# Patient Record
Sex: FEMALE | Race: WHITE
[De-identification: names, ages, dates, MRNs, and addresses within clinical notes are randomized per-mention and may not be internally consistent; named-entity substitution may affect disease eponyms.]

---

## 2017-05-10 ENCOUNTER — HOSPITAL ENCOUNTER (OUTPATIENT)
Dept: HOSPITAL 63 - US | Age: 82
Discharge: HOME | End: 2017-05-10
Attending: FAMILY MEDICINE
Payer: MEDICARE

## 2017-05-10 DIAGNOSIS — L03.129: ICD-10-CM

## 2017-05-10 DIAGNOSIS — I73.9: Primary | ICD-10-CM

## 2017-05-10 PROCEDURE — 93923 UPR/LXTR ART STDY 3+ LVLS: CPT

## 2017-05-10 NOTE — RAD
Indication: Diabetes with left leg wound.



Grayscale, color-flow and duplex Doppler evaluation of the left lower

extremity arterial system was performed.



Mild calcified atherosclerotic plaque is identified throughout the left lower

extremity arterial system. There are primarily biphasic waveforms throughout.

Velocities are unremarkable. No significant velocity elevation or stenosis is

seen. No occlusion is identified.



Impression: Atherosclerotic changes. No high-grade stenosis or occlusion is

identified.

## 2021-07-14 ENCOUNTER — HOSPITAL ENCOUNTER (EMERGENCY)
Dept: HOSPITAL 63 - ER | Age: 86
LOS: 1 days | Discharge: HOME | End: 2021-07-15
Payer: MEDICARE

## 2021-07-14 VITALS — WEIGHT: 144.4 LBS | HEIGHT: 63.5 IN | BODY MASS INDEX: 25.27 KG/M2

## 2021-07-14 DIAGNOSIS — M47.812: ICD-10-CM

## 2021-07-14 DIAGNOSIS — D72.829: ICD-10-CM

## 2021-07-14 DIAGNOSIS — E11.9: ICD-10-CM

## 2021-07-14 DIAGNOSIS — M25.511: Primary | ICD-10-CM

## 2021-07-14 DIAGNOSIS — Z88.0: ICD-10-CM

## 2021-07-14 PROCEDURE — 70450 CT HEAD/BRAIN W/O DYE: CPT

## 2021-07-14 PROCEDURE — 83735 ASSAY OF MAGNESIUM: CPT

## 2021-07-14 PROCEDURE — 73030 X-RAY EXAM OF SHOULDER: CPT

## 2021-07-14 PROCEDURE — 83880 ASSAY OF NATRIURETIC PEPTIDE: CPT

## 2021-07-14 PROCEDURE — 36415 COLL VENOUS BLD VENIPUNCTURE: CPT

## 2021-07-14 PROCEDURE — 71045 X-RAY EXAM CHEST 1 VIEW: CPT

## 2021-07-14 PROCEDURE — 93005 ELECTROCARDIOGRAM TRACING: CPT

## 2021-07-14 PROCEDURE — 82550 ASSAY OF CK (CPK): CPT

## 2021-07-14 PROCEDURE — 71250 CT THORAX DX C-: CPT

## 2021-07-14 PROCEDURE — 81001 URINALYSIS AUTO W/SCOPE: CPT

## 2021-07-14 PROCEDURE — 85025 COMPLETE CBC W/AUTO DIFF WBC: CPT

## 2021-07-14 PROCEDURE — 85007 BL SMEAR W/DIFF WBC COUNT: CPT

## 2021-07-14 PROCEDURE — 80076 HEPATIC FUNCTION PANEL: CPT

## 2021-07-14 PROCEDURE — 99285 EMERGENCY DEPT VISIT HI MDM: CPT

## 2021-07-14 PROCEDURE — 84484 ASSAY OF TROPONIN QUANT: CPT

## 2021-07-14 PROCEDURE — 72125 CT NECK SPINE W/O DYE: CPT

## 2021-07-14 PROCEDURE — 96360 HYDRATION IV INFUSION INIT: CPT

## 2021-07-14 PROCEDURE — 80048 BASIC METABOLIC PNL TOTAL CA: CPT

## 2021-07-14 NOTE — EKG
[facility] [address], [place] [address]

Test Date:    2021               Test Time:    23:03:23

Pat Name:     CRUZ VIVAS          Department:   

Patient ID:   [id number]           Room:          

Gender:       F                        Technician:   YUNI

:          1935               Requested By: MISTY ALLEN

Order Number: [id number]            Reading MD:     

                                 Measurements

Intervals                              Axis          

Rate:         97                       P:            -63

WI:           132                      QRS:          -46

QRSD:         78                       T:            13

QT:           348                                    

QTc:          446                                    

                           Interpretive Statements

SINUS RHYTHM

ATRIAL PREMATURE COMPLEX(ES)

ABNORMAL LEFT AXIS DEVIATION

QRS(T) CONTOUR ABNORMALITY

CONSISTENT WITH ANTEROSEPTAL INFARCT

PROBABLY OLD

CONSISTENT WITH INFERIOR INFARCT

PROBABLY OLD

ABNORMAL ECG

RI6.02

No previous ECG available for comparison

## 2021-07-14 NOTE — PHYS DOC
Past History


Past Medical History:  Arthritis





General Adult


HPI:


HPI:


".. This shoulder.. has just started hurting... ..it really been hurting 

tonight...  it hurts to move it.. and can't find a comfortable position... "





Patient is a 85 year old female who presents with right shoulder pain.  Patient 

is having pain with range of motion.  There is some trapezius spasms associated 

with pain.  Patient rates pain as 8 out of 10 and constant.  Patient denies any 

trauma.  No recent travel.  No specific ill contacts.  No history 

immunosuppression.   Patient is right hand dominant.  Pulses in right wrist are 

equal to left wrist.  Any movement of the shoulder joint causes increased pain. 

Patient denies any fever or chills.  No history of immune  suppression.does have

a history of asthma and bronchitis.  Patient never smoked.  Patient has 

completed Covid Moderna Vaccination x 2 times in March.  Patient normally 

follows with Dr. Nath.





Review of Systems:


Review of Systems:


Constitutional:  Denies fever or chills 


Eyes:  Denies change in visual acuity 


HENT:  Denies nasal congestion or sore throat 


Respiratory: History of wheezing


Cardiovascular:  Denies chest pain or edema 


GI:  Denies abdominal pain, nausea, vomiting, bloody stools or diarrhea 


: Denies dysuria 


Musculoskeletal: Complains of right shoulder pain and neck pain


Integument:  Denies rash 


Neurologic:  Denies headache, focal weakness or sensory changes 


Endocrine:  Denies polyuria or polydipsia 


Lymphatic:  Denies swollen glands 


Psychiatric:  Denies depression or anxiety





Family History:


Family History:


Noncontributory to presentation





Current Medications:


Current Meds:


See nursing for home meds





Allergies:


Allergies:


Allergic to penicillin ibuprofen





Physical Exam:


PE:





Constitutional: Moderate acute distress, non-toxic appearance. []


HENT: Normocephalic, atraumatic, bilateral external ears normal, oropharynx 

moist, no oral exudates, nose normal. []


Eyes: PERRLA, EOMI, conjunctiva normal, no discharge. [] 


Neck: Normal range of motion, right trapezius tenderness, supple, no stridor. []

 Kyphosis and scoliosis


Cardiovascular: Tachycardia heart rate regular rhythm, no murmur [] PMI to the l

eft


Lungs & Thorax:  Bilateral breath sounds equal apex with basilar crackles on 

auscultation []


Abdomen: Bowel sounds normal, soft, no tenderness, no masses, no pulsatile 

masses.  Old scar


Skin: Warm, dry, no erythema, no rash.  Poor turgor


Back: No tenderness, no CVA tenderness. [] 


Extremities: No tenderness, no cyanosis, no clubbing, ROM intact, no edema.  

Except findings in right shoulder as per HPI


Neurologic: Alert and oriented X 3, moves extremities on request, does have 

distal sensory,, no focal deficits noted. []


Psychologic: Affect anxious, judgement normal, mood normal. []





EKG:


EKG:


My interpretation of EKG shows a sinus rhythm at 97 bpm.  Does have occasional 

PACs and PVCs.  Left axis deviation and contour abnormality in anterior septal 

region.  No findings of acute STEMI with contralateral changes.  But this is a 

abnormal EKG.  []





Radiology/Procedures:


Radiology/Procedures:


[][facility] [address], [place] [address]


                                 [phone]


                                        


                                 IMAGING REPORT





                                     Signed





PATIENT: CRUZ VIVAS AACCOUNT: [id number]     MRN#: [id number]


: 1935           LOCATION: ER              AGE: 85


SEX: F                    EXAM DT: 21         ACCESSION#: [id number]


STATUS: PRE ER            ORD. PHYSICIAN: MISTY ALLEN MD


REASON: pain


PROCEDURE: CT HEAD AND CERVICAL SPINE WO





INDICATION: Head and neck pain





COMPARISON: None.





TECHNIQUE:





Axial CT images obtained through the head and cervical spine





.





One or more of the following individualized dose reduction techniques were 

utilized for this examination:  1. Automated exposure control;  2. Adjustment of

 the mA and/or kV according to patient size;  3. Use of iterative reconstruction

 technique.





FINDINGS:





Head:





Calcific atherosclerosis. Distal left internal carotid artery is mildly 

prominent in size measuring up to about 6-7 mm.


No midline shift.


Multifocal regions of low density throughout the white matter.


Prominence of ventricles and sulci which can be seen with age-related volume 

loss.


No acute intracranial hemorrhage.


There is some fluid in the maxillary sinuses.








Cervical spine:





Degenerative changes throughout the cervical spine with disc protrusions and 

osteophyte formation as well as uncovertebral and facet hypertrophy with 

multilevel central canal and neural foraminal stenosis.


Osseous demineralization.


Pannus formation at the dens.


Calcific atherosclerosis.


Thyroid nodules.





IMPRESSION:





*  No acute intracranial hemorrhage.





*  Low-density throughout the white matter. Nonspecific but can be from small 

vessel ischemic disease.





*  Degenerative changes of the cervical spine with multilevel central canal and 

neural foraminal stenosis. No definite fracture seen.





Electronically signed by: Brandon Garcia MD (7/15/2021 12:42 AM) DESKTOP-

[id number]














DICTATED AND SIGNED BY:     BRANDON GARCIA MD


DATE:     07/15/21 0034





CC: MIGUEL NATH MD; MISTY ALLEN MD ~MTH0 0





Heart Score:


C/O Chest Pain:  N/A


Risk Factors:


Risk Factors:  DM, Current or recent (<one month) smoker, HTN, HLP, family 

history of CAD, obesity.


Risk Scores:


Score 0 - 3:  2.5% MACE over next 6 weeks - Discharge Home


Score 4 - 6:  20.3% MACE over next 6 weeks - Admit for Clinical Observation


Score 7 - 10:  72.7% MACE over next 6 weeks - Early Invasive Strategies





Course & Med Decision Making:


Course & Med Decision Making


Pertinent Labs and Imaging studies reviewed. (See chart for details)





Patient follow-up with Dr. Nath.  Patient to take Zithromax 250 a day.  

Trial course of lidocaine patches over right shoulder.  Take Tylenol for pain.  

For marked pain may take Percocet up to 4 times a day.  Return if any concerns.








Impression:





1. Rt Shoulder Pain


2. DJD and Cervical Neuropathy


3. Leukocytosis  15.9


4. Thrombcytopenia 123


5.  Elevated Bun/Creat   33/1.3


6.  DM - Glucosed 347


7.  Rt. Basilar Opacity- and Linear opacities- Pneumonia?











[]





Dragon Disclaimer:


Dragon Disclaimer:


This electronic medical record was generated, in whole or in part, using a voice

 recognition dictation system.





Departure


Departure:


Referrals:  


MIGUEL NATH MD (PCP)


Scripts


Azithromycin (ZITHROMAX) 250 Mg Tablet


250 MG PO DAILY for ANTI-BIOTIC for 5 Days, #5 TAB 0 Refills


   Prov: MISTY ALLEN MD         7/15/21 


Oxycodone Hcl/Acetaminophen (PERCOCET 5-325 MG TABLET  **) 1 Each Tablet


1 TAB PO PRN QID PRN for PAIN MDD 4 Tablet(s) for 30 Days, #30 TAB 0 Refills


   Prov: MISTY ALLEN MD         7/15/21 


Lidocaine (Lidocaine PATCH **) 1 Each Adh..patch


1 EACH TP DAILY for FOR LOCAL PAIN, #10 PATCH


   REMOVE AFTER 12 HOURS


   Prov: MISTY ALLEN MD         7/15/21











MISTY ALLEN MD           2021 23:02

## 2021-07-15 VITALS — SYSTOLIC BLOOD PRESSURE: 138 MMHG | DIASTOLIC BLOOD PRESSURE: 62 MMHG

## 2021-07-15 LAB
% LYMPHS: 14 % (ref 24–48)
% MONOS: 8 % (ref 0–10)
% SEGS: 78 % (ref 35–66)
ALBUMIN SERPL-MCNC: 3 G/DL (ref 3.4–5)
ALP SERPL-CCNC: 82 U/L (ref 46–116)
ALT SERPL-CCNC: 16 U/L (ref 14–59)
ANION GAP SERPL CALC-SCNC: 9 MMOL/L (ref 6–14)
APTT PPP: YELLOW S
AST SERPL-CCNC: 10 U/L (ref 15–37)
BACTERIA #/AREA URNS HPF: 0 /HPF
BASOPHILS # BLD AUTO: 0.1 X10^3/UL (ref 0–0.2)
BASOPHILS NFR BLD: 1 % (ref 0–3)
BILIRUB DIRECT SERPL-MCNC: 0.2 MG/DL (ref 0–0.2)
BILIRUB SERPL-MCNC: 0.7 MG/DL (ref 0.2–1)
BILIRUB UR QL STRIP: (no result)
CA-I SERPL ISE-MCNC: 33 MG/DL (ref 7–20)
CALCIUM SERPL-MCNC: 8.5 MG/DL (ref 8.5–10.1)
CHLORIDE SERPL-SCNC: 97 MMOL/L (ref 98–107)
CO2 SERPL-SCNC: 26 MMOL/L (ref 21–32)
CREAT SERPL-MCNC: 1.7 MG/DL (ref 0.6–1)
EOSINOPHIL NFR BLD: 0 % (ref 0–3)
EOSINOPHIL NFR BLD: 0 X10^3/UL (ref 0–0.7)
ERYTHROCYTE [DISTWIDTH] IN BLOOD BY AUTOMATED COUNT: 14.1 % (ref 11.5–14.5)
FIBRINOGEN PPP-MCNC: CLEAR MG/DL
GFR SERPLBLD BASED ON 1.73 SQ M-ARVRAT: 28.6 ML/MIN
GLUCOSE SERPL-MCNC: 347 MG/DL (ref 70–99)
GLUCOSE UR STRIP-MCNC: 500 MG/DL
HCT VFR BLD CALC: 39.6 % (ref 36–47)
HGB BLD-MCNC: 13.1 G/DL (ref 12–15.5)
LYMPHOCYTES # BLD: 1.3 X10^3/UL (ref 1–4.8)
LYMPHOCYTES NFR BLD AUTO: 8 % (ref 24–48)
MAGNESIUM SERPL-MCNC: 2.2 MG/DL (ref 1.8–2.4)
MCH RBC QN AUTO: 29 PG (ref 25–35)
MCHC RBC AUTO-ENTMCNC: 33 G/DL (ref 31–37)
MCV RBC AUTO: 87 FL (ref 79–100)
MONO #: 1.9 X10^3/UL (ref 0–1.1)
MONOCYTES NFR BLD: 12 % (ref 0–9)
NEUT #: 12.7 X10^3UL (ref 1.8–7.7)
NEUTROPHILS NFR BLD AUTO: 79 % (ref 31–73)
NITRITE UR QL STRIP: (no result)
PLATELET # BLD AUTO: 123 X10^3/UL (ref 140–400)
PLATELET # BLD EST: (no result) 10*3/UL
POTASSIUM SERPL-SCNC: 3.9 MMOL/L (ref 3.5–5.1)
PROT SERPL-MCNC: 6.4 G/DL (ref 6.4–8.2)
RBC # BLD AUTO: 4.54 X10^6/UL (ref 3.5–5.4)
RBC #/AREA URNS HPF: 0 /HPF (ref 0–2)
SODIUM SERPL-SCNC: 132 MMOL/L (ref 136–145)
SP GR UR STRIP: 1.01
SQUAMOUS #/AREA URNS LPF: (no result) /LPF
UROBILINOGEN UR-MCNC: 0.2 MG/DL
WBC # BLD AUTO: 15.9 X10^3/UL (ref 4–11)
WBC #/AREA URNS HPF: 0 /HPF (ref 0–4)

## 2021-07-15 NOTE — RAD
INDICATION: Head and neck pain



COMPARISON: None.



TECHNIQUE:



Axial CT images obtained through the head and cervical spine



.



One or more of the following individualized dose reduction techniques were utilized for this examinat
ion:  1. Automated exposure control;  2. Adjustment of the mA and/or kV according to patient size;  3
. Use of iterative reconstruction technique.



FINDINGS:



Head:



Calcific atherosclerosis. Distal left internal carotid artery is mildly prominent in size measuring u
p to about 6-7 mm.

No midline shift.

Multifocal regions of low density throughout the white matter.

Prominence of ventricles and sulci which can be seen with age-related volume loss.

No acute intracranial hemorrhage.

There is some fluid in the maxillary sinuses.





Cervical spine:



Degenerative changes throughout the cervical spine with disc protrusions and osteophyte formation as 
well as uncovertebral and facet hypertrophy with multilevel central canal and neural foraminal stenos
is.

Osseous demineralization.

Pannus formation at the dens.

Calcific atherosclerosis.

Thyroid nodules.



IMPRESSION:



*  No acute intracranial hemorrhage.



*  Low-density throughout the white matter. Nonspecific but can be from small vessel ischemic disease
.



*  Degenerative changes of the cervical spine with multilevel central canal and neural foraminal sten
osis. No definite fracture seen.



Electronically signed by: Dm Garcia MD (7/15/2021 12:42 AM) [id number]

## 2021-07-15 NOTE — RAD
INDICATION: Reason: pain of shoulder/ Spl. Instructions:  / History: 



COMPARISON: None.



IMPRESSION: 



Right shoulder: 3 views obtained. Severe degenerative changes of the right shoulder. No definite acut
e fracture or dislocation



Electronically signed by: Dm Garcia MD (7/15/2021 4:50 AM) [id number]

## 2021-07-15 NOTE — RAD
INDICATION: Reason: Rt.chest wall pain / Spl. Instructions:  / History: .  



COMPARISON: None.



TECHNIQUE:



Axial CT images obtained through the chest without contrast.



One or more of the following individualized dose reduction techniques were utilized for this examinat
ion:  1. Automated exposure control;  2. Adjustment of the mA and/or kV according to patient size;  3
. Use of iterative reconstruction technique.



FINDINGS:



Linear opacities at the lower lungs which could be from scarring or atelectasis.

No evidence of pneumothorax.

Small focal opacity at the right chest base.

Severe calcific atherosclerosis.

Coronary artery calcific atherosclerosis.

Thyroid nodules.

Severe degenerative changes of the right shoulder.

Degenerative changes the spine with scoliotic curvature and multilevel central canal and neural michael
inal stenosis.

Osseous demineralization.

No displaced rib fracture is seen.





IMPRESSION:



*  Mild dependent atelectasis.



*  Severe calcific atherosclerosis.



Electronically signed by: Dm Garcia MD (7/15/2021 3:18 AM) [id number]

## 2021-07-15 NOTE — RAD
INDICATION: Reason: pain of chest/ Spl. Instructions:  / History: 



COMPARISON: None.



FINDINGS:



Single view of chest obtained.

Cardiac silhouette is prominent in size with calcific atherosclerosis.

Mild linear opacities lung bases

Degenerative changes right greater than left shoulder.





IMPRESSION:



*  Linear opacities at the lung bases can be seen with atelectasis.



Electronically signed by: Dm Garcia MD (7/15/2021 4:35 AM) DESKTOP-[id number]

## 2021-07-20 ENCOUNTER — HOSPITAL ENCOUNTER (INPATIENT)
Dept: HOSPITAL 63 - 1 SOUTH | Age: 86
LOS: 2 days | Discharge: HOME | DRG: 602 | End: 2021-07-22
Attending: INTERNAL MEDICINE | Admitting: INTERNAL MEDICINE
Payer: MEDICARE

## 2021-07-20 VITALS — DIASTOLIC BLOOD PRESSURE: 69 MMHG | SYSTOLIC BLOOD PRESSURE: 126 MMHG

## 2021-07-20 VITALS — DIASTOLIC BLOOD PRESSURE: 66 MMHG | SYSTOLIC BLOOD PRESSURE: 117 MMHG

## 2021-07-20 VITALS — DIASTOLIC BLOOD PRESSURE: 68 MMHG | SYSTOLIC BLOOD PRESSURE: 134 MMHG

## 2021-07-20 VITALS — HEIGHT: 62 IN | BODY MASS INDEX: 26.65 KG/M2 | WEIGHT: 144.84 LBS

## 2021-07-20 DIAGNOSIS — Z88.8: ICD-10-CM

## 2021-07-20 DIAGNOSIS — Z79.01: ICD-10-CM

## 2021-07-20 DIAGNOSIS — M10.9: ICD-10-CM

## 2021-07-20 DIAGNOSIS — I12.9: ICD-10-CM

## 2021-07-20 DIAGNOSIS — E43: ICD-10-CM

## 2021-07-20 DIAGNOSIS — L03.115: Primary | ICD-10-CM

## 2021-07-20 DIAGNOSIS — Z88.0: ICD-10-CM

## 2021-07-20 DIAGNOSIS — I82.459: ICD-10-CM

## 2021-07-20 DIAGNOSIS — Z98.42: ICD-10-CM

## 2021-07-20 DIAGNOSIS — Z90.710: ICD-10-CM

## 2021-07-20 DIAGNOSIS — E11.51: ICD-10-CM

## 2021-07-20 DIAGNOSIS — I82.431: ICD-10-CM

## 2021-07-20 DIAGNOSIS — N18.4: ICD-10-CM

## 2021-07-20 DIAGNOSIS — E11.22: ICD-10-CM

## 2021-07-20 DIAGNOSIS — Z86.718: ICD-10-CM

## 2021-07-20 DIAGNOSIS — B95.62: ICD-10-CM

## 2021-07-20 DIAGNOSIS — J44.9: ICD-10-CM

## 2021-07-20 DIAGNOSIS — E78.5: ICD-10-CM

## 2021-07-20 DIAGNOSIS — Z98.41: ICD-10-CM

## 2021-07-20 DIAGNOSIS — H90.5: ICD-10-CM

## 2021-07-20 DIAGNOSIS — F03.90: ICD-10-CM

## 2021-07-20 LAB
ALBUMIN SERPL-MCNC: 2.4 G/DL (ref 3.4–5)
ALBUMIN/GLOB SERPL: 0.6 {RATIO} (ref 1–1.7)
ALP SERPL-CCNC: 78 U/L (ref 46–116)
ALT SERPL-CCNC: 14 U/L (ref 14–59)
ANION GAP SERPL CALC-SCNC: 7 MMOL/L (ref 6–14)
AST SERPL-CCNC: 14 U/L (ref 15–37)
BILIRUB SERPL-MCNC: 0.4 MG/DL (ref 0.2–1)
BUN/CREAT SERPL: 13 (ref 6–20)
CA-I SERPL ISE-MCNC: 22 MG/DL (ref 7–20)
CALCIUM SERPL-MCNC: 8.4 MG/DL (ref 8.5–10.1)
CHLORIDE SERPL-SCNC: 98 MMOL/L (ref 98–107)
CO2 SERPL-SCNC: 29 MMOL/L (ref 21–32)
CREAT SERPL-MCNC: 1.7 MG/DL (ref 0.6–1)
CRP SERPL-MCNC: 109.3 MG/L (ref 0–3.3)
ERYTHROCYTE [DISTWIDTH] IN BLOOD BY AUTOMATED COUNT: 14.2 % (ref 11.5–14.5)
ERYTHROCYTE [SEDIMENTATION RATE] IN BLOOD: 72 MM/H (ref 0–25)
GFR SERPLBLD BASED ON 1.73 SQ M-ARVRAT: 28.6 ML/MIN
GLOBULIN SER-MCNC: 4.1 G/DL (ref 2.2–3.8)
GLUCOSE SERPL-MCNC: 289 MG/DL (ref 70–99)
HCT VFR BLD CALC: 35.3 % (ref 36–47)
HGB BLD-MCNC: 11.9 G/DL (ref 12–15.5)
MCH RBC QN AUTO: 29 PG (ref 25–35)
MCHC RBC AUTO-ENTMCNC: 34 G/DL (ref 31–37)
MCV RBC AUTO: 87 FL (ref 79–100)
PLATELET # BLD AUTO: 179 X10^3/UL (ref 140–400)
POTASSIUM SERPL-SCNC: 3.5 MMOL/L (ref 3.5–5.1)
PROT SERPL-MCNC: 6.5 G/DL (ref 6.4–8.2)
RBC # BLD AUTO: 4.05 X10^6/UL (ref 3.5–5.4)
SODIUM SERPL-SCNC: 134 MMOL/L (ref 136–145)
URATE SERPL-MCNC: 5.5 MG/DL (ref 2.6–6)
WBC # BLD AUTO: 8 X10^3/UL (ref 4–11)

## 2021-07-20 PROCEDURE — 86140 C-REACTIVE PROTEIN: CPT

## 2021-07-20 PROCEDURE — 82947 ASSAY GLUCOSE BLOOD QUANT: CPT

## 2021-07-20 PROCEDURE — 73630 X-RAY EXAM OF FOOT: CPT

## 2021-07-20 PROCEDURE — 85027 COMPLETE CBC AUTOMATED: CPT

## 2021-07-20 PROCEDURE — 80053 COMPREHEN METABOLIC PANEL: CPT

## 2021-07-20 PROCEDURE — 85651 RBC SED RATE NONAUTOMATED: CPT

## 2021-07-20 PROCEDURE — 36415 COLL VENOUS BLD VENIPUNCTURE: CPT

## 2021-07-20 PROCEDURE — 84550 ASSAY OF BLOOD/URIC ACID: CPT

## 2021-07-20 PROCEDURE — 93971 EXTREMITY STUDY: CPT

## 2021-07-20 RX ADMIN — ATORVASTATIN CALCIUM SCH MG: 10 TABLET, FILM COATED ORAL at 21:06

## 2021-07-20 RX ADMIN — DORZOLAMIDE HYDROCHLORIDE SCH DROP: 20 SOLUTION/ DROPS OPHTHALMIC at 21:06

## 2021-07-20 RX ADMIN — LATANOPROST SCH DROP: 50 SOLUTION OPHTHALMIC at 21:06

## 2021-07-20 RX ADMIN — LOSARTAN POTASSIUM SCH MG: 25 TABLET, FILM COATED ORAL at 19:30

## 2021-07-20 NOTE — HP
ADMIT DATE: 2021

HISTORY OF PRESENT ILLNESS:  The patient is an 85-year-old  female 

patient admitted directly from Dr. Nath's office with a complaint of 

swelling and pain of the right foot that started yesterday.  The patient stated 

she has mild pain, but denied any chills, rigors or fever.  Denied any trauma or

fall.  She was admitted.  She has also had history of DVT before and also 

history of gout, was admitted for further evaluation and treatment.



PAST MEDICAL HISTORY:  Significant for type 2 diabetes mellitus, hypertension, 

hyperlipidemia, bronchial asthma/COPD, chronic kidney disease, history of gout, 

glaucoma and sensorineural deafness.  She has also history of DVT, treated 

before with 4 years of continuous Eliquis.



PAST SURGICAL HISTORY:  Significant for total abdominal hysterectomy, bilateral 

cataract extraction and colonoscopy.



ALLERGIES:  SHE IS ALLERGIC TO PENICILLIN AND IBUPROFEN.



MEDICATIONS:  She is currently on the following medications:  Albuterol sulfate 

2 puffs every 4-6 hours, albuterol sulfate by nebulizer every 6 hours, 

pravastatin 40 mg at bedtime, diltiazem 180 mg daily, losartan potassium 25 mg 

once a day, triamterene/hydrochlorothiazide 37.5/25 one tablet once a day, 

dorzolamide/timolol one drop to the right eye once a day, latanoprost one drop 

to both eyes.  She is also on Lantus insulin 35 units at day time.



FAMILY HISTORY:  She has two brothers who are .  Both parents are 

.



SOCIAL HISTORY:  She is .  She has two sons from previous marriage and 

one daughter.  She never smoked, does not drink alcohol or do recreational 

drugs.  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



REVIEW OF SYSTEMS:  As per history of present illness.



PHYSICAL EXAMINATION:

GENERAL:  When I examined her, she looked well and was clearly in no apparent 

respiratory distress.  No pallor, jaundice, cyanosis.  No lymphadenopathy or 

thyromegaly.  No jugular venous distention.  She does have right lower extremity

more swollen all the way to the right knee.  There is marked swelling and 

redness on the dorsum of the right big toe and the dorsum of the right foot.

VITAL SIGNS:  Her heart rate was 91, blood pressure is 134/68, temperature was 

98.7, respiratory rate was 16 and oxygen saturation was 93% on room air.

HEAD, EYES, EARS, NOSE AND THROAT:  Normocephalic, atraumatic.

NECK:  Supple.

HEART:  Showed normal first and second heart sounds.  No gallop or murmur.

CHEST:  Clear to auscultation.  No crepitation or rhonchi.

ABDOMEN:  Distended, soft, nontender.

NEUROLOGIC:  She was hard of hearing, has also some cognitive impairment and 

memory impairment; however, all her cranial nerves are intact.  She moves 

extremities without difficulty.  She apparently ambulates without assistance or 

assistive devices.



ASSESSMENT AND PLAN:  I did order lab work including a CBC, a CMP, sed rate, 

CRP, and uric acid, ordered a x-ray of the right foot and venous Doppler 

ultrasound of the right lower extremity.  I did start her empirically on Lovenox

1 mg/kg once a day as her kidney has marked kidney impairment and reconciled all

her medications, started vancomycin as per pharmacy recommendation.  If her uric

acid and inflammatory markers are elevated, probably she might need to be on 

steroids as she has impaired kidney function and anti-inflammatory medication 

and colchicine are contraindicated.  Otherwise, she will be treated and 

obviously, I did order venous Doppler ultrasound tomorrow together with x-ray of

the right foot and if she is positive for deep venous thrombosis , she will 

probably be on Eliquis.







AMM/KDA

DR: AMM/nts   DD: 2021 17:49

DT: 2021 19:19   TID: [id number]

## 2021-07-20 NOTE — RAD
Right lower extremity venous ultrasound, :



History: Right leg tightness, redness, swelling



Duplex evaluation including grayscale, color flow and spectral Doppler analysis was performed.  The f
emoral veins show no filling defects to suggest DVT.



There is incomplete compression of the right popliteal vein. There are intraluminal echoes in the pop
liteal vein consistent with venous thrombosis. There is decreased flow in the popliteal vein with col
or imaging. Clot propagates into a peroneal vein. There is edema in the superficial soft tissues of t
he right calf.





IMPRESSION:

1. Deep venous thrombosis involving the popliteal vein and a peroneal vein in the calf.

2. Lower leg edema in the superficial soft tissues.



Electronically signed by: Taj Garcia MD (7/20/2021 10:39 PM) [facility]

## 2021-07-21 VITALS — DIASTOLIC BLOOD PRESSURE: 65 MMHG | SYSTOLIC BLOOD PRESSURE: 101 MMHG

## 2021-07-21 VITALS — SYSTOLIC BLOOD PRESSURE: 121 MMHG | DIASTOLIC BLOOD PRESSURE: 53 MMHG

## 2021-07-21 VITALS — DIASTOLIC BLOOD PRESSURE: 65 MMHG | SYSTOLIC BLOOD PRESSURE: 122 MMHG

## 2021-07-21 VITALS — SYSTOLIC BLOOD PRESSURE: 132 MMHG | DIASTOLIC BLOOD PRESSURE: 73 MMHG

## 2021-07-21 RX ADMIN — DORZOLAMIDE HYDROCHLORIDE SCH DROP: 20 SOLUTION/ DROPS OPHTHALMIC at 09:04

## 2021-07-21 RX ADMIN — INSULIN LISPRO SCH UNITS: 100 INJECTION, SOLUTION INTRAVENOUS; SUBCUTANEOUS at 08:00

## 2021-07-21 RX ADMIN — INSULIN LISPRO SCH UNITS: 100 INJECTION, SOLUTION INTRAVENOUS; SUBCUTANEOUS at 16:51

## 2021-07-21 RX ADMIN — INSULIN GLARGINE SCH UNIT: 100 INJECTION, SOLUTION SUBCUTANEOUS at 09:09

## 2021-07-21 RX ADMIN — ATORVASTATIN CALCIUM SCH MG: 10 TABLET, FILM COATED ORAL at 20:22

## 2021-07-21 RX ADMIN — INSULIN LISPRO SCH UNITS: 100 INJECTION, SOLUTION INTRAVENOUS; SUBCUTANEOUS at 11:45

## 2021-07-21 RX ADMIN — LATANOPROST SCH DROP: 50 SOLUTION OPHTHALMIC at 09:03

## 2021-07-21 RX ADMIN — APIXABAN SCH MG: 5 TABLET, FILM COATED ORAL at 20:22

## 2021-07-21 RX ADMIN — APIXABAN SCH MG: 5 TABLET, FILM COATED ORAL at 09:03

## 2021-07-21 RX ADMIN — LOSARTAN POTASSIUM SCH MG: 25 TABLET, FILM COATED ORAL at 09:03

## 2021-07-21 RX ADMIN — DORZOLAMIDE HYDROCHLORIDE SCH DROP: 20 SOLUTION/ DROPS OPHTHALMIC at 20:23

## 2021-07-21 NOTE — RAD
EXAM:  XR FOOT_RIGHT 3 VIEWS 7/21/2021 6:15 AM



CLINICAL INDICATION:  Pain and swelling of right foot



COMPARISON:  None



TECHNIQUE:  3 views of the right foot



FINDINGS:  The bones are diffusely demineralized, limiting the exam. There is no displaced fracture o
r osseous destruction. Hallux valgus is noted. Mild degenerative joint disease of the first CMC joint
, TMT joints, and naviculocuneiform joint. There is mild diffuse soft tissue swelling..



IMPRESSION:  

1. No acute osseous abnormality.

2. Osteopenia. Mild scattered degenerative joint disease.

3. Mild diffuse soft tissue swelling.



Electronically signed by: Caitlyn Vo MD (7/21/2021 1:42 PM) [id number]

## 2021-07-21 NOTE — PN
DATE: 07/21/2021

ATTENDING PHYSICIANS:  Dr. Vega and Dr. Dang.



SUBJECTIVE:  The patient is pleasant.  She wants to go home.  She remains 

pleasantly confused.  Her  and caretakers in the room asking most 

appropriate questions.



OBJECTIVE FINDINGS:

VITAL SIGNS:  She is afebrile.  Blood pressure this morning is 132/73, pulse is 

66 and regular, oxygen saturation 94% on room air.  Again, she is afebrile.

HEENT:  Head is without trauma.  Pupils are reactive.  Sclerae nonicteric.  

Oropharynx clear.

NECK:  Supple, no bruits.

LUNGS:  Clear.

CARDIOVASCULAR:  Showed regular heart tones.

ABDOMEN:  Soft.  No guarding or rebound tenderness.

EXTREMITIES:  Shows swelling and erythema of the right lower extremity that 

extends up to her thighs.  There is localized stasis dermatitis, redness and 

erythema, more so in the dorsum and the ankles of the right foot.



ASSESSMENT:

1.  An 85-year-old female with cellulitis of the right lower extremity.

2.  Deep vein thrombosis involving the popliteal and peroneal veins of the right

leg.

3.  Dementia.

4.  Chronic kidney disease, stage 4.

5.  Type 2 diabetes.

6.  Probable diabetic nephropathy.



PLAN:

1.  Continue vancomycin as ordered for most likely Staphylococcus infection.

2.  Eliquis modified for creatinine clearance.

3.  Continue home meds.

4.  Diet as tolerated.

5.  We will decide on the day to day basis when she is ready for discharge with 

oral antibiotics.  I explained to the  that the Eliquis should be taken 

for the next 6 months.







ALEKSANDAR/ALL

DR: Duane   DD: 07/21/2021 10:50

DT: 07/21/2021 22:41   TID: [id number]

CC:     MIGUEL LIPSCOMB MD

## 2021-07-22 VITALS
SYSTOLIC BLOOD PRESSURE: 135 MMHG | DIASTOLIC BLOOD PRESSURE: 79 MMHG | SYSTOLIC BLOOD PRESSURE: 135 MMHG | DIASTOLIC BLOOD PRESSURE: 79 MMHG

## 2021-07-22 VITALS — DIASTOLIC BLOOD PRESSURE: 74 MMHG | SYSTOLIC BLOOD PRESSURE: 128 MMHG

## 2021-07-22 RX ADMIN — INSULIN GLARGINE SCH UNIT: 100 INJECTION, SOLUTION SUBCUTANEOUS at 08:08

## 2021-07-22 RX ADMIN — INSULIN LISPRO SCH UNITS: 100 INJECTION, SOLUTION INTRAVENOUS; SUBCUTANEOUS at 08:34

## 2021-07-22 RX ADMIN — LOSARTAN POTASSIUM SCH MG: 25 TABLET, FILM COATED ORAL at 08:05

## 2021-07-22 RX ADMIN — APIXABAN SCH MG: 5 TABLET, FILM COATED ORAL at 08:06

## 2021-07-22 RX ADMIN — DORZOLAMIDE HYDROCHLORIDE SCH DROP: 20 SOLUTION/ DROPS OPHTHALMIC at 08:06

## 2021-07-22 NOTE — DS
DATE OF DISCHARGE: 07/22/2021

ATTENDING PHYSICIAN:  Dr. Vega.



FINAL DISCHARGE DIAGNOSES:

1.  Cellulitis of the right foot.

2.  New deep vein thrombosis involving the right popliteal and peroneal veins.

3.  Profound dementia.

4.  Chronic kidney disease, stage IV.

5.  Type 2 diabetes.

6.  Diabetic nephropathy.



HISTORY AND PHYSICAL:  This is an 85-year-old female from Dr. Lipscomb's office 

with cellulitis and swelling of the right foot.  She was also found to have a 

DVT involving the peroneal and popliteal veins.



PHYSICAL EXAMINATION:  Please see the dictated note.



PERTINENT LABORATORY AND X-RAY STUDIES:  Admission hemoglobin was 11.9 g/dL, 

white count 8000.  Blood sugars in the mid 100 range.



COURSE IN THE HOSPITAL:  She was treated with 3 days of intravenous vancomycin. 

We did start her on Eliquis 5 mg b.i.d. modified dose based on renal clearance. 

This was recommended by the pharmacist.  She did well.  Swelling came down.  On 

the third hospital day, she wanted to go home.  Most likelihood, this is an MRSA

infection.  I recommended 7 more days of Bactrim-DS 1 b.i.d.  She has no sulfa 

allergy.  In addition, doxycycline 100 mg p.o. b.i.d., scripts for Eliquis 5 mg 

b.i.d.  I recommend a minimum of 6 months at this time based on the blood clot. 

Other home meds are unchanged.  They include albuterol as needed, diltiazem, 

dorzolamide eye drops, insulin as scheduled, Xalatan eye drops, losartan, 

pravastatin prescription as prescribed.  She will follow up with Dr. Lipscomb in

2 weeks' time.  She was discharged then with explicit instructions given to her 

 in stable condition with explicit written and followup care.



TOTAL DISCHARGE TIME SPENT:  41 minutes.







BENJA

DR: Duane   DD: 07/22/2021 11:03

DT: 07/22/2021 13:09   TID: [id number]

CC:     MIGUEL LIPSCOMB MD

## 2021-07-28 ENCOUNTER — HOSPITAL ENCOUNTER (OUTPATIENT)
Dept: HOSPITAL 63 - RAD | Age: 86
End: 2021-07-28
Attending: FAMILY MEDICINE
Payer: MEDICARE

## 2021-07-28 DIAGNOSIS — M25.532: ICD-10-CM

## 2021-07-28 DIAGNOSIS — M85.88: ICD-10-CM

## 2021-07-28 DIAGNOSIS — M18.12: Primary | ICD-10-CM

## 2021-07-28 PROCEDURE — 73130 X-RAY EXAM OF HAND: CPT

## 2021-07-28 PROCEDURE — 73110 X-RAY EXAM OF WRIST: CPT

## 2021-07-28 NOTE — RAD
EXAM: 

1. LEFT HAND 3 VIEWS.

2. LEFT WRIST 3 VIEWS.



HISTORY: Left hand/wrist pain.



COMPARISON: None.



FINDINGS: Osteopenia is moderate to severe. No fractures are identified in the wrist. Alignment is ma
intained. Joint spaces are maintained. A watch projects over the forearm. Atherosclerotic calcificati
ons are noted.



No fractures are identified throughout the hand. Interphalangeal osteoarthritis is moderate to severe
 diffusely. It is severe at the second and third distal interphalangeal joints, and the fourth proxim
al interphalangeal joints, possibly with a superimposed erosive component. There is some medial devia
tion of the third distal phalanx. First carpometacarpal osteoarthritis is moderate to severe. Metacar
pophalangeal osteoarthritis is mild diffusely.



IMPRESSION: 

1. 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

2. First carpometacarpal osteoarthritis is moderate to severe.



Electronically signed by: DESMOND Pichardo MD (7/28/2021 3:07 PM) [facility]

## 2021-07-28 NOTE — RAD
EXAM: 

1. LEFT HAND 3 VIEWS.

2. LEFT WRIST 3 VIEWS.



HISTORY: Left hand/wrist pain.



COMPARISON: None.



FINDINGS: Osteopenia is moderate to severe. No fractures are identified in the wrist. Alignment is ma
intained. Joint spaces are maintained. A watch projects over the forearm. Atherosclerotic calcificati
ons are noted.



No fractures are identified throughout the hand. Interphalangeal osteoarthritis is moderate to severe
 diffusely. It is severe at the second and third distal interphalangeal joints, and the fourth proxim
al interphalangeal joints, possibly with a superimposed erosive component. There is some medial devia
tion of the third distal phalanx. First carpometacarpal osteoarthritis is moderate to severe. Metacar
pophalangeal osteoarthritis is mild diffusely.



IMPRESSION: 

1. 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

2. First carpometacarpal osteoarthritis is moderate to severe.



Electronically signed by: DESMOND Pichardo MD (7/28/2021 3:07 PM) [facility]

## 2021-08-08 ENCOUNTER — HOSPITAL ENCOUNTER (INPATIENT)
Dept: HOSPITAL 63 - ER | Age: 86
LOS: 2 days | Discharge: HOME | DRG: 392 | End: 2021-08-10
Attending: HOSPITALIST | Admitting: HOSPITALIST
Payer: MEDICARE

## 2021-08-08 VITALS — DIASTOLIC BLOOD PRESSURE: 55 MMHG | SYSTOLIC BLOOD PRESSURE: 110 MMHG

## 2021-08-08 VITALS — HEIGHT: 63 IN | WEIGHT: 139.77 LBS | BODY MASS INDEX: 24.77 KG/M2

## 2021-08-08 VITALS — DIASTOLIC BLOOD PRESSURE: 65 MMHG | SYSTOLIC BLOOD PRESSURE: 118 MMHG

## 2021-08-08 VITALS — SYSTOLIC BLOOD PRESSURE: 113 MMHG | DIASTOLIC BLOOD PRESSURE: 59 MMHG

## 2021-08-08 VITALS — SYSTOLIC BLOOD PRESSURE: 111 MMHG | DIASTOLIC BLOOD PRESSURE: 59 MMHG

## 2021-08-08 VITALS — SYSTOLIC BLOOD PRESSURE: 113 MMHG | DIASTOLIC BLOOD PRESSURE: 61 MMHG

## 2021-08-08 VITALS — DIASTOLIC BLOOD PRESSURE: 49 MMHG | SYSTOLIC BLOOD PRESSURE: 113 MMHG

## 2021-08-08 VITALS — DIASTOLIC BLOOD PRESSURE: 46 MMHG | SYSTOLIC BLOOD PRESSURE: 96 MMHG

## 2021-08-08 VITALS — DIASTOLIC BLOOD PRESSURE: 60 MMHG | SYSTOLIC BLOOD PRESSURE: 124 MMHG

## 2021-08-08 VITALS — DIASTOLIC BLOOD PRESSURE: 53 MMHG | SYSTOLIC BLOOD PRESSURE: 111 MMHG

## 2021-08-08 VITALS — DIASTOLIC BLOOD PRESSURE: 60 MMHG | SYSTOLIC BLOOD PRESSURE: 107 MMHG

## 2021-08-08 VITALS — DIASTOLIC BLOOD PRESSURE: 56 MMHG | SYSTOLIC BLOOD PRESSURE: 115 MMHG

## 2021-08-08 VITALS — DIASTOLIC BLOOD PRESSURE: 48 MMHG | SYSTOLIC BLOOD PRESSURE: 111 MMHG

## 2021-08-08 VITALS — DIASTOLIC BLOOD PRESSURE: 59 MMHG | SYSTOLIC BLOOD PRESSURE: 114 MMHG

## 2021-08-08 VITALS — SYSTOLIC BLOOD PRESSURE: 118 MMHG | DIASTOLIC BLOOD PRESSURE: 63 MMHG

## 2021-08-08 DIAGNOSIS — Z88.6: ICD-10-CM

## 2021-08-08 DIAGNOSIS — Z86.718: ICD-10-CM

## 2021-08-08 DIAGNOSIS — K21.9: Primary | ICD-10-CM

## 2021-08-08 DIAGNOSIS — E11.22: ICD-10-CM

## 2021-08-08 DIAGNOSIS — E11.40: ICD-10-CM

## 2021-08-08 DIAGNOSIS — Z90.710: ICD-10-CM

## 2021-08-08 DIAGNOSIS — Z66: ICD-10-CM

## 2021-08-08 DIAGNOSIS — I12.9: ICD-10-CM

## 2021-08-08 DIAGNOSIS — Z79.4: ICD-10-CM

## 2021-08-08 DIAGNOSIS — M19.90: ICD-10-CM

## 2021-08-08 DIAGNOSIS — J45.909: ICD-10-CM

## 2021-08-08 DIAGNOSIS — Z88.0: ICD-10-CM

## 2021-08-08 DIAGNOSIS — E87.6: ICD-10-CM

## 2021-08-08 DIAGNOSIS — F03.90: ICD-10-CM

## 2021-08-08 DIAGNOSIS — N18.4: ICD-10-CM

## 2021-08-08 DIAGNOSIS — I48.0: ICD-10-CM

## 2021-08-08 DIAGNOSIS — Z79.01: ICD-10-CM

## 2021-08-08 LAB
ALBUMIN SERPL-MCNC: 2.9 G/DL (ref 3.4–5)
ALBUMIN/GLOB SERPL: 0.7 {RATIO} (ref 1–1.7)
ALP SERPL-CCNC: 86 U/L (ref 46–116)
ALT SERPL-CCNC: 13 U/L (ref 14–59)
ANION GAP SERPL CALC-SCNC: 10 MMOL/L (ref 6–14)
AST SERPL-CCNC: 15 U/L (ref 15–37)
BASOPHILS # BLD AUTO: 0.1 X10^3/UL (ref 0–0.2)
BASOPHILS NFR BLD: 1 % (ref 0–3)
BILIRUB SERPL-MCNC: 0.3 MG/DL (ref 0.2–1)
BUN ISTAT: 18 MG/DL (ref 8–26)
BUN/CREAT SERPL: 14 (ref 6–20)
CA-I SERPL ISE-MCNC: 17 MG/DL (ref 7–20)
CALCIUM SERPL-MCNC: 9.2 MG/DL (ref 8.5–10.1)
CHLORIDE SERPL-SCNC: 100 MMOL/L (ref 98–107)
CO2 SERPL-SCNC: 26 MMOL/L (ref 21–32)
CREAT SERPL-MCNC: 1.2 MG/DL (ref 0.6–1)
EOSINOPHIL NFR BLD: 0.2 X10^3/UL (ref 0–0.7)
EOSINOPHIL NFR BLD: 3 % (ref 0–3)
ERYTHROCYTE [DISTWIDTH] IN BLOOD BY AUTOMATED COUNT: 14.7 % (ref 11.5–14.5)
GFR SERPLBLD BASED ON 1.73 SQ M-ARVRAT: 42.7 ML/MIN
GLOBULIN SER-MCNC: 4.3 G/DL (ref 2.2–3.8)
GLUCOSE BLD-MCNC: 193 MG/DL (ref 60–99)
GLUCOSE SERPL-MCNC: 193 MG/DL (ref 70–99)
HCT VFR BLD AUTO: 37 %
HCT VFR BLD CALC: 37.2 % (ref 36–47)
HEMOGLOBIN ISTAT: 12.6 GM/DL
HGB BLD-MCNC: 12.2 G/DL (ref 12–15.5)
LYMPHOCYTES # BLD: 2 X10^3/UL (ref 1–4.8)
LYMPHOCYTES NFR BLD AUTO: 26 % (ref 24–48)
MCH RBC QN AUTO: 29 PG (ref 25–35)
MCHC RBC AUTO-ENTMCNC: 33 G/DL (ref 31–37)
MCV RBC AUTO: 87 FL (ref 79–100)
MONO #: 0.9 X10^3/UL (ref 0–1.1)
MONOCYTES NFR BLD: 12 % (ref 0–9)
NEUT #: 4.4 X10^3UL (ref 1.8–7.7)
NEUTROPHILS NFR BLD AUTO: 58 % (ref 31–73)
PLATELET # BLD AUTO: 323 X10^3/UL (ref 140–400)
POTASSIUM BLD-SCNC: 3.4 MMOL/L (ref 3.5–5)
POTASSIUM SERPL-SCNC: 3.4 MMOL/L (ref 3.5–5.1)
PROT SERPL-MCNC: 7.2 G/DL (ref 6.4–8.2)
RBC # BLD AUTO: 4.26 X10^6/UL (ref 3.5–5.4)
SODIUM SERPL-SCNC: 136 MMOL/L (ref 135–145)
SODIUM SERPL-SCNC: 136 MMOL/L (ref 136–145)
WBC # BLD AUTO: 7.7 X10^3/UL (ref 4–11)

## 2021-08-08 PROCEDURE — 82947 ASSAY GLUCOSE BLOOD QUANT: CPT

## 2021-08-08 PROCEDURE — 80053 COMPREHEN METABOLIC PANEL: CPT

## 2021-08-08 PROCEDURE — 80047 BASIC METABLC PNL IONIZED CA: CPT

## 2021-08-08 PROCEDURE — 96374 THER/PROPH/DIAG INJ IV PUSH: CPT

## 2021-08-08 PROCEDURE — 36415 COLL VENOUS BLD VENIPUNCTURE: CPT

## 2021-08-08 PROCEDURE — 80061 LIPID PANEL: CPT

## 2021-08-08 PROCEDURE — 71045 X-RAY EXAM CHEST 1 VIEW: CPT

## 2021-08-08 PROCEDURE — 84484 ASSAY OF TROPONIN QUANT: CPT

## 2021-08-08 PROCEDURE — 85025 COMPLETE CBC W/AUTO DIFF WBC: CPT

## 2021-08-08 PROCEDURE — 84443 ASSAY THYROID STIM HORMONE: CPT

## 2021-08-08 PROCEDURE — 83735 ASSAY OF MAGNESIUM: CPT

## 2021-08-08 PROCEDURE — 93005 ELECTROCARDIOGRAM TRACING: CPT

## 2021-08-08 PROCEDURE — 83880 ASSAY OF NATRIURETIC PEPTIDE: CPT

## 2021-08-08 RX ADMIN — INSULIN LISPRO SCH UNITS: 100 INJECTION, SOLUTION INTRAVENOUS; SUBCUTANEOUS at 17:44

## 2021-08-08 RX ADMIN — PANTOPRAZOLE SODIUM PRN MLS/HR: 40 INJECTION, POWDER, FOR SOLUTION INTRAVENOUS at 09:13

## 2021-08-08 RX ADMIN — APIXABAN SCH MG: 5 TABLET, FILM COATED ORAL at 20:16

## 2021-08-08 RX ADMIN — PANTOPRAZOLE SODIUM PRN MLS/HR: 40 INJECTION, POWDER, FOR SOLUTION INTRAVENOUS at 18:35

## 2021-08-08 NOTE — PHYS DOC
Past History


Past Medical History:  Arthritis


Past Surgical History:  Tonsillectomy


Alcohol Use:  None





General Adult


EDM:


Chief Complaint:  CHEST PAIN





HPI:


HPI:


85 year old female with past medical history of dementia presents for evaluation

of chest pain.


Patient is not accompanied by family.


Patient does not know her past medical history.


Patient initially did not endorse chest pain.


Prompted by nursing-- patient recalled having chest pain but unsure of the 

onset.


She currently denied chest pain.


States pain was a pressure and associated with shortness of breath.


Heart rate on the cardiac monitor was 110-115 bpm.





Review of previous medical records show a past medical history of:


DVT, CKD stage IV, diabetes type 2, and diabetic neuropathy





Review of Systems:


Review of Systems:


Limited due to dementia





Allergies:


Allergies:





Allergies








Coded Allergies Type Severity Reaction Last Updated Verified


 


  ibuprofen Allergy Severe Anaphylaxis 7/14/21 Yes


 


  Penicillins Allergy Unknown  7/14/21 Yes











Physical Exam:


PE:





General: alert, no acute distress.


Skin: warm, dry and intact, no erythema, no rash. 


HENT: bilateral external ears normal, oropharynx moist, nose normal.


Head::  Normocephalic, atraumatic.


Neck:   Trachea midline.


Eyes: EOMI, Normal conjunctiva, No drainage


CARDIOVASCULAR: Irregular


RESPIRATORY:  No respiratory distress


Back:  Full range of motion.


MUSCULOSKELETAL:  Full range of motion of bilateral upper and lower extremities.


GASTROINTESTINAL: Abdomen soft without rebound or guarding.


NEUROLOGICAL:  Alert and noted to person, .  No neurological deficits observed


Psychiatric:  Cooperative.  Normal judgment





Current Patient Data:


Vital Signs:





                                   Vital Signs








  Date Time  Temp Pulse Resp B/P (MAP) Pulse Ox O2 Delivery O2 Flow Rate FiO2


 


8/8/21 08:38 97.8 118 22 143/76 96 Room Air  











EKG:


EKG:


[]





Radiology/Procedures:


Radiology/Procedures:


[]


Impressions:





Performed at 0 835


Rate 92


Atrial fibrillation


No ST elevation


No ST depression


No acute MI


[]





Heart Score:


C/O Chest Pain:  Yes


HEART Score for Chest Pain:  








HEART Score for Chest Pain Response (Comments) Value


 


History Slighlty/Non-Suspicious 0


 


ECG Nonspecific Repolarizatio 1


 


Age > 65 2


 


Risk Factors                            1 or 2 Risk Factors 1


 


Troponin >1-<3x Normal Limit 1


 


Total  5








Risk Factors:


Risk Factors:  DM, Current or recent (<one month) smoker, HTN, HLP, family 

history of CAD, obesity.


Risk Scores:


Score 0 - 3:  2.5% MACE over next 6 weeks - Discharge Home


Score 4 - 6:  20.3% MACE over next 6 weeks - Admit for Clinical Observation


Score 7 - 10:  72.7% MACE over next 6 weeks - Early Invasive Strategies





Course & Med Decision Making:


Course & Med Decision Making


Pertinent Labs and Imaging studies reviewed. (See chart for details)





[] Patient was evaluated for chief complaint of chest pain.  .  Patient's heart 

rate on monitor 110's.  Work-up included laboratory analysis radiologic imaging 

and EKG.  EKG performed shows a heart rate of 92 A. fib rate controlled, chest 

x-ray no acute abnormalities.  Patient's troponin 0 0.04, BNP pending.





Patient started on Cardizem--10 mg IV bolus followed by titratable drip.  

Patient's heart rate improved post bolus to 92 bpm.





Patient admitted to Dr. Dang.





Vinny Disclaimer:


Dragon Disclaimer:


This electronic medical record was generated, in whole or in part, using a voice

 recognition dictation system.





Departure


Departure:


Impression:  


   Primary Impression:  


   Atrial fibrillation


   Additional Impression:  


   Dementia


Disposition:  09 ADMITTED AS INPATIENT


Admitting Physician:  Mani Dang


Condition:  STABLE


Referrals:  


MIGUEL LIPSCOMB MD (PCP)











DOUGLAS VICK DO             Aug 8, 2021 08:47

## 2021-08-08 NOTE — NUR
The patient, CRUZ VIVAS, 86 y/o, F admitted by FER DANG MD, was given written 
information regarding hospital policies, unit procedures and contact persons.  



Valuables were checked and left with patient at bedside. Pt's vital signs were taken, refer 
to chart. Dr. Dang at bedside, discussing admission and pt's condition. Pt is stable at this 
time with controlled heart rate of 99 in what appears to be Afib. Pt was admitted on a 
Cardizem drip running at 5 ml/hr. Dr. Dang requested patient be increased to 10 ml/hr. Will 
CTM and assess as needed.

## 2021-08-08 NOTE — HP
ADMIT DATE: 08/08/2021

ATTENDING PHYSICIAN:  Dr. Dang.



CHIEF COMPLAINT:  Shortness of breath and palpitations.



HISTORY OF PRESENT ILLNESS:  The patient is an 85-year-old female brought in by 

her family.  She lives at home with her elderly .  She has had new onset 

of shortness of breath, some chest pressure.  She was found to be in atrial 

fibrillation with rapid ventricular rate.  In the ED, workup showed a chest 

x-ray did not have any signs of heart failure.  There is no infiltrate.  No 

recent COVID exposure.  She was given Cardizem and by the time I saw her, she 

was moved to our unit.  She was on a Cardizem drip at 5 mg an hour.  Heart rate 

is now between 100-110, irregularly irregular.  She is admitted then for new 

onset atrial fibrillation with rapid ventricular rate.



Unfortunately, the patient is profoundly demented.  She cannot give much 

history.  She has a previous history of paroxysmal atrial fibrillation, profound

dementia and a remote history of deep vein thrombosis, although she is off of 

anticoagulation.



ALLERGIES:  SHE HAS ALLERGIES TO PENICILLIN, IBUPROFEN.  EXACT REACTION IS 

UNCLEAR.



CURRENT SCHEDULED MEDICATIONS:  Includes albuterol, diltiazem 180 mg p.o. daily,

dorzolamide eye drops, insulin Lantus and regular, Xalatan eye drops, losartan, 

pravastatin, and triamterene/hydrochlorothiazide.



SOCIAL HISTORY:  She is a nonsmoker, nondrinker.



FAMILY HISTORY:  Unobtainable due to the patient's confusion.



REVIEW OF SYSTEMS:  Significant for the symptoms that brought her here.  She 

denies any recent fevers, chills, cough, congestion.  All other systems were 

turned to be negative.



PHYSICAL EXAMINATION:

GENERAL:  When I saw her, this is a pleasant, but confused elderly female.

INITIAL VITAL SIGNS:  Showed blood pressure 112/73, pulse is irregularly 

irregular between 100-110 per minute.  Her oxygen saturation 96% on room air and

she is afebrile.

HEENT:  Head is without trauma.  Pupils are reactive.  Sclerae nonicteric.  

Oropharynx is clear.

NECK:  Supple.  No stridor.

LUNGS:  Otherwise clear.

CARDIOVASCULAR:  Showed regular heart tones, irregularly irregular rhythm.  No 

gallops.  Peripheral pulses are palpable and full.

ABDOMEN:  Soft, scaphoid, nontender, no organomegaly.  Bowel sounds are 

hypoactive.

EXTREMITIES:  Showed no edema.

NEUROLOGIC:  Focally intact.  Speech is fluent.  She had no focal deficits.  She

is pleasantly confused.

SKIN:  Otherwise, warm and dry.



CURRENT LABORATORY STUDIES:  Admission hemoglobin was 12.2 g/dL with a white 

count of 7700.  Chemistry panel:  Sodium 136 mEq, potassium 3.4.  The first 

cardiac enzymes were negative for coronary ischemia.  Nonfasting blood sugar 

193.



ASSESSMENT:

1.  An 85-year-old female with new onset atrial fibrillation with rapid 

ventricular rate.

2.  Essential hypertension as underlying cause for her atrial fibrillation.

3.  Insulin dependent diabetes.

4.  Profound dementia.



PLAN:

1.  Admit to the intensive care.

2.  Continue Cardizem drip as ordered.

3.  Serial cardiac enzymes.

4.  I shall try to contact her family to ascertain her code status.

5.  I will call her PCP's office to see if she has had a recent echocardiogram.







BENJA

DR: Duane   DD: 08/08/2021 10:43

DT: 08/08/2021 11:50   TID: [id number]

CC:     MIGUEL LIPSCOMB MD

## 2021-08-08 NOTE — EKG
[facility] [address], [place] [address]

Test Date:    2021               Test Time:    08:35:10

Pat Name:     CRUZ VIVAS          Department:   

Patient ID:   [id number]           Room:          

Gender:       F                        Technician:   ABELARDO

:          1935               Requested By: DOUGLAS VICK

Order Number: [id number]            Reading MD:     

                                 Measurements

Intervals                              Axis          

Rate:         92                       P:            

ID:                                    QRS:          -26

QRSD:         90                       T:            30

QT:           378                                    

QTc:          473                                    

                           Interpretive Statements

IRREGULAR RHYTHM, NO P-WAVE FOUND

VENTRICULAR PREMATURE COMPLEX(ES)

LEFTWARD AXIS

ABNORMAL ECG

RI6.02

No previous ECG available for comparison

## 2021-08-08 NOTE — RAD
Single view chest dated 8/8/2021 8:47 AM:



COMPARISON: 7/15/2021



Clinical Indication: Palpitations.



Findings:



Single upright portable exam of the chest was performed. Heart and mediastinal contours are stable. T
here is a prominent perihilar linear markings, unchanged. No consolidation or pleural effusion. No pn
eumothorax.



IMPRESSION:



No acute radiographic abnormality. Stable findings compared to 7/15/2021.



Electronically signed by: Brenton Bennett MD (8/8/2021 8:47 AM) [id number]

## 2021-08-09 VITALS — SYSTOLIC BLOOD PRESSURE: 118 MMHG | DIASTOLIC BLOOD PRESSURE: 64 MMHG

## 2021-08-09 VITALS — SYSTOLIC BLOOD PRESSURE: 134 MMHG | DIASTOLIC BLOOD PRESSURE: 67 MMHG

## 2021-08-09 VITALS — SYSTOLIC BLOOD PRESSURE: 124 MMHG | DIASTOLIC BLOOD PRESSURE: 61 MMHG

## 2021-08-09 VITALS — DIASTOLIC BLOOD PRESSURE: 63 MMHG | SYSTOLIC BLOOD PRESSURE: 129 MMHG

## 2021-08-09 VITALS — SYSTOLIC BLOOD PRESSURE: 138 MMHG | DIASTOLIC BLOOD PRESSURE: 66 MMHG

## 2021-08-09 VITALS — DIASTOLIC BLOOD PRESSURE: 61 MMHG | SYSTOLIC BLOOD PRESSURE: 115 MMHG

## 2021-08-09 VITALS — DIASTOLIC BLOOD PRESSURE: 63 MMHG | SYSTOLIC BLOOD PRESSURE: 131 MMHG

## 2021-08-09 VITALS — DIASTOLIC BLOOD PRESSURE: 61 MMHG | SYSTOLIC BLOOD PRESSURE: 127 MMHG

## 2021-08-09 VITALS — DIASTOLIC BLOOD PRESSURE: 64 MMHG | SYSTOLIC BLOOD PRESSURE: 132 MMHG

## 2021-08-09 VITALS — SYSTOLIC BLOOD PRESSURE: 127 MMHG | DIASTOLIC BLOOD PRESSURE: 65 MMHG

## 2021-08-09 VITALS — DIASTOLIC BLOOD PRESSURE: 56 MMHG | SYSTOLIC BLOOD PRESSURE: 120 MMHG

## 2021-08-09 VITALS — SYSTOLIC BLOOD PRESSURE: 122 MMHG | DIASTOLIC BLOOD PRESSURE: 63 MMHG

## 2021-08-09 VITALS — DIASTOLIC BLOOD PRESSURE: 63 MMHG | SYSTOLIC BLOOD PRESSURE: 114 MMHG

## 2021-08-09 VITALS — DIASTOLIC BLOOD PRESSURE: 61 MMHG | SYSTOLIC BLOOD PRESSURE: 124 MMHG

## 2021-08-09 VITALS — SYSTOLIC BLOOD PRESSURE: 118 MMHG | DIASTOLIC BLOOD PRESSURE: 55 MMHG

## 2021-08-09 VITALS — SYSTOLIC BLOOD PRESSURE: 117 MMHG | DIASTOLIC BLOOD PRESSURE: 97 MMHG

## 2021-08-09 VITALS — SYSTOLIC BLOOD PRESSURE: 116 MMHG | DIASTOLIC BLOOD PRESSURE: 58 MMHG

## 2021-08-09 LAB
CHOLEST/HDLC SERPL: 1 {RATIO}
HDLC SERPL-MCNC: 83 MG/DL (ref 40–60)
LDLC: 51 MG/DL (ref 0–100)
THYROID STIM HORMONE (TSH): 1.46 UIU/ML (ref 0.36–3.74)
TRIGL SERPL-MCNC: 49 MG/DL (ref 0–150)
VLDLC: 9 MG/DL (ref 0–40)

## 2021-08-09 RX ADMIN — INSULIN LISPRO SCH UNITS: 100 INJECTION, SOLUTION INTRAVENOUS; SUBCUTANEOUS at 08:32

## 2021-08-09 RX ADMIN — APIXABAN SCH MG: 5 TABLET, FILM COATED ORAL at 20:40

## 2021-08-09 RX ADMIN — PANTOPRAZOLE SODIUM PRN MLS/HR: 40 INJECTION, POWDER, FOR SOLUTION INTRAVENOUS at 03:49

## 2021-08-09 RX ADMIN — INSULIN LISPRO SCH UNITS: 100 INJECTION, SOLUTION INTRAVENOUS; SUBCUTANEOUS at 11:57

## 2021-08-09 RX ADMIN — APIXABAN SCH MG: 5 TABLET, FILM COATED ORAL at 08:32

## 2021-08-09 RX ADMIN — INSULIN LISPRO SCH UNITS: 100 INJECTION, SOLUTION INTRAVENOUS; SUBCUTANEOUS at 17:14

## 2021-08-09 RX ADMIN — MORPHINE SULFATE PRN MG: 2 INJECTION, SOLUTION INTRAMUSCULAR; INTRAVENOUS at 20:39

## 2021-08-09 NOTE — PN
DATE: 08/09/2021

ATTENDING PHYSICIAN:  Dr. Dang.



SUBJECTIVE:  No new complaints.  She is pleasantly confused, but alert.



OBJECTIVE FINDINGS:

VITAL SIGNS:  Blood pressure this morning is 127/65, pulse is irregularly 

irregular between 90 and 110, oxygen saturation 95% on 2 liters by nasal 

cannula.  She is afebrile.

HEENT:  Head is without trauma.  Pupils are reactive.  Sclerae nonicteric.  

Oropharynx clear.

NECK:  Supple, no bruits identified.

LUNGS:  Clear.

CARDIOVASCULAR:  Irregularly irregular rhythm.  No gallops.

ABDOMEN:  Soft.

EXTREMITIES:  Without edema.

NEUROLOGIC:  Focally intact.  Pleasantly confused.



LABORATORY STUDIES:  Cardiac enzymes are negative for coronary ischemia.  Sugars

are a bit high and this will be addressed.



ASSESSMENT:   An 85-year-old female with:

1.  Atrial fibrillation with rapid ventricular rate.

2.  Underlying dementia.

3.  Coronary ischemia ruled out.

4.  Type 2 diabetes.



PLAN:

1.  Juan per Cardiology.

2.  Echocardiogram ordered.

3.  We should convert her to oral Cardizem at a higher dose.

4.  Glucose control.







ALEKSANDAR/PAULINE

DR: ALEKSANDAR/ruchi   DD: 08/09/2021 10:10

DT: 08/09/2021 21:37   TID: [id number]

CC:     MIGUEL LIPSCOMB MD

## 2021-08-09 NOTE — NUR
Pt awake in bed at change of shift. Pt is A&Ox2-3, pleasantly confused/forgetful and 
frequently repeats herself. Pt ate HS snack independently and took medications whole without 
difficulty. Pt up to BSC x1 assist 5 times during night, very unsteady on feet and c/o right 
hip/leg pain. Pt was continent all of shift. Pt sleep off and on during night. Bed alarm on.

## 2021-08-09 NOTE — NUR
pt becoming increasingly confused throughout the day. pt hollering out for daughter and 
, thinks she is at home, pulling off tele, o2, spo2 and bp cuff. pt constantly asking 
to get up and walk around. pt was a two max almost total lift to the bedside commode twice 
today, informed pt she is not able to get up and walk around at this time. spoke with pt and 
ot, they will see pt first thing in the morning.

## 2021-08-09 NOTE — PDOC2
NELLY ACUÑA APRN 8/9/21 0850:


CARDIAC CONSULT


DATE OF CONSULT


DOS:


DATE: 8/9/21 


TIME: 08:40





REASON FOR CONSULT


Reason for Consult


AFIB





REFERRING PHYSICIAN


Referring Physician


Dr. Dang





SOURCE


Source:  Chart review, Patient





HPI


History of Present Illness


This is an 86 yo female who presented secondary to chest pain. HPI obtained from

chart review as patient unable to tell me what brought her to the hospital. 

Patient reported experienced some shortness of breath and chest pressure, which 

prompted her arrival. Initially felt to be in AFIB/flutter upon arrival. Was 

initiated on Cardizem gtt. Review of EKG and tele appears to be SR/ST with 

frequents PAC's. She presently denies any chest pain, palpitations, dizziness, 

diaphoresis, or nausea/vomiting. Does reports some shortness of breath.





PAST MEDICAL HISTORY


Cardiovascular:  hyperipidemia


Pulmonary:  Asthma


CENTRAL NERVOUS SYSTEM:  Dementia


Heme/Onc:  Other (DVT)


Musculoskeletal:  Osteoarthritis


Renal/:  Chronic renal insuff


Endocrine:  Diabetes





PAST SURGICAL HISTORY


Past Surgical History:  Hysterectomy





FAMILY HISTORY


Family History:  Family History Unknown





SOCIAL HISTORY


Smoke:  No


ALCOHOL:  none


Drugs:  None


Lives:  with Family





CURRENT MEDICATIONS


Current Medications





Current Medications


Diltiazem HCl (Cardizem Iv Push) 10 mg 1X  ONCE IVP  Last administered on 

8/8/21at 09:13;  Start 8/8/21 at 09:00;  Stop 8/8/21 at 09:01;  Status DC


Diltiazem HCl 125 mg/Sodium Chloride 125 ml @ 5 mls/hr CONT  PRN IV SEE I/O 

RECORD Last administered on 8/9/21at 03:49;  Start 8/8/21 at 09:00


Sodium Chloride 100 ml @  As Directed STK-MED ONCE .ROUTE ;  Start 8/8/21 at 

09:04;  Stop 8/8/21 at 09:04;  Status DC


Diltiazem HCl (Cardizem) 125 mg STK-MED ONCE IV ;  Start 8/8/21 at 09:04;  Stop 

8/8/21 at 09:04;  Status DC


Diltiazem HCl (Cardizem) 125 mg STK-MED ONCE IV ;  Start 8/8/21 at 09:07;  Stop 

8/8/21 at 09:07;  Status DC


Apixaban (Eliquis) 5 mg BID PO  Last administered on 8/9/21at 08:32;  Start 

8/8/21 at 21:00


Albuterol Sulfate (Ventolin Hfa Inhaler) 2 puff PRN Q4HRS  PRN INH wheezing;  

Start 8/8/21 at 14:00


Insulin Human Lispro (HumaLOG) 0-7 UNITS TIDWMEALS SQ  Last administered on 

8/9/21at 08:32;  Start 8/8/21 at 17:00


Dextrose (Dextrose 50%-Water Syringe) 12.5 gm PRN Q15MIN  PRN IV SEE COMMENTS;  

Start 8/8/21 at 17:00


Albuterol/ Ipratropium (Duoneb) 3 ml 1X  ONCE NEB  Last administered on 8/8/21at

17:34;  Start 8/8/21 at 17:30;  Stop 8/8/21 at 17:39;  Status DC





Active Scripts


Active


Reported


Eliquis (Apixaban) 5 Mg Tablet 5 Mg PO BID


Albuterol Sulfate Neb Soln (Albuterol Sulfate) 1.25 Mg/3 Ml Vial.neb 1 Vial NEB 

Q6HRS


Proair Hfa Inhaler (Albuterol Sulfate) 8.5 Gm Hfa.aer.ad 2 Puff IH PRN Q4-6HRS 

PRN 21 Days


Dorzolamide-Timolol Eye Drops (Dorzolamide Hcl/Timolol Maleat) 10 Ml Drops 1 

Drop OD DAILY07


Xalatan (Latanoprost) 2.5 Ml Drops 1 Drop OU BID76


Triamterene-Hctz 37.5-25 Mg Tb (Triamterene/Hydrochlorothiazid) 1 Each Tablet 1 

Tab PO DAILY


Pravastatin Sodium 40 Mg Tablet 1 Tab PO QHS


Losartan Potassium ** (Losartan Potassium) 25 Mg Tablet 7.5 Mg PO DAILY


Cartia Xt (Diltiazem Hcl) 180 Mg Cap.er.24h 180 Mg PO DAILY08


Basaglar Kwikpen U-100 (Insulin Glargine,Hum.rec.anlog) 100 Unit/1 Ml Insuln.pen

35 Unit SQ DAILY07





ALLERGIES


Allergies:  


Coded Allergies:  


     ibuprofen (Verified  Allergy, Severe, Anaphylaxis, 7/14/21)


     Penicillins (Verified  Allergy, Unknown, 7/14/21)





ROS


Review of Systems


14 point ROS conducted with pertinent positives noted above in HPI, although 

limited due to dementia





PHYSICAL EXAM


General:  Alert, Cooperative, No acute distress, Other (oriented to person only 

)


Lungs:  Other (diminished bases)


Heart:  Regular rate (SR/ST with frequent PAC's)


Abdomen:  Soft


Extremities:  No edema, Normal pulses


Skin:  No rashes, No breakdown


Neuro:  Normal speech, Sensation intact


Psych/Mental Status:  Mood NL, Other (forgetful )


MUSCULOSKELETAL:  Osteoarthritic changes both hands





VITALS


Vital Signs





Vital Signs








  Date Time  Temp Pulse Resp B/P (MAP) Pulse Ox O2 Delivery O2 Flow Rate FiO2


 


8/9/21 07:58  100 20 117/97 (104) 95 Nasal Cannula 2.0 


 


8/9/21 05:40 97.8       











LABS


LABS





Laboratory Tests








Test


 8/8/21


08:50 8/8/21


12:08 8/8/21


15:50 8/8/21


16:53


 


White Blood Count


 7.7 x10^3/uL


(4.0-11.0) 


 


 





 


Red Blood Count


 4.26 x10^6/uL


(3.50-5.40) 


 


 





 


Hemoglobin


 12.2 g/dL


(12.0-15.5) 


 


 





 


Bedside Hemoglobin 12.6 gm/dL    


 


Hematocrit


 37.2 %


(36.0-47.0) 


 


 





 


Bedside Hematocrit 37 %    


 


Mean Corpuscular Volume 87 fL ()    


 


Mean Corpuscular Hemoglobin 29 pg (25-35)    


 


Mean Corpuscular Hemoglobin


Concent 33 g/dL


(31-37) 


 


 





 


Red Cell Distribution Width


 14.7 %


(11.5-14.5) 


 


 





 


Platelet Count


 323 x10^3/uL


(140-400) 


 


 





 


Neutrophils (%) (Auto) 58 % (31-73)    


 


Lymphocytes (%) (Auto) 26 % (24-48)    


 


Monocytes (%) (Auto) 12 % (0-9)    


 


Eosinophils (%) (Auto) 3 % (0-3)    


 


Basophils (%) (Auto) 1 % (0-3)    


 


Neutrophils # (Auto)


 4.4 x10^3uL


(1.8-7.7) 


 


 





 


Lymphocytes # (Auto)


 2.0 x10^3/uL


(1.0-4.8) 


 


 





 


Monocytes # (Auto)


 0.9 x10^3/uL


(0.0-1.1) 


 


 





 


Eosinophils # (Auto)


 0.2 x10^3/uL


(0.0-0.7) 


 


 





 


Basophils # (Auto)


 0.1 x10^3/uL


(0.0-0.2) 


 


 





 


Bedside Sodium


 136 mmol/L


(135-145) 


 


 





 


Sodium Level


 136 mmol/L


(136-145) 


 


 





 


Bedside Potassium


 3.4 mmol/L


(3.5-5.0) 


 


 





 


Potassium Level


 3.4 mmol/L


(3.5-5.1) 


 


 





 


Bedside Chloride


 99 mmol/L


() 


 


 





 


Chloride Level


 100 mmol/L


() 


 


 





 


Carbon Dioxide Level


 26 mmol/L


(21-32) 


 


 





 


Bedside Total CO2


 24 mmol/L


(23-32) 


 


 





 


Anion Gap


 18 mmol/L


(6-14) 


 


 





 


Bedside Blood Urea Nitrogen


 18 mg/dL


(8-26) 


 


 





 


Blood Urea Nitrogen


 17 mg/dL


(7-20) 


 


 





 


Creatinine


 1.2 mg/dL


(0.6-1.0) 


 


 





 


Bedside Creatinine


 1.2 mg/dL


(0.5-1.4) 


 


 





 


Estimated GFR


(Cockcroft-Gault) 42.7 


 


 


 





 


BUN/Creatinine Ratio 14 (6-20)    


 


Glucose Level


 193 mg/dL


(60-99) 


 


 





 


Calcium Level


 9.2 mg/dL


(8.5-10.1) 


 


 





 


Bedside Ionized Calcium (Lydia)


 1.17 mmol/L


(1.13-1.32) 


 


 





 


Total Bilirubin


 0.3 mg/dL


(0.2-1.0) 


 


 





 


Aspartate Amino Transf


(AST/SGOT) 15 U/L (15-37) 


 


 


 





 


Alanine Aminotransferase


(ALT/SGPT) 13 U/L (14-59) 


 


 


 





 


Alkaline Phosphatase


 86 U/L


() 


 


 





 


Bedside Troponin I


 0.04 ng/ml


(<0.08) 


 


 





 


NT-Pro-B-Type Natriuretic


Peptide 992 pg/mL


(0-449) 


 


 





 


Total Protein


 7.2 g/dL


(6.4-8.2) 


 


 





 


Albumin


 2.9 g/dL


(3.4-5.0) 


 


 





 


Albumin/Globulin Ratio 0.7 (1.0-1.7)    


 


Glucose (Fingerstick)


 


 150 mg/dL


(70-99) 


 235 mg/dL


(70-99)


 


Troponin I Quantitative


 


 


 0.019 ng/mL


(0-0.055) 





 


Test


 8/8/21


20:11 8/9/21


08:25 


 





 


Glucose (Fingerstick)


 216 mg/dL


(70-99) 336 mg/dL


(70-99) 


 














ASSESSMENT/PLAN


Assessment/Plan


1.  Chest pain, atypical; initial trop negative 


2.  Tachyarrhythmia; on Cardizem gtt. Initially thought to be in AFIB, but EKG 

and tele appears to be sinus tach with frequent PAC's 


3.  Hypertension; controlled 


4.  Hyperlipidmia; statin 


5.  Diabetes, II


6.  CKD


7.  Hypokalemia 


8.  Dementia 


9.  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 





Recommendations





Trend trop


Lipids, TSH, Mg level


Echo to assess LV systolic function 


Resume oral Cardizem 


Eliquis for h/o DVT


Outpatient event monitor





ADAL MCHUGH MD 8/9/21 1705:


CARDIAC CONSULT


ASSESSMENT/PLAN


Assessment/Plan


Patient seen and examined.  Agree with NP's assessment and plan.


Chest pain with atypical features.  Myocardial infarction has been ruled out.


Patient was thought to be having atrial fibrillation but review of EKG and 

telemetry showed sinus tachycardia with frequent PACs.  


Agree with stopping Cardizem drip and resume oral Cardizem.


Continue Eliquis for history of DVT.


Plan 2D echo and outpatient event monitor recording.


Thank you for your consultation











NELLY ACUÑA            Aug 9, 2021 08:50


ADAL MCHUGH MD            Aug 9, 2021 17:05

## 2021-08-10 VITALS — DIASTOLIC BLOOD PRESSURE: 74 MMHG | SYSTOLIC BLOOD PRESSURE: 121 MMHG

## 2021-08-10 VITALS — SYSTOLIC BLOOD PRESSURE: 125 MMHG | DIASTOLIC BLOOD PRESSURE: 64 MMHG

## 2021-08-10 RX ADMIN — MORPHINE SULFATE PRN MG: 2 INJECTION, SOLUTION INTRAMUSCULAR; INTRAVENOUS at 03:20

## 2021-08-10 RX ADMIN — APIXABAN SCH MG: 5 TABLET, FILM COATED ORAL at 08:26

## 2021-08-10 RX ADMIN — INSULIN LISPRO SCH UNITS: 100 INJECTION, SOLUTION INTRAVENOUS; SUBCUTANEOUS at 08:29

## 2021-08-10 NOTE — DS
DATE OF DISCHARGE: 08/10/2021

ATTENDING PHYSICIAN:  Dr. Dang.



FINAL DISCHARGE DIAGNOSES:

1.  Atrial fibrillation with rapid ventricular rate, controlled.

2.  Underlying dementia.

3.  Coronary ischemia ruled out.

4.  Intrinsic asthma, nonsmoker.

5.  Type 2 diabetes mellitus, insulin-dependent.



HISTORY AND PHYSICAL:  The patient is a very pleasant demented female who has 

been cared for by her elderly  at home.  She comes in with some shortness

of breath and palpitations.  She was found to be in atrial fibrillation with 

rapid ventricular rate.  She was given Cardizem intravenously.  She was taking 

Cardizem p.o. at home.  She was admitted for further treatment and evaluation.



PHYSICAL EXAMINATION:  Please see the dictated note.



PERTINENT LABORATORY AND X-RAY STUDIES:  Admission hemoglobin was 12.2 g/dL with

a white count of 7700.  Her chemistry panel was fairly unremarkable with a 

sodium of 136 mEq, potassium 3.4 mEq, creatinine is 1.2.  Nonfasting blood sugar

on admission was 193.  Cardiac enzymes were drawn, they were negative for 

coronary ischemia.  Subsequent blood sugars were also drawn.  She had 

improvement down to the low 200s and upper 100s for her blood sugar.



COURSE IN THE HOSPITAL:  The patient was admitted to our intensive care.  She 

was started on a Cardizem drip to maintain.  Formal Cardiology consultation was 

obtained and their recommendation is on the chart.  They did not feel this is 

ischemic.  They increased the Cardizem dose and switch it over to oral Cardizem 

at a higher dose.  They are planning to have place an event monitor.  She has 

had a recent event monitor placed, which was not diagnostic.  By the 3rd 

hospital day, the patient was comfortable.  There were not any signs of heart 

failure.  Chest x-ray was clear.  She was ready for discharge.  I had a chance 

to discuss the case with the daughter who is a registered nurse.  She is 

discharged home then with the following changes in meds.  Her diltiazem has been

increased to 360 p.o. daily.  In addition, she will continue her albuterol as 

needed, Eliquis 5 mg b.i.d., insulin as scheduled, Xalatan eye drops, losartan, 

pravastatin, and triamterene/hydrochlorothiazide.  She will follow up with Dr. Lipscomb at scheduled time.  During this hospitalization, she had issues, but 

presented as a DNR per advanced directives.  We can verify this through Dr. Lipscomb's office.  She was discharged then from our hospital in stable 

condition with explicit written and followup care.



TOTAL DISCHARGE TIME SPENT:  39 minutes.







BENJA

DR: Duane   DD: 08/10/2021 10:09

DT: 08/10/2021 10:39   TID: [id number]

CC:     MIGUEL LIPSCOMB MD

## 2021-08-10 NOTE — NUR
DISCHARGE



SPOKE WITH  BY PHONE, RE: CONCERN WITH HIS ABILITY TO TRANSFER PT WITHOUT ANOTHER TO 
ASSIST. HE CALLED BACK WITH AN ALTERNATE PLAN FOR TRANSPORT HOME. PT IV ET TELE 
DISCONTINUED. DISCUSSED DISCHARGE INSTRUCTIONS, ALSO DISCUSSED WITH  BY PHONE. 
[facility] HERE FOR PT . W/C SENT WITH PT, [facility] TO RETURN AFTER 
DROP OFF. RX FOR ESMER SENT WITH PT AT TIME OF DISCHARGE.

## 2021-08-10 NOTE — PDOC
CARDIO Progress Notes


Date & Time


Date of Service


DATE: 8/10/21 


TIME: 08:45


Time of Evaluation


08:45





Subjective


Notes


no chest pain, dizziness, diaphoresis, SOA better





Vitals


Vitals





Vital Signs








  Date Time  Temp Pulse Resp B/P (MAP) Pulse Ox O2 Delivery O2 Flow Rate FiO2


 


8/10/21 08:26  87  121/74    


 


8/10/21 06:55 98.6  20  94  2.0 


 


8/10/21 03:50      Nasal Cannula  








Weight


Weight [ ]





Input and Output


I.O.











Intake and Output 


 


 8/10/21





 07:00


 


Intake Total 650 ml


 


Balance 650 ml


 


 


 


Intake Oral 650 ml


 


# Voids 7











Laboratory


Labs





Laboratory Tests








Test


 8/8/21


08:50 8/8/21


12:08 8/8/21


15:50 8/8/21


16:53


 


White Blood Count


 7.7 x10^3/uL


(4.0-11.0) 


 


 





 


Red Blood Count


 4.26 x10^6/uL


(3.50-5.40) 


 


 





 


Hemoglobin


 12.2 g/dL


(12.0-15.5) 


 


 





 


Bedside Hemoglobin 12.6 gm/dL    


 


Hematocrit


 37.2 %


(36.0-47.0) 


 


 





 


Bedside Hematocrit 37 %    


 


Mean Corpuscular Volume 87 fL ()    


 


Mean Corpuscular Hemoglobin 29 pg (25-35)    


 


Mean Corpuscular Hemoglobin


Concent 33 g/dL


(31-37) 


 


 





 


Red Cell Distribution Width


 14.7 %


(11.5-14.5) 


 


 





 


Platelet Count


 323 x10^3/uL


(140-400) 


 


 





 


Neutrophils (%) (Auto) 58 % (31-73)    


 


Lymphocytes (%) (Auto) 26 % (24-48)    


 


Monocytes (%) (Auto) 12 % (0-9)    


 


Eosinophils (%) (Auto) 3 % (0-3)    


 


Basophils (%) (Auto) 1 % (0-3)    


 


Neutrophils # (Auto)


 4.4 x10^3uL


(1.8-7.7) 


 


 





 


Lymphocytes # (Auto)


 2.0 x10^3/uL


(1.0-4.8) 


 


 





 


Monocytes # (Auto)


 0.9 x10^3/uL


(0.0-1.1) 


 


 





 


Eosinophils # (Auto)


 0.2 x10^3/uL


(0.0-0.7) 


 


 





 


Basophils # (Auto)


 0.1 x10^3/uL


(0.0-0.2) 


 


 





 


Bedside Sodium


 136 mmol/L


(135-145) 


 


 





 


Sodium Level


 136 mmol/L


(136-145) 


 


 





 


Bedside Potassium


 3.4 mmol/L


(3.5-5.0) 


 


 





 


Potassium Level


 3.4 mmol/L


(3.5-5.1) 


 


 





 


Bedside Chloride


 99 mmol/L


() 


 


 





 


Chloride Level


 100 mmol/L


() 


 


 





 


Carbon Dioxide Level


 26 mmol/L


(21-32) 


 


 





 


Bedside Total CO2


 24 mmol/L


(23-32) 


 


 





 


Anion Gap


 18 mmol/L


(6-14) 


 


 





 


Bedside Blood Urea Nitrogen


 18 mg/dL


(8-26) 


 


 





 


Blood Urea Nitrogen


 17 mg/dL


(7-20) 


 


 





 


Creatinine


 1.2 mg/dL


(0.6-1.0) 


 


 





 


Bedside Creatinine


 1.2 mg/dL


(0.5-1.4) 


 


 





 


Estimated GFR


(Cockcroft-Gault) 42.7 


 


 


 





 


BUN/Creatinine Ratio 14 (6-20)    


 


Glucose Level


 193 mg/dL


(60-99) 


 


 





 


Calcium Level


 9.2 mg/dL


(8.5-10.1) 


 


 





 


Bedside Ionized Calcium (Lydia)


 1.17 mmol/L


(1.13-1.32) 


 


 





 


Total Bilirubin


 0.3 mg/dL


(0.2-1.0) 


 


 





 


Aspartate Amino Transf


(AST/SGOT) 15 U/L (15-37) 


 


 


 





 


Alanine Aminotransferase


(ALT/SGPT) 13 U/L (14-59) 


 


 


 





 


Alkaline Phosphatase


 86 U/L


() 


 


 





 


Bedside Troponin I


 0.04 ng/ml


(<0.08) 


 


 





 


NT-Pro-B-Type Natriuretic


Peptide 992 pg/mL


(0-449) 


 


 





 


Total Protein


 7.2 g/dL


(6.4-8.2) 


 


 





 


Albumin


 2.9 g/dL


(3.4-5.0) 


 


 





 


Albumin/Globulin Ratio 0.7 (1.0-1.7)    


 


Glucose (Fingerstick)


 


 150 mg/dL


(70-99) 


 235 mg/dL


(70-99)


 


Troponin I Quantitative


 


 


 0.019 ng/mL


(0-0.055) 





 


Test


 8/8/21


20:11 8/9/21


08:25 8/9/21


08:59 8/9/21


11:34


 


Glucose (Fingerstick)


 216 mg/dL


(70-99) 336 mg/dL


(70-99) 


 247 mg/dL


(70-99)


 


Magnesium Level


 


 


 1.8 mg/dL


(1.8-2.4) 





 


Troponin I Quantitative


 


 


 < 0.017 ng/mL


(0-0.055) 





 


Triglycerides Level


 


 


 49 mg/dL


(0-150) 





 


Cholesterol Level


 


 


 143 mg/dL


(0-200) 





 


LDL Cholesterol, Calculated


 


 


 51 mg/dL


(0-100) 





 


VLDL Cholesterol, Calculated   9 mg/dL (0-40)  


 


Non-HDL Cholesterol Calculated


 


 


 60 mg/dL


(0-129) 





 


HDL Cholesterol


 


 


 83 mg/dL


(40-60) 





 


Cholesterol/HDL Ratio   1.0  


 


Thyroid Stimulating Hormone


(TSH) 


 


 1.455 uIU/mL


(0.358-3.740) 





 


Test


 8/9/21


17:09 8/9/21


19:57 8/10/21


07:21 





 


Glucose (Fingerstick)


 270 mg/dL


(70-99) 217 mg/dL


(70-99) 222 mg/dL


(70-99) 














Physical Exams


HEENT:  Neck Supple W Full Motion


Chest:  Symmetric


Lungs:  Clear to Auscultation, Other (diminished bases)


Heart:  RRR


Abdomen:  Soft N/T


Extremities:  No Edema


Neurology:  alert, follow commands





Assessment


Assessment


1.  Chest pain; trop series negative, AMI ruled out


2.  Tachyarrhythmia; Initially thought to be in AFIB, but EKG and tele appears 

to be ST/SR with frequent PAC's. Possible MAT. No evidence of AFIB


3.  Hypertension; controlled 


4.  Hyperlipidmia; statin 


5.  Diabetes, II


6.  CKD


7.  Hypokalemia; replaced 


8.  Dementia 


9.  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 





Recommendations





Echo to assess LV systolic function 


Cardizem for rate control 


Eliquis for h/o DVT


Consider outpatient event monitor 


Supportive











NELLY ACUÑA           Aug 10, 2021 08:48

## 2021-08-11 ENCOUNTER — HOSPITAL ENCOUNTER (INPATIENT)
Dept: HOSPITAL 63 - ER | Age: 86
LOS: 5 days | Discharge: NURSING, REHAB OR LONG-TERM CARE | DRG: 291 | End: 2021-08-16
Attending: INTERNAL MEDICINE | Admitting: INTERNAL MEDICINE
Payer: MEDICARE

## 2021-08-11 VITALS — WEIGHT: 143.74 LBS | HEIGHT: 64 IN | BODY MASS INDEX: 24.54 KG/M2

## 2021-08-11 VITALS — SYSTOLIC BLOOD PRESSURE: 137 MMHG | DIASTOLIC BLOOD PRESSURE: 77 MMHG

## 2021-08-11 VITALS — DIASTOLIC BLOOD PRESSURE: 78 MMHG | SYSTOLIC BLOOD PRESSURE: 151 MMHG

## 2021-08-11 DIAGNOSIS — I50.33: ICD-10-CM

## 2021-08-11 DIAGNOSIS — D72.829: ICD-10-CM

## 2021-08-11 DIAGNOSIS — F03.90: ICD-10-CM

## 2021-08-11 DIAGNOSIS — Z79.01: ICD-10-CM

## 2021-08-11 DIAGNOSIS — E11.22: ICD-10-CM

## 2021-08-11 DIAGNOSIS — E43: ICD-10-CM

## 2021-08-11 DIAGNOSIS — N17.9: ICD-10-CM

## 2021-08-11 DIAGNOSIS — Z86.718: ICD-10-CM

## 2021-08-11 DIAGNOSIS — I13.0: Primary | ICD-10-CM

## 2021-08-11 DIAGNOSIS — E88.09: ICD-10-CM

## 2021-08-11 DIAGNOSIS — I48.0: ICD-10-CM

## 2021-08-11 DIAGNOSIS — E78.5: ICD-10-CM

## 2021-08-11 DIAGNOSIS — Z88.6: ICD-10-CM

## 2021-08-11 DIAGNOSIS — E78.00: ICD-10-CM

## 2021-08-11 DIAGNOSIS — M19.90: ICD-10-CM

## 2021-08-11 DIAGNOSIS — J44.1: ICD-10-CM

## 2021-08-11 DIAGNOSIS — Z82.3: ICD-10-CM

## 2021-08-11 DIAGNOSIS — Z88.0: ICD-10-CM

## 2021-08-11 DIAGNOSIS — Z82.49: ICD-10-CM

## 2021-08-11 DIAGNOSIS — N18.9: ICD-10-CM

## 2021-08-11 DIAGNOSIS — D68.59: ICD-10-CM

## 2021-08-11 DIAGNOSIS — Z90.710: ICD-10-CM

## 2021-08-11 LAB
ALBUMIN SERPL-MCNC: 2.6 G/DL (ref 3.4–5)
ALBUMIN/GLOB SERPL: 0.7 {RATIO} (ref 1–1.7)
ALP SERPL-CCNC: 84 U/L (ref 46–116)
ALT SERPL-CCNC: 13 U/L (ref 14–59)
AMORPH SED URNS QL MICRO: PRESENT /HPF
ANION GAP SERPL CALC-SCNC: 9 MMOL/L (ref 6–14)
APTT PPP: YELLOW S
AST SERPL-CCNC: 17 U/L (ref 15–37)
BACTERIA #/AREA URNS HPF: 0 /HPF
BASOPHILS # BLD AUTO: 0 X10^3/UL (ref 0–0.2)
BASOPHILS NFR BLD: 0 % (ref 0–3)
BILIRUB SERPL-MCNC: 0.3 MG/DL (ref 0.2–1)
BILIRUB UR QL STRIP: (no result)
BUN/CREAT SERPL: 24 (ref 6–20)
CA-I SERPL ISE-MCNC: 39 MG/DL (ref 7–20)
CALCIUM SERPL-MCNC: 9.1 MG/DL (ref 8.5–10.1)
CHLORIDE SERPL-SCNC: 97 MMOL/L (ref 98–107)
CO2 SERPL-SCNC: 27 MMOL/L (ref 21–32)
CREAT SERPL-MCNC: 1.6 MG/DL (ref 0.6–1)
EOSINOPHIL NFR BLD: 0 % (ref 0–3)
EOSINOPHIL NFR BLD: 0 X10^3/UL (ref 0–0.7)
ERYTHROCYTE [DISTWIDTH] IN BLOOD BY AUTOMATED COUNT: 14.9 % (ref 11.5–14.5)
FIBRINOGEN PPP-MCNC: (no result) MG/DL
GFR SERPLBLD BASED ON 1.73 SQ M-ARVRAT: 30.6 ML/MIN
GLOBULIN SER-MCNC: 4 G/DL (ref 2.2–3.8)
GLUCOSE SERPL-MCNC: 341 MG/DL (ref 70–99)
GLUCOSE UR STRIP-MCNC: 100 MG/DL
GRAN CASTS #/AREA URNS LPF: (no result) /HPF
HCT VFR BLD CALC: 36.6 % (ref 36–47)
HGB BLD-MCNC: 11.9 G/DL (ref 12–15.5)
LYMPHOCYTES # BLD: 2 X10^3/UL (ref 1–4.8)
LYMPHOCYTES NFR BLD AUTO: 15 % (ref 24–48)
MAGNESIUM SERPL-MCNC: 2.2 MG/DL (ref 1.8–2.4)
MCH RBC QN AUTO: 28 PG (ref 25–35)
MCHC RBC AUTO-ENTMCNC: 32 G/DL (ref 31–37)
MCV RBC AUTO: 87 FL (ref 79–100)
MONO #: 1.5 X10^3/UL (ref 0–1.1)
MONOCYTES NFR BLD: 11 % (ref 0–9)
NEUT #: 10.3 X10^3UL (ref 1.8–7.7)
NEUTROPHILS NFR BLD AUTO: 74 % (ref 31–73)
NITRITE UR QL STRIP: (no result)
PLATELET # BLD AUTO: 252 X10^3/UL (ref 140–400)
POTASSIUM SERPL-SCNC: 4.3 MMOL/L (ref 3.5–5.1)
PROT SERPL-MCNC: 6.6 G/DL (ref 6.4–8.2)
RBC # BLD AUTO: 4.19 X10^6/UL (ref 3.5–5.4)
RBC #/AREA URNS HPF: (no result) /HPF (ref 0–2)
SODIUM SERPL-SCNC: 133 MMOL/L (ref 136–145)
SP GR UR STRIP: 1.02
SQUAMOUS #/AREA URNS LPF: (no result) /LPF
UROBILINOGEN UR-MCNC: 0.2 MG/DL
WBC # BLD AUTO: 13.9 X10^3/UL (ref 4–11)
WBC #/AREA URNS HPF: (no result) /HPF (ref 0–4)

## 2021-08-11 PROCEDURE — 94760 N-INVAS EAR/PLS OXIMETRY 1: CPT

## 2021-08-11 PROCEDURE — 83880 ASSAY OF NATRIURETIC PEPTIDE: CPT

## 2021-08-11 PROCEDURE — 81001 URINALYSIS AUTO W/SCOPE: CPT

## 2021-08-11 PROCEDURE — 83735 ASSAY OF MAGNESIUM: CPT

## 2021-08-11 PROCEDURE — 36415 COLL VENOUS BLD VENIPUNCTURE: CPT

## 2021-08-11 PROCEDURE — 82947 ASSAY GLUCOSE BLOOD QUANT: CPT

## 2021-08-11 PROCEDURE — 84484 ASSAY OF TROPONIN QUANT: CPT

## 2021-08-11 PROCEDURE — 94640 AIRWAY INHALATION TREATMENT: CPT

## 2021-08-11 PROCEDURE — 85025 COMPLETE CBC W/AUTO DIFF WBC: CPT

## 2021-08-11 PROCEDURE — 70450 CT HEAD/BRAIN W/O DYE: CPT

## 2021-08-11 PROCEDURE — 80048 BASIC METABOLIC PNL TOTAL CA: CPT

## 2021-08-11 PROCEDURE — 83605 ASSAY OF LACTIC ACID: CPT

## 2021-08-11 PROCEDURE — 71045 X-RAY EXAM CHEST 1 VIEW: CPT

## 2021-08-11 PROCEDURE — 85610 PROTHROMBIN TIME: CPT

## 2021-08-11 PROCEDURE — 93005 ELECTROCARDIOGRAM TRACING: CPT

## 2021-08-11 PROCEDURE — 84100 ASSAY OF PHOSPHORUS: CPT

## 2021-08-11 PROCEDURE — 80053 COMPREHEN METABOLIC PANEL: CPT

## 2021-08-11 PROCEDURE — 85027 COMPLETE CBC AUTOMATED: CPT

## 2021-08-11 NOTE — NUR
PT ADMITTED TO  VIA  EMS ACCOMPANIED BY ER STAFF. PT ASSISTED X3 FROM GURNEY TO BED. 
PT AOX2 AND IS POOR HISTORIAN. PT REPORTS PAIN LEVEL RATING 7/10 ON PAIN SCALE IN RIGHT 
SHOULDER. PT GIVEN PRN IVP MORPHINE AS INDICATED. PT WAS ALSO GIVEN ICE PACK TO HELP RELIEVE 
BREAK THROUGH PAIN. PT USED BED PAN SHORTLY AFTER ADMISSION BUT WAS ONLY ABLE TO URINATE 
MINIMAL AMOUNT. THIS NURSE THEN USED BLADDER SCANNER AND FOUND PT HAD MORE THAN 994ML OF 
URINE STILL IN BLADDER. 16F NGO THEN PLACED AS INDICATED BY RETENTION. PT IS NOW RESTING 
COMFORTABLY IN BED W/ NO COMPLAINTS.

## 2021-08-11 NOTE — PHYS DOC
Past History


Past Medical History:  Arthritis


Past Surgical History:  Tonsillectomy


Alcohol Use:  None





General Adult


EDM:


Chief Complaint:  SHOULDER INJURY





HPI:


HPI:





Patient is a 85-year-old female brought in by EMS for right shoulder and chest 

pain and lower extremity weakness..  History mostly provided by EMS.  Patient 

was recently discharged for new onset A. fib RVR.  Per EMS report she was 

wheezing and satting 90% on room air, was given a DuoNeb and O2 sats improved to

97%.  Patient does not use home oxygen.  Patient is currently on Eliquis to be 

treated for a right-sided DVT.  Per EMS report the daughter had told him that 

her primary care physician, Dr. Nath, wanted her admitted for observation in

hopes to get her to a rehab facility.  





Per patient's daughter patient was ambulatory without a cane or walker prior to 

admission.  When she is admitted she is in the hospital for 2 days and cannot 

get a bed.  There was time for patient to be discharged she was unable to walk 

and had to be assisted to the wheelchair.  Family is currently having difficult 

time caring for patient.





Review of Systems:


Review of Systems:


All other systems within normal limits except for as noted in the HPI





Allergies:


Allergies:





Allergies








Coded Allergies Type Severity Reaction Last Updated Verified


 


  ibuprofen Allergy Severe Anaphylaxis 21 Yes


 


  Penicillins Allergy Unknown  21 Yes











Physical Exam:


PE:


Constitutional: Well developed, well nourished, no acute distress, non-toxic 

appearance. []


HENT: Normocephalic, atraumatic, bilateral external ears normal,  nose normal. 

[]


Eyes: PERRLA, conjunctiva normal, no discharge. [] 


Neck: No rigidity, supple, no stridor. [] 


Cardiovascular: Irregularly irregular rhythm, brisk cap refill []


Lungs & Thorax: Non labored symmetric respirations, no tachypnea or respiratory 

distress []


Abdomen: Soft, nondistended.


Skin: Warm, dry, no erythema, no rash. [] 


Back: Unremarkable


Extremities: No deformities, range of motion grossly intact, no lower extremity 

edema.  Range of motion of right shoulder intact, no deformity.  [] 


Neurologic: Alert and oriented X 3, no focal deficits noted. []


Psychologic: Affect normal, judgement normal, mood normal. []





EKG:


EKG:


Irregular regular rhythm, atrial fibrillation, heart rate 105 bpm, left axis 

deviation, no ST elevation or depression []





Radiology/Procedures:


Radiology/Procedures:


[facility] [address], [place] [address] [phone]


                                        


                                 IMAGING REPORT





                                     Signed





PATIENT: CRUZ VIVAS AACCOUNT: [id number]     MRN#: [id number]


: 1935           LOCATION: ER              AGE: 85


SEX: F                    EXAM DT: 21         ACCESSION#: [id number]


STATUS: REG ER            ORD. PHYSICIAN: GIOVANNI CRENSHAW MD


REASON: lower ext weakness


PROCEDURE: CT HEAD WO CONTRAST








INDICATION: Reason: lower ext weakness / Spl. Instructions:  / History: 





COMPARISON: July 15, 2021





TECHNIQUE: 





Axial CT images obtained through the head without intravenous contrast.





One or more of the following individualized dose reduction techniques were 

utilized for this examination:  1. Automated exposure control;  2. Adjustment of

 the mA and/or kV according to patient size;  3. Use of iterative reconstruction

 technique.





FINDINGS:





No intracranial hemorrhage.


No significant midline shift.


Ventricles and sulci are globally prominent.


Scattered foci of low attenuation within the white matter.


Calcific atherosclerosis.


Distal left internal carotid artery appears prominent in size compared to the 

right.





IMPRESSION:





*   No acute intracranial hemorrhage.


*  Scattered regions of low attenuation within the white matter.  Non-specific 

in nature but a common finding and frequently secondary to small vessel ischemic

 disease. If there is high concern for acute causes clinically MRI could better 

assess acuity.


*  Calcific atherosclerosis.


*  There is some fluid in the maxillary sinuses which could be from congestion 

or sinusitis.





Electronically signed by: Brandon Keith MD (2021 12:06 PM) DESKTOP-

[id number]














DICTATED AND SIGNED BY:     BRANDON KEITH MD


DATE:     21 1200





CC: MIGUEL NATH MD; GIOVANNI CRENSHAW MD ~MTH0 0


[][facility] [address], [place] [address] [phone]


                                        


                                 IMAGING REPORT





                                     Signed





PATIENT: CRUZ VIVAS AACCOUNT: [id number]     MRN#: [id number]


: 1935           LOCATION: ER              AGE: 85


SEX: F                    EXAM DT: 21         ACCESSION#: [id number]


STATUS: REG ER            ORD. PHYSICIAN: GIOVANNI CRENSHAW MD


REASON: wheezing, right shoulder pain


PROCEDURE: CHEST AP ONLY





EXAM: CHEST ONE VIEW.





HISTORY: Wheezing, right shoulder pain.





COMPARISON: CT 07/15/2021.





FINDINGS: A frontal view of the chest is obtained.





There are no confluent infiltrates. There is no pneumothorax or pleural 

effusion. A rounded density projecting over the right inferior hilum measures 

1.8 cm. There is no associated nodule on recent CTA and this is likely a normal 

vascular shadow. The heart is not enlarged. There are atherosclerotic 

calcifications of the aorta. Glenohumeral osteoarthritis appears moderate on the

 right greater than left.





IMPRESSION: 


1. No confluent infiltrates.





Electronically signed by: DESMOND Pichardo MD (2021 12:00 PM) [id number]














DICTATED AND SIGNED BY:     TESSIE PICHARDO MD


DATE:     21 1158





CC: MIGUEL NATH MD; GIOVANNI CRENSHAW MD ~MTH0 0





Heart Score:


C/O Chest Pain:  Yes


HEART Score for Chest Pain:  








HEART Score for Chest Pain Response (Comments) Value


 


History Slighlty/Non-Suspicious 0


 


ECG Nonspecific Repolarizatio 1


 


Age > 65 2


 


Risk Factors                            1 or 2 Risk Factors 1


 


Troponin < Normal Limit 0


 


Total  4








Risk Factors:


Risk Factors:  DM, Current or recent (<one month) smoker, HTN, HLP, family 

history of CAD, obesity.


Risk Scores:


Score 0 - 3:  2.5% MACE over next 6 weeks - Discharge Home


Score 4 - 6:  20.3% MACE over next 6 weeks - Admit for Clinical Observation


Score 7 - 10:  72.7% MACE over next 6 weeks - Early Invasive Strategies





Course & Med Decision Making:


Course & Med Decision Making


Pertinent Labs and Imaging studies reviewed. (See chart for details)


Discussed with patient's primary care provider, he would like her admitted for 

further observation for her increase in BNP from 3 days ago.  We will transfer 

to [place] due to no bed availability at this hospital.


[]





Dragon Disclaimer:


Dragon Disclaimer:


This electronic medical record was generated, in whole or in part, using a voice

 recognition dictation system.





Departure


Departure:


Impression:  


   Primary Impression:  


   Heart failure


   Additional Impression:  


   Weakness


Disposition:  [facility]


Condition:  STABLE


Referrals:  


MIGUEL NATH MD (PCP)











GIOVANNI CRENSHAW MD            Aug 11, 2021 11:19

## 2021-08-11 NOTE — RAD
INDICATION: Reason: lower ext weakness / Spl. Instructions:  / History: 



COMPARISON: July 15, 2021



TECHNIQUE: 



Axial CT images obtained through the head without intravenous contrast.



One or more of the following individualized dose reduction techniques were utilized for this examinat
ion:  1. Automated exposure control;  2. Adjustment of the mA and/or kV according to patient size;  3
. Use of iterative reconstruction technique.



FINDINGS:



No intracranial hemorrhage.

No significant midline shift.

Ventricles and sulci are globally prominent.

Scattered foci of low attenuation within the white matter.

Calcific atherosclerosis.

Distal left internal carotid artery appears prominent in size compared to the right.



IMPRESSION:



*   No acute intracranial hemorrhage.

*  Scattered regions of low attenuation within the white matter.  Non-specific in nature but a common
 finding and frequently secondary to small vessel ischemic disease. If there is high concern for acut
e causes clinically MRI could better assess acuity.

*  Calcific atherosclerosis.

*  There is some fluid in the maxillary sinuses which could be from congestion or sinusitis.



Electronically signed by: Dm Garcia MD (8/11/2021 12:06 PM) [id number]

## 2021-08-11 NOTE — RAD
EXAM: CHEST ONE VIEW.



HISTORY: Wheezing, right shoulder pain.



COMPARISON: CT 07/15/2021.



FINDINGS: A frontal view of the chest is obtained.



There are no confluent infiltrates. There is no pneumothorax or pleural effusion. A rounded density p
rojecting over the right inferior hilum measures 1.8 cm. There is no associated nodule on recent CTA 
and this is likely a normal vascular shadow. The heart is not enlarged. There are atherosclerotic stevie
cifications of the aorta. Glenohumeral osteoarthritis appears moderate on the right greater than left
.



IMPRESSION: 

1. No confluent infiltrates.



Electronically signed by: DESMOND Pichardo MD (8/11/2021 12:00 PM) [id number]

## 2021-08-11 NOTE — EKG
[facility] [address], [place] [address]

Test Date:    2021               Test Time:    11:11:30

Pat Name:     CRUZ VIVAS          Department:   

Patient ID:   [id number]           Room:          

Gender:       F                        Technician:   ABELARDO

:          1935               Requested By: GIOVANNI CRENSHAW

Order Number: [id number]            Reading MD:     

                                 Measurements

Intervals                              Axis          

Rate:         105                      P:            

NE:                                    QRS:          -25

QRSD:         86                       T:            16

QT:           346                                    

QTc:          461                                    

                           Interpretive Statements

IRREGULAR RHYTHM, NO P-WAVE FOUND

VENTRICULAR PREMATURE COMPLEX(ES)

LEFTWARD AXIS

ABNORMAL ECG

RI6.02

No previous ECG available for comparison

## 2021-08-12 VITALS — SYSTOLIC BLOOD PRESSURE: 108 MMHG | DIASTOLIC BLOOD PRESSURE: 61 MMHG

## 2021-08-12 VITALS — SYSTOLIC BLOOD PRESSURE: 152 MMHG | DIASTOLIC BLOOD PRESSURE: 69 MMHG

## 2021-08-12 VITALS — SYSTOLIC BLOOD PRESSURE: 109 MMHG | DIASTOLIC BLOOD PRESSURE: 66 MMHG

## 2021-08-12 VITALS — SYSTOLIC BLOOD PRESSURE: 125 MMHG | DIASTOLIC BLOOD PRESSURE: 64 MMHG

## 2021-08-12 RX ADMIN — INSULIN LISPRO SCH UNITS: 100 INJECTION, SOLUTION INTRAVENOUS; SUBCUTANEOUS at 12:15

## 2021-08-12 RX ADMIN — LOSARTAN POTASSIUM SCH MG: 25 TABLET, FILM COATED ORAL at 08:11

## 2021-08-12 RX ADMIN — APIXABAN SCH MG: 5 TABLET, FILM COATED ORAL at 20:35

## 2021-08-12 RX ADMIN — METHYLPREDNISOLONE SODIUM SUCCINATE SCH MG: 40 INJECTION, POWDER, FOR SOLUTION INTRAMUSCULAR; INTRAVENOUS at 20:35

## 2021-08-12 RX ADMIN — INSULIN LISPRO SCH UNITS: 100 INJECTION, SOLUTION INTRAVENOUS; SUBCUTANEOUS at 17:39

## 2021-08-12 RX ADMIN — ALBUTEROL SULFATE PRN MG: 108 AEROSOL, METERED RESPIRATORY (INHALATION) at 11:56

## 2021-08-12 RX ADMIN — IPRATROPIUM BROMIDE AND ALBUTEROL SULFATE SCH ML: .5; 3 SOLUTION RESPIRATORY (INHALATION) at 20:35

## 2021-08-12 RX ADMIN — MONTELUKAST SODIUM SCH MG: 10 TABLET ORAL at 20:35

## 2021-08-12 RX ADMIN — IPRATROPIUM BROMIDE AND ALBUTEROL SULFATE SCH ML: .5; 3 SOLUTION RESPIRATORY (INHALATION) at 17:22

## 2021-08-12 RX ADMIN — LATANOPROST SCH DROP: 50 SOLUTION OPHTHALMIC at 17:39

## 2021-08-12 RX ADMIN — INSULIN GLARGINE SCH UNIT: 100 INJECTION, SOLUTION SUBCUTANEOUS at 08:14

## 2021-08-12 RX ADMIN — DORZOLAMIDE HYDROCHLORIDE AND TIMOLOL MALEATE SCH DROP: 20; 5 SOLUTION/ DROPS OPHTHALMIC at 09:46

## 2021-08-12 RX ADMIN — ALBUTEROL SULFATE PRN MG: 108 AEROSOL, METERED RESPIRATORY (INHALATION) at 06:21

## 2021-08-12 RX ADMIN — TRIAMTERENE AND HYDROCHLOROTHIAZIDE SCH TAB: 37.5; 25 TABLET ORAL at 08:12

## 2021-08-12 RX ADMIN — LATANOPROST SCH DROP: 50 SOLUTION OPHTHALMIC at 08:14

## 2021-08-12 RX ADMIN — INSULIN LISPRO SCH UNITS: 100 INJECTION, SOLUTION INTRAVENOUS; SUBCUTANEOUS at 08:13

## 2021-08-12 RX ADMIN — APIXABAN SCH MG: 5 TABLET, FILM COATED ORAL at 08:11

## 2021-08-12 RX ADMIN — ATORVASTATIN CALCIUM SCH MG: 10 TABLET, FILM COATED ORAL at 20:35

## 2021-08-12 RX ADMIN — IPRATROPIUM BROMIDE AND ALBUTEROL SULFATE SCH ML: .5; 3 SOLUTION RESPIRATORY (INHALATION) at 16:00

## 2021-08-12 NOTE — PDOC2
CARDIAC CONSULT


DATE OF CONSULT


DOS:


DATE: 8/12/21 


TIME: 08:24





REASON FOR CONSULT


Reason for Consult


CHF





REFERRING PHYSICIAN


Referring Physician


Dr. Vega





SOURCE


Source:  Chart review, Patient





HPI


History of Present Illness


This is a 86 yo female who presented secondary to weakness. Patient was seen 

earlier this week due to chest pressure and shortness of breath. Was initially 

thought to be in AFIB with RVR, but review of EKG and tele showed ST with 

frequent PAC's vs possible MAT. Has had thorough cardiac evaluation recently 

through [facility], Dr. Price. Daughter reports that heart is strong. Event 

monitor did not show any AFIB or flutter, but was told she had some irregular 

beats. Is on Eliquis therapy for recent DVT.


Daughter reports she was extremely weak upon discharge from the hospital this 

week. Elderly  was unable to care for her at home. He had reached out to 

primary care provided to get her to rehab facility but he wanted her evaluated 

at the ED prior to going to rehab.  Family requesting [facility] upon discharge. 

Daughter also reports that patient uses albuterol inhaler very frequently at 

home due to asthma. 


Patient presented denies any chest pain, dizziness, diaphoresis, palpitations, 

or nausea/vomiting. Family reports compliance with medications.





PAST MEDICAL HISTORY


Past Medical History


Cardiovascular:  hyperipidemia


Pulmonary:  Asthma


CENTRAL NERVOUS SYSTEM:  Dementia


Heme/Onc:  Other (DVT)


Musculoskeletal:  Osteoarthritis


Renal/:  Chronic renal insuff


Endocrine:  Diabetes





PAST SURGICAL HISTORY


Past Surgical History:  Hysterectomy





FAMILY HISTORY


Family History:  Family History Unknown





SOCIAL HISTORY


Social History


Smoke:  No


ALCOHOL:  none


Drugs:  None


Lives:  with Family





CURRENT MEDICATIONS


Current Medications





Current Medications


Morphine Sulfate (Morphine 2mg Syringe) 2 mg PRN Q2HR  PRN IVP PAIN Last 

administered on 8/11/21at 20:30;  Start 8/11/21 at 14:30;  Stop 8/12/21 at 14:29


Acetaminophen (Tylenol) 650 mg PRN Q4HRS  PRN PO FEVER > 100.3'F;  Start 8/11/21

at 14:30;  Stop 8/12/21 at 14:29


Albuterol Sulfate (Ventolin) 2.5 mg PRN Q4HRS  PRN IH wheezing Last administered

on 8/12/21at 06:21;  Start 8/11/21 at 22:00


Apixaban (Eliquis) 5 mg BID PO  Last administered on 8/12/21at 08:11;  Start 8/1 2/21 at 09:00


Diltiazem HCl (Cardizem 24hr Cd) 180 mg DAILY08 PO  Last administered on 

8/12/21at 04:40;  Start 8/12/21 at 05:00


Dorzolamide/ Timolol (Cosopt) 1 drop DAILY07 OD ;  Start 8/12/21 at 07:00


Latanoprost (Xalatan) 1 drop BID76 OU  Last administered on 8/12/21at 08:14;  

Start 8/12/21 at 07:00


Losartan Potassium (Cozaar) 12.5 mg DAILY PO  Last administered on 8/12/21at 

08:11;  Start 8/12/21 at 09:00


Triamterene/HCTZ (Maxzide 37.5/ 25mg) 1 tab DAILY PO  Last administered on 

8/12/21at 08:12;  Start 8/12/21 at 09:00


Non-Formulary Medication (Albuterol Sulfate (Albuterol Sulfate Neb Soln)) 1 vial

Q6HRS NEB ;  Start 8/12/21 at 00:00;  Status UNV


Insulin Glargine (Lantus Syringe) 35 unit DAILY07 SQ  Last administered on 

8/12/21at 08:14;  Start 8/12/21 at 07:00


Atorvastatin Calcium (Lipitor) 10 mg QHS PO ;  Start 8/12/21 at 21:00


Insulin Human Lispro (HumaLOG) 0-9 UNITS TIDWMEALS SQ  Last administered on 

8/12/21at 08:13;  Start 8/12/21 at 08:00


Dextrose (Dextrose 50%-Water Syringe) 12.5 gm PRN Q15MIN  PRN IV SEE COMMENTS;  

Start 8/11/21 at 22:15


Furosemide (Lasix) 40 mg 1X  ONCE IVP  Last administered on 8/12/21at 06:16;  

Start 8/12/21 at 06:30;  Stop 8/12/21 at 06:31;  Status DC





Active Scripts


Active


Reported


Eliquis (Apixaban) 5 Mg Tablet 5 Mg PO BID


Albuterol Sulfate Neb Soln (Albuterol Sulfate) 1.25 Mg/3 Ml Vial.neb 1 Vial NEB 

Q6HRS


Proair Hfa Inhaler (Albuterol Sulfate) 8.5 Gm Hfa.aer.ad 2 Puff IH PRN Q4-6HRS 

PRN 21 Days


Dorzolamide-Timolol Eye Drops (Dorzolamide Hcl/Timolol Maleat) 10 Ml Drops 1 

Drop OD DAILY07


Xalatan (Latanoprost) 2.5 Ml Drops 1 Drop OU BID76


Triamterene-Hctz 37.5-25 Mg Tb (Triamterene/Hydrochlorothiazid) 1 Each Tablet 1 

Tab PO DAILY


Pravastatin Sodium 40 Mg Tablet 1 Tab PO QHS


Losartan Potassium ** (Losartan Potassium) 25 Mg Tablet 7.5 Mg PO DAILY


Cartia Xt (Diltiazem Hcl) 180 Mg Cap.er.24h 180 Mg PO DAILY08


Basaglar Kwikpen U-100 (Insulin Glargine,Hum.rec.anlog) 100 Unit/1 Ml Insuln.pen

35 Unit SQ DAILY07





ALLERGIES


Allergies:  


Coded Allergies:  


     ibuprofen (Verified  Allergy, Severe, Anaphylaxis, 8/11/21)


     Penicillins (Verified  Allergy, Intermediate, 8/12/21)





ROS


Review of Systems


14 point ROS conducted with pertinent positives noted above in HPI although 

limited due to confusion.





PHYSICAL EXAM


Physical Exam


General:  Alert, Cooperative, No acute distress, Other (oriented to person only 

)


Lungs:  Other (diminished bases, fine expiratory wheezes)


Heart:  Regular rate (MAT)


Abdomen:  Soft


Extremities:  No edema, Normal pulses


Skin:  No rashes, No breakdown


Neuro:  Normal speech, Sensation intact


Psych/Mental Status:  Mood NL, Other (confused)


MUSCULOSKELETAL:  Osteoarthritic changes both hands





VITALS


Vital Signs





Vital Signs








  Date Time  Temp Pulse Resp B/P (MAP) Pulse Ox O2 Delivery O2 Flow Rate FiO2


 


8/12/21 08:11  121  152/69    


 


8/12/21 06:15 97.6  24  94 Nasal Cannula 2.0 











LABS


LABS





Laboratory Tests








Test


 8/11/21


11:30 8/11/21


12:05 8/11/21


13:00 8/12/21


06:15


 


White Blood Count


 13.9 x10^3/uL


(4.0-11.0) 


 


 





 


Red Blood Count


 4.19 x10^6/uL


(3.50-5.40) 


 


 





 


Hemoglobin


 11.9 g/dL


(12.0-15.5) 


 


 





 


Hematocrit


 36.6 %


(36.0-47.0) 


 


 





 


Mean Corpuscular Volume 87 fL ()    


 


Mean Corpuscular Hemoglobin 28 pg (25-35)    


 


Mean Corpuscular Hemoglobin


Concent 32 g/dL


(31-37) 


 


 





 


Red Cell Distribution Width


 14.9 %


(11.5-14.5) 


 


 





 


Platelet Count


 252 x10^3/uL


(140-400) 


 


 





 


Neutrophils (%) (Auto) 74 % (31-73)    


 


Lymphocytes (%) (Auto) 15 % (24-48)    


 


Monocytes (%) (Auto) 11 % (0-9)    


 


Eosinophils (%) (Auto) 0 % (0-3)    


 


Basophils (%) (Auto) 0 % (0-3)    


 


Neutrophils # (Auto)


 10.3 x10^3uL


(1.8-7.7) 


 


 





 


Lymphocytes # (Auto)


 2.0 x10^3/uL


(1.0-4.8) 


 


 





 


Monocytes # (Auto)


 1.5 x10^3/uL


(0.0-1.1) 


 


 





 


Eosinophils # (Auto)


 0.0 x10^3/uL


(0.0-0.7) 


 


 





 


Basophils # (Auto)


 0.0 x10^3/uL


(0.0-0.2) 


 


 





 


Sodium Level


 133 mmol/L


(136-145) 


 


 





 


Potassium Level


 4.3 mmol/L


(3.5-5.1) 


 


 





 


Chloride Level


 97 mmol/L


() 


 


 





 


Carbon Dioxide Level


 27 mmol/L


(21-32) 


 


 





 


Anion Gap 9 (6-14)    


 


Blood Urea Nitrogen


 39 mg/dL


(7-20) 


 


 





 


Creatinine


 1.6 mg/dL


(0.6-1.0) 


 


 





 


Estimated GFR


(Cockcroft-Gault) 30.6 


 


 


 





 


BUN/Creatinine Ratio 24 (6-20)    


 


Glucose Level


 341 mg/dL


(70-99) 


 


 





 


Lactic Acid Level


 1.4 mmol/L


(0.4-2.0) 


 


 





 


Calcium Level


 9.1 mg/dL


(8.5-10.1) 


 


 





 


Phosphorus Level


 3.4 mg/dL


(2.6-4.7) 


 


 





 


Magnesium Level


 2.2 mg/dL


(1.8-2.4) 


 


 





 


Total Bilirubin


 0.3 mg/dL


(0.2-1.0) 


 


 





 


Aspartate Amino Transf


(AST/SGOT) 17 U/L (15-37) 


 


 


 





 


Alanine Aminotransferase


(ALT/SGPT) 13 U/L (14-59) 


 


 


 





 


Alkaline Phosphatase


 84 U/L


() 


 


 





 


Troponin I Quantitative


 < 0.017 ng/mL


(0-0.055) 


 


 < 0.017 ng/mL


(0-0.055)


 


NT-Pro-B-Type Natriuretic


Peptide 2999 pg/mL


(0-449) 


 


 





 


Total Protein


 6.6 g/dL


(6.4-8.2) 


 


 





 


Albumin


 2.6 g/dL


(3.4-5.0) 


 


 





 


Albumin/Globulin Ratio 0.7 (1.0-1.7)    


 


Prothrombin Time


 


 11.0 SEC


(9.4-11.4) 


 





 


Prothromb Time International


Ratio 


 1.1 (0.9-1.1) 


 


 





 


Urine Collection Type   U cath  


 


Urine Color   Yellow  


 


Urine Clarity   Hazy  


 


Urine pH   5.0  


 


Urine Specific Gravity   1.025  


 


Urine Protein


 


 


 30 mg/dl


(NEG-TRACE) 





 


Urine Glucose (UA)


 


 


 100 mg/dL


(NEG) 





 


Urine Ketones (Stick)


 


 


 Neg mg/dL


(NEG) 





 


Urine Blood   Trace (NEG)  


 


Urine Nitrite   Neg (NEG)  


 


Urine Bilirubin   Neg (NEG)  


 


Urine Urobilinogen Dipstick


 


 


 0.2 mg/dL (0.2


mg/dL) 





 


Urine Leukocyte Esterase   Neg (NEG)  


 


Urine RBC   1-2 /HPF (0-2)  


 


Urine WBC   1-4 /HPF (0-4)  


 


Urine Squamous Epithelial


Cells 


 


 None /LPF 


 





 


Urine Amorphous Sediment   Present /HPF  


 


Urine Bacteria   0 /HPF (0-FEW)  


 


Urine Granular Casts   Few /HPF  


 


Urine Mucus   Mod /LPF  


 


Test


 8/12/21


07:26 


 


 





 


Glucose (Fingerstick)


 282 mg/dL


(70-99) 


 


 














ASSESSMENT/PLAN


Assessment/Plan


1.  Weakness


2.  Tachyarrhythmia; Initially thought to be in AFIB, but review of EKG and tele

shows MAT. No AFIB or flutter noted


3.  Diastolic CHF; NT Pro BNP elevated, but CXR without pulmonary edema and exam

not consistent with fluid overload 


4.  Hypertension; controlled 


5.  Hyperlipidmia; statin 


6.  Diabetes, II


7.  CKD


8.  Dementia 


9.  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 





Recommendations





Obtain recent event monitor and echo from Dr. Price at [facility].


Resume oral Cardizem; will increase for better rate control.


No BB with asthma, wheezing


Eliquis for h/o DVT


Supportive care


Family requesting [facility] rehab upon discharge


Supportive care











NELLY ACUÑA           Aug 12, 2021 08:27

## 2021-08-12 NOTE — HP
ADMIT DATE: 2021

HISTORY OF PRESENT ILLNESS:  The patient is an 85-year-old  female 

patient who was brought by EMS for right shoulder and chest pain and lower 

extremity weakness.  She was recently discharged after a brief admission for new

onset of atrial fibrillation with RVR where EMS reports she was wheezing and 

satting at 90% on room air.  She was given a DuoNeb and oxygen saturation 

improved to 97%.  The patient does not use any home oxygen.  She was on Eliquis 

to treat her right-sided DVT.  Her daughter had told basically her primary care 

physician sent her to the Emergency Room to be admitted for observation and 

eventually to get to a rehab facility.  As per daughter's account, the patient 

was ambulatory without a cane or walker prior to admission.  When she is 

admitted, she is in the hospital for 2 days and cannot get out of bed.  By the 

time the patient was discharged, she was unable to walk and had to be assisted 

to the wheelchair and the family was basically having difficult time caring for 

her at home and therefore, she was admitted with a plan for her to be admitted 

to [facility].  The patient herself complained of shortness of 

breath, but denied any chest pain.  She was evaluated in the Emergency Room, has

had lab work done which showed that she has leukocytosis with a white cell count

of 13,900.  Her chemistry showed that she has impaired kidney function and 

elevated beta-natriuretic peptide and hypoalbuminemia.  Her prothrombin time is 

normal.  Her urinalysis was essentially unremarkable.  We will resume all her 

medications and consult the Cardiology team as well as physical and occupational

therapy and  and hopefully discharge her to [facility] if she qualifies for that.



PAST MEDICAL HISTORY:  Significant for pure hypercholesterolemia, 

hypercoagulability state, essential hypertension, paroxysmal atrial 

fibrillation, wandering atrial pacemaker, thoracic aortic aneurysm without 

rupture, chronic obstructive pulmonary disease, dyspnea on exertion and chronic 

kidney disease due to type 2 diabetes mellitus.



PAST SURGICAL HISTORY:  Significant for cataract surgery and hysterectomy.



ALLERGIES:  SHE IS ALLERGIC TO PENICILLIN and IBUPROFEN.



MEDICATIONS:  She is currently on following medications:  She is on albuterol 

sulfate 2 puffs every 4-6 hours, albuterol sulfate 1.25 mg in 3 mL by nebulizer 

every 6 hours, apixaban 5 mg twice a day, pravastatin sodium 40 mg at bedtime, 

diltiazem 180 mg daily, losartan potassium 7.5 mg daily, 

triamterene/hydrochlorothiazide 37.5/25 one tablet once a day.  She is on 

dorzolamide/timolol one drop to both eyes daily, latanoprost one drop to both 

eyes twice a day.  She is on Lantus 35 units at bedtime.



FAMILY HISTORY:  Significant for the fact the father has heart disease and  

at age of 72.  Mother has cerebrovascular accident and  at age of 77.  She 

has a brother with heart disease and alcohol abuse.



SOCIAL HISTORY:  She is  and lives with her .  She has 2 sons from

previous marriage and 1 daughter.  She never smoked, does not drink alcohol or 

do any recreational drugs.  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



REVIEW OF SYSTEMS:  As per history of present illness.



PHYSICAL EXAMINATION:

GENERAL:  On arrival to the Emergency Room, the patient was somewhat pale, but 

not jaundiced or cyanosed, no lymphadenopathy, no thyromegaly, no jugular venous

distention.  No limb edema.

VITAL SIGNS:  Her heart rate was 93, blood pressure was 116/62, temperature was 

98.4, respiratory rate was 25 and oxygen saturation was 95% on room air.

HEAD, EYES, EARS, NOSE, AND THROAT:  Normocephalic, atraumatic.

NECK:  Supple.

HEART:  Showed normal first and second heart sounds, no gallop or murmur.

CHEST:  Shows central trachea, equally reduced expansion, reduced air entry, 

vesicular breath sounds.  I really could not appreciate any crepitation or 

rhonchi.

ABDOMEN:  Distended, soft, nontender.

NEUROLOGIC:  She is awake, alert, but confused.  All her cranial nerves are 

grossly intact.  She moves extremities without difficulty; however, she is very 

weak and unsteady on her feet and very high fall risk.



LABORATORY DATA:  Her lab work on arrival showed a white cell count of 13,900, 

hemoglobin 11.9, hematocrit 36.6, MCV 87 and platelet count 252,000 with a 

manual differential showed 74% polymorphs, 15% lymphocytes, 11% monocytes.  Her 

chemistry showed serum sodium 133, potassium 4.3, chloride 97, bicarbonate 27, 

anion gap of 9, BUN 39, creatinine 1.6, estimated GFR was 30 mL per minute.  Her

glucose was 341, calcium was 9.1, magnesium 2.2.  Total bilirubin, AST, ALT, 

alkaline phosphatase were normal.  Beta-natriuretic peptide was 2999.  Total 

protein 6.6, albumin was 2.6.  Her prothrombin time, INR were 11 and 1.1.  

Urinalysis was essentially unremarkable.



Her chest x-ray showed there is no confluent infiltrate or pneumothorax or 

pleural effusion, rounded density projecting over the right inferior hilum 

measures 1.8 cm.  There is no associated nodule on recent CT angio and this is 

likely a normal vascular shadow.  The heart is not enlarged.  There are 

atherosclerotic calcification of the aorta.  Glenohumeral osteoarthritis appears

moderate on the right greater than left.



ASSESSMENT AND PLAN:

1.  The patient was admitted with weakness.

2.  Acute-on-chronic diastolic congestive heart failure.

3.  Tachyarrhythmias, initially thought to be atrial fibrillation, but the tele 

showed that she has multi-atrial tachycardia.

4.  Hypertension.

5.  Hyperlipidemia.

6.  Type 2 diabetes mellitus.

7.  Chronic kidney disease.

8.  Dementia.



The patient has a history of deep vein thrombosis for which she is currently on 

Eliquis.  We did consult the cardiologist and her diltiazem was resumed.  She 

was also given one dose of IV Lasix.  We will continue with apixaban and all 

other medications and consult the physical and occupational therapy for 

evaluation and treatment.







AMM/NIT

DR: AMM/nts   DD: 2021 14:49

DT: 2021 15:47   TID: [id number]

## 2021-08-13 VITALS — DIASTOLIC BLOOD PRESSURE: 56 MMHG | SYSTOLIC BLOOD PRESSURE: 112 MMHG

## 2021-08-13 VITALS — SYSTOLIC BLOOD PRESSURE: 104 MMHG | DIASTOLIC BLOOD PRESSURE: 61 MMHG

## 2021-08-13 VITALS — DIASTOLIC BLOOD PRESSURE: 62 MMHG | SYSTOLIC BLOOD PRESSURE: 117 MMHG

## 2021-08-13 VITALS — DIASTOLIC BLOOD PRESSURE: 75 MMHG | SYSTOLIC BLOOD PRESSURE: 145 MMHG

## 2021-08-13 VITALS — SYSTOLIC BLOOD PRESSURE: 114 MMHG | DIASTOLIC BLOOD PRESSURE: 68 MMHG

## 2021-08-13 LAB
ALBUMIN SERPL-MCNC: 2.1 G/DL (ref 3.4–5)
ALBUMIN/GLOB SERPL: 0.4 {RATIO} (ref 1–1.7)
ALP SERPL-CCNC: 79 U/L (ref 46–116)
ALT SERPL-CCNC: 16 U/L (ref 14–59)
ANION GAP SERPL CALC-SCNC: 10 MMOL/L (ref 6–14)
AST SERPL-CCNC: 17 U/L (ref 15–37)
BILIRUB SERPL-MCNC: 0.4 MG/DL (ref 0.2–1)
BUN/CREAT SERPL: 25 (ref 6–20)
CA-I SERPL ISE-MCNC: 42 MG/DL (ref 7–20)
CALCIUM SERPL-MCNC: 9 MG/DL (ref 8.5–10.1)
CHLORIDE SERPL-SCNC: 93 MMOL/L (ref 98–107)
CO2 SERPL-SCNC: 28 MMOL/L (ref 21–32)
CREAT SERPL-MCNC: 1.7 MG/DL (ref 0.6–1)
ERYTHROCYTE [DISTWIDTH] IN BLOOD BY AUTOMATED COUNT: 14.9 % (ref 11.5–14.5)
GFR SERPLBLD BASED ON 1.73 SQ M-ARVRAT: 28.6 ML/MIN
GLOBULIN SER-MCNC: 4.9 G/DL (ref 2.2–3.8)
GLUCOSE SERPL-MCNC: 457 MG/DL (ref 70–99)
HCT VFR BLD CALC: 36.2 % (ref 36–47)
HGB BLD-MCNC: 11.7 G/DL (ref 12–15.5)
MCH RBC QN AUTO: 28 PG (ref 25–35)
MCHC RBC AUTO-ENTMCNC: 32 G/DL (ref 31–37)
MCV RBC AUTO: 87 FL (ref 79–100)
PLATELET # BLD AUTO: 270 X10^3/UL (ref 140–400)
POTASSIUM SERPL-SCNC: 4.5 MMOL/L (ref 3.5–5.1)
PROT SERPL-MCNC: 7 G/DL (ref 6.4–8.2)
RBC # BLD AUTO: 4.15 X10^6/UL (ref 3.5–5.4)
SODIUM SERPL-SCNC: 131 MMOL/L (ref 136–145)
WBC # BLD AUTO: 9.6 X10^3/UL (ref 4–11)

## 2021-08-13 RX ADMIN — DORZOLAMIDE HYDROCHLORIDE AND TIMOLOL MALEATE SCH DROP: 20; 5 SOLUTION/ DROPS OPHTHALMIC at 09:17

## 2021-08-13 RX ADMIN — INSULIN LISPRO SCH UNITS: 100 INJECTION, SOLUTION INTRAVENOUS; SUBCUTANEOUS at 12:27

## 2021-08-13 RX ADMIN — INSULIN GLARGINE SCH UNIT: 100 INJECTION, SOLUTION SUBCUTANEOUS at 21:22

## 2021-08-13 RX ADMIN — INSULIN LISPRO SCH UNITS: 100 INJECTION, SOLUTION INTRAVENOUS; SUBCUTANEOUS at 13:15

## 2021-08-13 RX ADMIN — INSULIN LISPRO SCH UNITS: 100 INJECTION, SOLUTION INTRAVENOUS; SUBCUTANEOUS at 12:28

## 2021-08-13 RX ADMIN — LATANOPROST SCH DROP: 50 SOLUTION OPHTHALMIC at 09:17

## 2021-08-13 RX ADMIN — METHYLPREDNISOLONE SODIUM SUCCINATE SCH MG: 40 INJECTION, POWDER, FOR SOLUTION INTRAMUSCULAR; INTRAVENOUS at 09:16

## 2021-08-13 RX ADMIN — IPRATROPIUM BROMIDE AND ALBUTEROL SULFATE SCH ML: .5; 3 SOLUTION RESPIRATORY (INHALATION) at 05:24

## 2021-08-13 RX ADMIN — APIXABAN SCH MG: 5 TABLET, FILM COATED ORAL at 21:23

## 2021-08-13 RX ADMIN — IPRATROPIUM BROMIDE AND ALBUTEROL SULFATE SCH ML: .5; 3 SOLUTION RESPIRATORY (INHALATION) at 20:23

## 2021-08-13 RX ADMIN — ATORVASTATIN CALCIUM SCH MG: 10 TABLET, FILM COATED ORAL at 21:22

## 2021-08-13 RX ADMIN — INSULIN LISPRO SCH UNITS: 100 INJECTION, SOLUTION INTRAVENOUS; SUBCUTANEOUS at 17:14

## 2021-08-13 RX ADMIN — LATANOPROST SCH DROP: 50 SOLUTION OPHTHALMIC at 17:13

## 2021-08-13 RX ADMIN — INSULIN LISPRO SCH UNITS: 100 INJECTION, SOLUTION INTRAVENOUS; SUBCUTANEOUS at 17:15

## 2021-08-13 RX ADMIN — IPRATROPIUM BROMIDE AND ALBUTEROL SULFATE SCH ML: .5; 3 SOLUTION RESPIRATORY (INHALATION) at 09:00

## 2021-08-13 RX ADMIN — LOSARTAN POTASSIUM SCH MG: 25 TABLET, FILM COATED ORAL at 09:18

## 2021-08-13 RX ADMIN — METHYLPREDNISOLONE SODIUM SUCCINATE SCH MG: 40 INJECTION, POWDER, FOR SOLUTION INTRAMUSCULAR; INTRAVENOUS at 21:23

## 2021-08-13 RX ADMIN — INSULIN LISPRO SCH UNITS: 100 INJECTION, SOLUTION INTRAVENOUS; SUBCUTANEOUS at 09:38

## 2021-08-13 RX ADMIN — APIXABAN SCH MG: 5 TABLET, FILM COATED ORAL at 09:14

## 2021-08-13 RX ADMIN — INSULIN LISPRO SCH UNITS: 100 INJECTION, SOLUTION INTRAVENOUS; SUBCUTANEOUS at 09:39

## 2021-08-13 RX ADMIN — MONTELUKAST SODIUM SCH MG: 10 TABLET ORAL at 21:22

## 2021-08-13 RX ADMIN — INSULIN GLARGINE SCH UNIT: 100 INJECTION, SOLUTION SUBCUTANEOUS at 09:38

## 2021-08-13 RX ADMIN — IPRATROPIUM BROMIDE AND ALBUTEROL SULFATE SCH ML: .5; 3 SOLUTION RESPIRATORY (INHALATION) at 15:06

## 2021-08-13 RX ADMIN — TRIAMTERENE AND HYDROCHLOROTHIAZIDE SCH TAB: 37.5; 25 TABLET ORAL at 09:00

## 2021-08-14 VITALS — SYSTOLIC BLOOD PRESSURE: 118 MMHG | DIASTOLIC BLOOD PRESSURE: 60 MMHG

## 2021-08-14 VITALS — DIASTOLIC BLOOD PRESSURE: 59 MMHG | SYSTOLIC BLOOD PRESSURE: 113 MMHG

## 2021-08-14 VITALS — DIASTOLIC BLOOD PRESSURE: 54 MMHG | SYSTOLIC BLOOD PRESSURE: 100 MMHG

## 2021-08-14 VITALS — DIASTOLIC BLOOD PRESSURE: 58 MMHG | SYSTOLIC BLOOD PRESSURE: 113 MMHG

## 2021-08-14 LAB
ALBUMIN SERPL-MCNC: 2.1 G/DL (ref 3.4–5)
ALBUMIN/GLOB SERPL: 0.4 {RATIO} (ref 1–1.7)
ALP SERPL-CCNC: 74 U/L (ref 46–116)
ALT SERPL-CCNC: 21 U/L (ref 14–59)
ANION GAP SERPL CALC-SCNC: 9 MMOL/L (ref 6–14)
AST SERPL-CCNC: 24 U/L (ref 15–37)
BASOPHILS # BLD AUTO: 0 X10^3/UL (ref 0–0.2)
BASOPHILS NFR BLD: 0 % (ref 0–3)
BILIRUB SERPL-MCNC: 0.2 MG/DL (ref 0.2–1)
BUN/CREAT SERPL: 36 (ref 6–20)
CA-I SERPL ISE-MCNC: 68 MG/DL (ref 7–20)
CALCIUM SERPL-MCNC: 8.8 MG/DL (ref 8.5–10.1)
CHLORIDE SERPL-SCNC: 95 MMOL/L (ref 98–107)
CO2 SERPL-SCNC: 28 MMOL/L (ref 21–32)
CREAT SERPL-MCNC: 1.9 MG/DL (ref 0.6–1)
EOSINOPHIL NFR BLD: 0 % (ref 0–3)
EOSINOPHIL NFR BLD: 0 X10^3/UL (ref 0–0.7)
ERYTHROCYTE [DISTWIDTH] IN BLOOD BY AUTOMATED COUNT: 15 % (ref 11.5–14.5)
GFR SERPLBLD BASED ON 1.73 SQ M-ARVRAT: 25.1 ML/MIN
GLOBULIN SER-MCNC: 4.7 G/DL (ref 2.2–3.8)
GLUCOSE SERPL-MCNC: 403 MG/DL (ref 70–99)
HCT VFR BLD CALC: 34.8 % (ref 36–47)
HGB BLD-MCNC: 11.4 G/DL (ref 12–15.5)
LYMPHOCYTES # BLD: 0.9 X10^3/UL (ref 1–4.8)
LYMPHOCYTES NFR BLD AUTO: 8 % (ref 24–48)
MCH RBC QN AUTO: 29 PG (ref 25–35)
MCHC RBC AUTO-ENTMCNC: 33 G/DL (ref 31–37)
MCV RBC AUTO: 87 FL (ref 79–100)
MONO #: 0.3 X10^3/UL (ref 0–1.1)
MONOCYTES NFR BLD: 3 % (ref 0–9)
NEUT #: 9.6 X10^3UL (ref 1.8–7.7)
NEUTROPHILS NFR BLD AUTO: 89 % (ref 31–73)
PLATELET # BLD AUTO: 271 X10^3/UL (ref 140–400)
POTASSIUM SERPL-SCNC: 4.2 MMOL/L (ref 3.5–5.1)
PROT SERPL-MCNC: 6.8 G/DL (ref 6.4–8.2)
RBC # BLD AUTO: 4.01 X10^6/UL (ref 3.5–5.4)
SODIUM SERPL-SCNC: 132 MMOL/L (ref 136–145)
WBC # BLD AUTO: 10.8 X10^3/UL (ref 4–11)

## 2021-08-14 RX ADMIN — MONTELUKAST SODIUM SCH MG: 10 TABLET ORAL at 20:11

## 2021-08-14 RX ADMIN — ATORVASTATIN CALCIUM SCH MG: 10 TABLET, FILM COATED ORAL at 20:11

## 2021-08-14 RX ADMIN — IPRATROPIUM BROMIDE AND ALBUTEROL SULFATE SCH ML: .5; 3 SOLUTION RESPIRATORY (INHALATION) at 09:35

## 2021-08-14 RX ADMIN — INSULIN LISPRO SCH UNITS: 100 INJECTION, SOLUTION INTRAVENOUS; SUBCUTANEOUS at 12:28

## 2021-08-14 RX ADMIN — INSULIN LISPRO SCH UNITS: 100 INJECTION, SOLUTION INTRAVENOUS; SUBCUTANEOUS at 17:48

## 2021-08-14 RX ADMIN — INSULIN LISPRO SCH UNITS: 100 INJECTION, SOLUTION INTRAVENOUS; SUBCUTANEOUS at 12:27

## 2021-08-14 RX ADMIN — IPRATROPIUM BROMIDE AND ALBUTEROL SULFATE SCH ML: .5; 3 SOLUTION RESPIRATORY (INHALATION) at 15:39

## 2021-08-14 RX ADMIN — DORZOLAMIDE HYDROCHLORIDE AND TIMOLOL MALEATE SCH DROP: 20; 5 SOLUTION/ DROPS OPHTHALMIC at 06:29

## 2021-08-14 RX ADMIN — LATANOPROST SCH DROP: 50 SOLUTION OPHTHALMIC at 06:29

## 2021-08-14 RX ADMIN — IPRATROPIUM BROMIDE AND ALBUTEROL SULFATE SCH ML: .5; 3 SOLUTION RESPIRATORY (INHALATION) at 05:38

## 2021-08-14 RX ADMIN — IPRATROPIUM BROMIDE AND ALBUTEROL SULFATE SCH ML: .5; 3 SOLUTION RESPIRATORY (INHALATION) at 20:47

## 2021-08-14 RX ADMIN — INSULIN LISPRO SCH UNITS: 100 INJECTION, SOLUTION INTRAVENOUS; SUBCUTANEOUS at 08:32

## 2021-08-14 RX ADMIN — INSULIN LISPRO SCH UNITS: 100 INJECTION, SOLUTION INTRAVENOUS; SUBCUTANEOUS at 08:31

## 2021-08-14 RX ADMIN — LOSARTAN POTASSIUM SCH MG: 25 TABLET, FILM COATED ORAL at 08:26

## 2021-08-14 RX ADMIN — INSULIN GLARGINE SCH UNIT: 100 INJECTION, SOLUTION SUBCUTANEOUS at 20:12

## 2021-08-14 RX ADMIN — APIXABAN SCH MG: 5 TABLET, FILM COATED ORAL at 20:11

## 2021-08-14 RX ADMIN — INSULIN LISPRO SCH UNITS: 100 INJECTION, SOLUTION INTRAVENOUS; SUBCUTANEOUS at 17:47

## 2021-08-14 RX ADMIN — APIXABAN SCH MG: 5 TABLET, FILM COATED ORAL at 08:25

## 2021-08-14 RX ADMIN — METHYLPREDNISOLONE SODIUM SUCCINATE SCH MG: 40 INJECTION, POWDER, FOR SOLUTION INTRAMUSCULAR; INTRAVENOUS at 08:32

## 2021-08-14 RX ADMIN — INSULIN GLARGINE SCH UNIT: 100 INJECTION, SOLUTION SUBCUTANEOUS at 07:44

## 2021-08-14 RX ADMIN — LATANOPROST SCH DROP: 50 SOLUTION OPHTHALMIC at 18:00

## 2021-08-14 RX ADMIN — TRIAMTERENE AND HYDROCHLOROTHIAZIDE SCH TAB: 37.5; 25 TABLET ORAL at 08:25

## 2021-08-14 NOTE — NUR
Nursing note 

Pt has been calm and cooperative for this nurse during this shift. pt has not verbalized any 
pain for this nurse.

## 2021-08-14 NOTE — PDOC
DATE OF SERVICE:


DOS:


DATE: 8/14/21 


TIME: 15:57





SUBJECTIVE:


Patient seen and examined.


She is feeling better today.





OBJECTIVE:


Problems:


Problems


Medical Problems:


(1) Heart failure


Status: Acute  





(2) Weakness


Status: Acute  





Vital Signs/I&O:





                                   Vital Signs








  Date Time  Temp Pulse Resp B/P (MAP) Pulse Ox O2 Delivery O2 Flow Rate FiO2


 


8/14/21 15:40     93 Room Air  


 


8/14/21 15:40 98.7 73 18 113/59 (77)    


 


8/13/21 19:21       2.0 














                                    I & O   


 


 8/13/21 8/13/21 8/14/21





 15:00 23:00 07:00


 


Intake Total 200 ml  240 ml


 


Output Total   900 ml


 


Balance 200 ml  -660 ml








Labs:





                                Laboratory Tests








Test


 8/13/21


16:55 8/13/21


20:00 8/14/21


06:11 8/14/21


06:35


 


Glucose (Fingerstick)


 406 mg/dL


(70-99)  H 441 mg/dL


(70-99)  H 374 mg/dL


(70-99)  H 





 


White Blood Count


 


 


 


 10.8 x10^3/uL


(4.0-11.0)


 


Red Blood Count


 


 


 


 4.01 x10^6/uL


(3.50-5.40)


 


Hemoglobin


 


 


 


 11.4 g/dL


(12.0-15.5)  L


 


Hematocrit


 


 


 


 34.8 %


(36.0-47.0)  L


 


Mean Corpuscular Volume


 


 


 


 87 fL ()





 


Mean Corpuscular Hemoglobin    29 pg (25-35)  


 


Mean Corpuscular Hemoglobin


Concent 


 


 


 33 g/dL


(31-37)


 


Red Cell Distribution Width


 


 


 


 15.0 %


(11.5-14.5)  H


 


Platelet Count


 


 


 


 271 x10^3/uL


(140-400)


 


Neutrophils (%) (Auto)    89 % (31-73)  H


 


Lymphocytes (%) (Auto)    8 % (24-48)  L


 


Monocytes (%) (Auto)    3 % (0-9)  


 


Eosinophils (%) (Auto)    0 % (0-3)  


 


Basophils (%) (Auto)    0 % (0-3)  


 


Neutrophils # (Auto)


 


 


 


 9.6 x10^3uL


(1.8-7.7)  H


 


Lymphocytes # (Auto)


 


 


 


 0.9 x10^3/uL


(1.0-4.8)  L


 


Monocytes # (Auto)


 


 


 


 0.3 x10^3/uL


(0.0-1.1)


 


Eosinophils # (Auto)


 


 


 


 0.0 x10^3/uL


(0.0-0.7)


 


Basophils # (Auto)


 


 


 


 0.0 x10^3/uL


(0.0-0.2)


 


Sodium Level


 


 


 


 132 mmol/L


(136-145)  L


 


Potassium Level


 


 


 


 4.2 mmol/L


(3.5-5.1)


 


Chloride Level


 


 


 


 95 mmol/L


()  L


 


Carbon Dioxide Level


 


 


 


 28 mmol/L


(21-32)


 


Anion Gap    9 (6-14)  


 


Blood Urea Nitrogen


 


 


 


 68 mg/dL


(7-20)  H


 


Creatinine


 


 


 


 1.9 mg/dL


(0.6-1.0)  H


 


Estimated GFR


(Cockcroft-Gault) 


 


 


 25.1  





 


BUN/Creatinine Ratio    36 (6-20)  H


 


Glucose Level


 


 


 


 403 mg/dL


(70-99)  H


 


Calcium Level


 


 


 


 8.8 mg/dL


(8.5-10.1)


 


Total Bilirubin


 


 


 


 0.2 mg/dL


(0.2-1.0)


 


Aspartate Amino Transferase


(AST) 


 


 


 24 U/L (15-37)





 


Alanine Aminotransferase (ALT)


 


 


 


 21 U/L (14-59)





 


Alkaline Phosphatase


 


 


 


 74 U/L


()


 


Total Protein


 


 


 


 6.8 g/dL


(6.4-8.2)


 


Albumin


 


 


 


 2.1 g/dL


(3.4-5.0)  L


 


Albumin/Globulin Ratio


 


 


 


 0.4 (1.0-1.7)


L








Physical Exam:


Chest.  Mildly decreased breath sounds.


CV.  Regular rate and rhythm.


Abdomen.  Soft.





ASSESSMENT:


Weakness.  Continues to improve..  Patient states that she is feeling better.


Tachyarrhythmia; Initially thought to be in AFIB, but review of EKG and tele 

shows MAT. No AFIB or flutter noted.  Back on Cardizem.  Rate under better 

control.


Diastolic CHF; NT Pro BNP elevated, but CXR without pulmonary edema and exam not

consistent with fluid overload.  Improved.  Continue treatments.  [facility] 

[facility].


Hypertension; controlled 


Hyperlipidmia; statin 


Diabetes, II


CKD


Dementia 


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





Justification of Admission:


Justification of Admission:


Justification of Admission Dx:  Yes











BEKAH ROBERT MD            Aug 14, 2021 15:59

## 2021-08-14 NOTE — PN
DATE: 08/13/2021

SUBJECTIVE:  The patient is sitting in her chair, eating her lunch comfortably, 

in no apparent distress.  She is definitely much improved.  She is more awake, 

alert.  She apparently managed to walk all the way to the shower and back with a

walker.  She was evaluated by the physical therapist and she was able to walk to

the bathroom.  She herself stated that she is doing much better.  Unfortunately,

as we started her on steroids, her blood sugar has been extremely high and we 

made some adjustment to her insulin.



PHYSICAL EXAMINATION:

GENERAL:  When I saw her today, she looked pale, not jaundiced or cyanosed.  No 

thyromegaly.  No jugular venous distention.  No limb edema.

VITAL SIGNS:  Her heart rate was 66, blood pressure was 145/75, temperature 

97.8, respiratory rate 20, and oxygen saturation was 95% on room air.

HEAD, EYES, EARS, NOSE, AND THROAT:  Normocephalic, atraumatic.

NECK:  Supple.

HEART:  Normal first and second heart sounds, no gallop or murmur.

CHEST:  Clear to auscultation.  No crepitation or rhonchi.

ABDOMEN:  Distended, soft, nontender.

NEUROLOGIC:  She is demented, but without any obvious lateralizing sign.  She 

moves all extremities without difficulty.  She ambulates with a walker with 

assistance.



Her intake and output were incompletely recorded.



LABORATORY DATA:  Her lab work this morning showed a white cell count 9600, 

hemoglobin 11.7, hematocrit 36, MCV 87 and platelet count 270,000.  Serum sodium

131, potassium was 4.5, chloride 93, bicarbonate 28, anion gap of 10, BUN 42, 

creatinine 1.7.  Estimated GFR was 28 mL per minute.  Her glucose was 157, 

calcium was 9.  Total bilirubin, AST, ALT, alkaline phosphatase were normal.  

Beta-natriuretic peptid was ____.  Total protein 7, albumin 2.1.



ASSESSMENT:

1.  Generalized weakness, multifactorial.

2.  Acute on chronic diastolic congestive heart failure.

3.  Tachyarrhythmia.

4.  Hypertension.

5.  Hyperlipidemia.

6.  Type 2 diabetes mellitus, poorly controlled.

7.  Chronic kidney disease.

8.  Bronchial asthma/chronic obstructive pulmonary disease.

9.  Dementia.

10. Right lower extremity deep vein thrombosis, which she is on Eliquis.



PLAN:  To continue with IV antibiotic.  Continue with steroids.  I increased her

Lantus to 45 units and her Humalog to 15 units before meals and sliding scale.  

We will continue with steroids and hopefully tomorrow, we can cut down the 

steroids to 40 mg to be taken in a tapering fashion and she can be discharged to

[facility].







AMM/CURRY/RUY

DR: AMM/nts   DD: 08/13/2021 13:21

DT: 08/14/2021 00:33   TID: [id number]

## 2021-08-15 VITALS — DIASTOLIC BLOOD PRESSURE: 65 MMHG | SYSTOLIC BLOOD PRESSURE: 125 MMHG

## 2021-08-15 VITALS — SYSTOLIC BLOOD PRESSURE: 104 MMHG | DIASTOLIC BLOOD PRESSURE: 60 MMHG

## 2021-08-15 VITALS — DIASTOLIC BLOOD PRESSURE: 62 MMHG | SYSTOLIC BLOOD PRESSURE: 114 MMHG

## 2021-08-15 VITALS — DIASTOLIC BLOOD PRESSURE: 72 MMHG | SYSTOLIC BLOOD PRESSURE: 108 MMHG

## 2021-08-15 VITALS — DIASTOLIC BLOOD PRESSURE: 59 MMHG | SYSTOLIC BLOOD PRESSURE: 111 MMHG

## 2021-08-15 LAB
ANION GAP SERPL CALC-SCNC: 10 MMOL/L (ref 6–14)
CA-I SERPL ISE-MCNC: 75 MG/DL (ref 7–20)
CALCIUM SERPL-MCNC: 8.9 MG/DL (ref 8.5–10.1)
CHLORIDE SERPL-SCNC: 96 MMOL/L (ref 98–107)
CO2 SERPL-SCNC: 26 MMOL/L (ref 21–32)
CREAT SERPL-MCNC: 1.8 MG/DL (ref 0.6–1)
GFR SERPLBLD BASED ON 1.73 SQ M-ARVRAT: 26.7 ML/MIN
GLUCOSE SERPL-MCNC: 300 MG/DL (ref 70–99)
POTASSIUM SERPL-SCNC: 4.3 MMOL/L (ref 3.5–5.1)
SODIUM SERPL-SCNC: 132 MMOL/L (ref 136–145)

## 2021-08-15 RX ADMIN — IPRATROPIUM BROMIDE AND ALBUTEROL SULFATE SCH ML: .5; 3 SOLUTION RESPIRATORY (INHALATION) at 15:34

## 2021-08-15 RX ADMIN — APIXABAN SCH MG: 5 TABLET, FILM COATED ORAL at 20:41

## 2021-08-15 RX ADMIN — LOSARTAN POTASSIUM SCH MG: 25 TABLET, FILM COATED ORAL at 08:31

## 2021-08-15 RX ADMIN — INSULIN LISPRO SCH UNITS: 100 INJECTION, SOLUTION INTRAVENOUS; SUBCUTANEOUS at 08:28

## 2021-08-15 RX ADMIN — IPRATROPIUM BROMIDE AND ALBUTEROL SULFATE SCH ML: .5; 3 SOLUTION RESPIRATORY (INHALATION) at 20:59

## 2021-08-15 RX ADMIN — MONTELUKAST SODIUM SCH MG: 10 TABLET ORAL at 20:41

## 2021-08-15 RX ADMIN — INSULIN LISPRO SCH UNITS: 100 INJECTION, SOLUTION INTRAVENOUS; SUBCUTANEOUS at 17:26

## 2021-08-15 RX ADMIN — INSULIN LISPRO SCH UNITS: 100 INJECTION, SOLUTION INTRAVENOUS; SUBCUTANEOUS at 12:24

## 2021-08-15 RX ADMIN — INSULIN GLARGINE SCH UNIT: 100 INJECTION, SOLUTION SUBCUTANEOUS at 08:28

## 2021-08-15 RX ADMIN — TRIAMTERENE AND HYDROCHLOROTHIAZIDE SCH TAB: 37.5; 25 TABLET ORAL at 08:30

## 2021-08-15 RX ADMIN — INSULIN GLARGINE SCH UNIT: 100 INJECTION, SOLUTION SUBCUTANEOUS at 20:44

## 2021-08-15 RX ADMIN — INSULIN LISPRO SCH UNITS: 100 INJECTION, SOLUTION INTRAVENOUS; SUBCUTANEOUS at 12:28

## 2021-08-15 RX ADMIN — LATANOPROST SCH DROP: 50 SOLUTION OPHTHALMIC at 18:24

## 2021-08-15 RX ADMIN — IPRATROPIUM BROMIDE AND ALBUTEROL SULFATE SCH ML: .5; 3 SOLUTION RESPIRATORY (INHALATION) at 06:25

## 2021-08-15 RX ADMIN — DORZOLAMIDE HYDROCHLORIDE AND TIMOLOL MALEATE SCH DROP: 20; 5 SOLUTION/ DROPS OPHTHALMIC at 08:23

## 2021-08-15 RX ADMIN — ALBUTEROL SULFATE PRN MG: 108 AEROSOL, METERED RESPIRATORY (INHALATION) at 04:24

## 2021-08-15 RX ADMIN — ATORVASTATIN CALCIUM SCH MG: 10 TABLET, FILM COATED ORAL at 20:41

## 2021-08-15 RX ADMIN — APIXABAN SCH MG: 5 TABLET, FILM COATED ORAL at 08:30

## 2021-08-15 RX ADMIN — INSULIN LISPRO SCH UNITS: 100 INJECTION, SOLUTION INTRAVENOUS; SUBCUTANEOUS at 08:29

## 2021-08-15 RX ADMIN — IPRATROPIUM BROMIDE AND ALBUTEROL SULFATE SCH ML: .5; 3 SOLUTION RESPIRATORY (INHALATION) at 09:37

## 2021-08-15 RX ADMIN — LATANOPROST SCH DROP: 50 SOLUTION OPHTHALMIC at 08:23

## 2021-08-15 NOTE — PN
DATE: 08/15/2021

SUBJECTIVE:  The patient is sitting in the chair comfortably, slightly 

tachypneic; however, she is maintaining her oxygen saturation at 95% on room 

air.



OBJECTIVE:

HEAD, EYES, EARS, NOSE, AND THROAT:  Normocephalic, atraumatic.

NECK:  Supple.

HEART:  Normal first and second heart sounds. No gallop, rub or murmur.

CHEST:  Clear to auscultation.  I could not appreciate any crepitation or 

rhonchi.

ABDOMEN:  Distended, soft, nontender.

NEUROLOGIC:  She was grossly intact.



Her intake was 440, output was 900.



LABORATORY DATA:  As of yesterday, her white cell count was 10,800, hemoglobin 

11, hematocrit 34, MCV 97 and platelet count 271,000.  Her chemistry showed a 

serum sodium of 132, potassium 4.3, chloride 96, bicarbonate 26, anion gap of 

10, BUN 75, creatinine 1.8.  Estimated GFR was 226 mL per minute.  Her glucose 

was 300.  Calcium was 8.9.



ASSESSMENT:

1.  Generalized weakness, multifactorial.

2.  Acute on chronic diastolic congestive heart failure, improving.

3.  Tachyarrhythmia.

4.  Hypertension.

5.  Hyperlipidemia.

6.  Type 2 diabetes mellitus, poorly controlled.

7.  Chronic kidney disease.

8.  Bronchial asthma/chronic obstructive pulmonary disease exacerbation.

9.  Dementia.

10.  Right lower extremity deep vein thrombosis for which she is on Eliquis.



PLAN:  To continue with IV antibiotic.  Continue with steroids.  Continue to 

monitor blood sugar and adjust insulin as needed.  She was switched to oral 

steroids and hopefully tomorrow, we will transfer her to [facility]. She continued

to require large amount of insulin and I increased her scheduled Humalog insulin

to 20 units before meals.







AMM/EKT

DR: AMM/ruchi   DD: 08/15/2021 13:29

DT: 08/15/2021 22:21   TID: [id number]

## 2021-08-15 NOTE — PN
SUBJECTIVE:  The patient is sitting in a chair comfortably, in no apparent 

distress.  She is awake, alert, continued to complain of some shortness of 

breath, but was able to walk with a walker with standby assist.  She is 

maintaining her oxygen saturation at 95% on room air.



PHYSICAL EXAMINATION:

GENERAL:  When I examined her, she was pale, but no jaundice, cyanosis, no 

lymphadenopathy, no thyromegaly, no jugular venous distention.  No limb edema.

VITAL SIGNS:  Her heart rate was 72, blood pressure 100/54, temperature was 98, 

respiratory rate was 18, and oxygen saturation was 95% on room air.

HEAD, EYES, EARS, NOSE AND THROAT:  Shows normocephalic, atraumatic.

NECK:  Supple.

HEART:  Showed normal first and second heart sounds.  No gallop, rub or murmur.

CHEST:  Clear to auscultation, no crepitation or rhonchi.

ABDOMEN:  Distended, soft, and nontender.

NEUROLOGIC:  She is demented, but without any obvious lateralizing sign.  All 

other cranial nerves intact.  She moves extremities without difficulty.  She 

ambulates with a walker, standby assist.



INTAKE AND OUTPUT:  Her intake was 480, output was 1625.



LABORATORY DATA:  This morning showed a white cell count of 10,800, hemoglobin 

11, hematocrit 34, MCV 87, and platelet count 271,000.  Serum sodium was 132, 

potassium 4.2, chloride 95, bicarbonate 28, anion gap of 9, BUN 68, creatinine 

1.9.  Estimated GFR was 25 mL per minute.  Her glucose was 403, calcium was 8.8.

 Total bilirubin, AST, ALT, and alkaline phosphatase were normal.  Total protein

is 6.8, albumin was 2.1.  Her prothrombin time, INR normal.  Urinalysis was 

essentially unremarkable.



ASSESSMENT:

1.  Generalized weakness, multifactorial.

2.  Acute on chronic diastolic congestive heart failure.

3.  Tachyarrhythmia.

4.  Hypertension.

5.  Hyperlipidemia.

6.  Type 2 diabetes mellitus, poorly controlled.

7.  Chronic kidney disease.

8.  Bronchial asthma/chronic obstructive pulmonary disease exacerbation.

9.  Dementia.

10.  Right lower extremity deep vein thrombosis, which she is on Eliquis.



PLAN:  To continue with IV antibiotic.  Continue with steroids.  Continue to 

monitor blood sugar and adjust insulin as needed.  I will cut down her 

methylprednisolone, start her on oral steroid tomorrow, and hopefully discharge 

her to [place] on Monday.







AMM/ARIELLE/ANI

DR: AMM/ruchi   DD: 08/14/2021 13:35

DT: 08/14/2021 23:20   TID: [id number]

## 2021-08-15 NOTE — NUR
Confused this evening, at times attempting to get out bed, removing telemetry monitor 
frequently; bed alarm on for safety, siderails up x3; denies distress, reorients easily 
while nurse is present, VSS; accucheck 335 mg/dl at bedtime, Lantus insulin given as per 
order; will continue to monitor closely.

## 2021-08-15 NOTE — PDOC
DATE OF SERVICE:


DOS:


DATE: 8/15/21 


TIME: 14:18





SUBJECTIVE:


Patient seen and examined





OBJECTIVE:


Problems:


Problems


Medical Problems:


(1) Heart failure


Status: Acute  





(2) Weakness


Status: Acute  





Vital Signs/I&O:





                                   Vital Signs








  Date Time  Temp Pulse Resp B/P (MAP) Pulse Ox O2 Delivery O2 Flow Rate FiO2


 


8/15/21 09:38     97 Room Air  


 


8/15/21 08:31  90  111/59    


 


8/15/21 06:05 97.6  22     


 


8/13/21 19:21       2.0 














                                    I & O   


 


 8/14/21 8/14/21 8/15/21





 15:00 23:00 07:00


 


Intake Total 480 ml 440 ml 300 ml


 


Output Total 1 ml 1000 ml 950 ml


 


Balance 479 ml -560 ml -650 ml








Labs:





                                Laboratory Tests








Test


 8/14/21


16:52 8/14/21


20:01 8/15/21


06:45 8/15/21


07:33


 


Glucose (Fingerstick)


 267 mg/dL


(70-99)  H 335 mg/dL


(70-99)  H 


 321 mg/dL


(70-99)  H


 


Sodium Level


 


 


 132 mmol/L


(136-145)  L 





 


Potassium Level


 


 


 4.3 mmol/L


(3.5-5.1) 





 


Chloride Level


 


 


 96 mmol/L


()  L 





 


Carbon Dioxide Level


 


 


 26 mmol/L


(21-32) 





 


Anion Gap   10 (6-14)   


 


Blood Urea Nitrogen


 


 


 75 mg/dL


(7-20)  H 





 


Creatinine


 


 


 1.8 mg/dL


(0.6-1.0)  H 





 


Estimated GFR


(Cockcroft-Gault) 


 


 26.7  


 





 


Glucose Level


 


 


 300 mg/dL


(70-99)  H 





 


Calcium Level


 


 


 8.9 mg/dL


(8.5-10.1) 





 


Test


 8/15/21


11:47 


 


 





 


Glucose (Fingerstick)


 300 mg/dL


(70-99)  H 


 


 











Physical Exam:


Chest.  Mildly decreased breath sounds.


CV regular rate and rhythm.


Abdomen.  Soft.





ASSESSMENT:


Weakness.  Continues to improve..  Patient states that she is feeling better.


Tachyarrhythmia; Initially thought to be in AFIB, but review of EKG and tele 

shows MAT. No AFIB or flutter noted.  On Cardizem.  Rate under better control.


Diastolic CHF; NT Pro BNP elevated, but CXR without pulmonary edema and exam not

consistent with fluid overload.  Improved.  Continue treatments.  Followed at 

[facility].


Hypertension; controlled 


Hyperlipidmia; statin 


Diabetes, II


CKD


Dementia 


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





Justification of Admission:


Justification of Admission:


Justification of Admission Dx:  Yes











BEKAH ROBERT MD            Aug 15, 2021 14:19

## 2021-08-15 NOTE — NUR
Nursing note 

Pt has been confused and forgetful for this nurse this shift. pt catheter was removed per 
order and policies and procedures.

## 2021-08-16 VITALS — DIASTOLIC BLOOD PRESSURE: 60 MMHG | SYSTOLIC BLOOD PRESSURE: 101 MMHG

## 2021-08-16 VITALS — SYSTOLIC BLOOD PRESSURE: 131 MMHG | DIASTOLIC BLOOD PRESSURE: 70 MMHG

## 2021-08-16 VITALS — DIASTOLIC BLOOD PRESSURE: 57 MMHG | SYSTOLIC BLOOD PRESSURE: 107 MMHG

## 2021-08-16 LAB
ANION GAP SERPL CALC-SCNC: 6 MMOL/L (ref 6–14)
CA-I SERPL ISE-MCNC: 66 MG/DL (ref 7–20)
CALCIUM SERPL-MCNC: 8.9 MG/DL (ref 8.5–10.1)
CHLORIDE SERPL-SCNC: 102 MMOL/L (ref 98–107)
CO2 SERPL-SCNC: 30 MMOL/L (ref 21–32)
CREAT SERPL-MCNC: 1.5 MG/DL (ref 0.6–1)
GFR SERPLBLD BASED ON 1.73 SQ M-ARVRAT: 33 ML/MIN
GLUCOSE SERPL-MCNC: 100 MG/DL (ref 70–99)
POTASSIUM SERPL-SCNC: 4.4 MMOL/L (ref 3.5–5.1)
SODIUM SERPL-SCNC: 138 MMOL/L (ref 136–145)

## 2021-08-16 RX ADMIN — INSULIN GLARGINE SCH UNIT: 100 INJECTION, SOLUTION SUBCUTANEOUS at 07:00

## 2021-08-16 RX ADMIN — LOSARTAN POTASSIUM SCH MG: 25 TABLET, FILM COATED ORAL at 09:11

## 2021-08-16 RX ADMIN — INSULIN LISPRO SCH UNITS: 100 INJECTION, SOLUTION INTRAVENOUS; SUBCUTANEOUS at 12:00

## 2021-08-16 RX ADMIN — ALBUTEROL SULFATE PRN MG: 108 AEROSOL, METERED RESPIRATORY (INHALATION) at 07:48

## 2021-08-16 RX ADMIN — INSULIN LISPRO SCH UNITS: 100 INJECTION, SOLUTION INTRAVENOUS; SUBCUTANEOUS at 11:30

## 2021-08-16 RX ADMIN — INSULIN LISPRO SCH UNITS: 100 INJECTION, SOLUTION INTRAVENOUS; SUBCUTANEOUS at 08:00

## 2021-08-16 RX ADMIN — DORZOLAMIDE HYDROCHLORIDE AND TIMOLOL MALEATE SCH DROP: 20; 5 SOLUTION/ DROPS OPHTHALMIC at 06:43

## 2021-08-16 RX ADMIN — IPRATROPIUM BROMIDE AND ALBUTEROL SULFATE SCH ML: .5; 3 SOLUTION RESPIRATORY (INHALATION) at 11:06

## 2021-08-16 RX ADMIN — IPRATROPIUM BROMIDE AND ALBUTEROL SULFATE SCH ML: .5; 3 SOLUTION RESPIRATORY (INHALATION) at 05:11

## 2021-08-16 RX ADMIN — TRIAMTERENE AND HYDROCHLOROTHIAZIDE SCH TAB: 37.5; 25 TABLET ORAL at 09:12

## 2021-08-16 RX ADMIN — LATANOPROST SCH DROP: 50 SOLUTION OPHTHALMIC at 06:43

## 2021-08-16 RX ADMIN — INSULIN LISPRO SCH UNITS: 100 INJECTION, SOLUTION INTRAVENOUS; SUBCUTANEOUS at 07:30

## 2021-08-16 RX ADMIN — APIXABAN SCH MG: 5 TABLET, FILM COATED ORAL at 09:12

## 2021-08-16 NOTE — DISCH
DISCHARGE ORDERS


DISCHARGE DATE:  Aug 16, 2021


FINAL DIAGNOSIS


acuteon chronic diastolic CHF


TACHYARRHYTMIAS


Copd Exacerbation


CONDITION AT DISCHARGE:  Stable


Code Status:  Full


SNF STAY <30 DAYS:  Yes


POST DISCHARGE ORDERS:


ACTIVITY ORDERS:  Resume previous activity


DIET AFTER DISCHARGE:  ADA





CHECKS AFTER DISCHARGE:


CHECKS AFTER DISCHARGE:  Check blood press - daily





TREATMENT/EQUIPMENT ORDERS:


ADAPTIVE EQUIPMENT NEEDED:  None





DISCHARGE MEDICATIONS:


Home Meds


Reported Medications


Apixaban (ELIQUIS) 5 Mg Tablet, 5 MG PO BID for DVT, TAB


   8/8/21


Albuterol Sulfate (ALBUTEROL SULFATE NEB SOLN) 1.25 Mg/3 Ml Vial.neb, 1 VIAL NEB

Q6HRS for ., #150 ML


   7/20/21


Albuterol Sulfate (PROAIR HFA INHALER) 8.5 Gm Hfa.aer.ad, 2 PUFF IH PRN Q4-6HRS 

PRN for wheezing for 21 Days, #1 INHALER 0 Refills


   7/20/21


Dorzolamide Hcl/Timolol Maleat (DORZOLAMIDE-TIMOLOL EYE DROPS) 10 Ml Drops, 1 

DROP OD DAILY07 for ., #10 ML 0 Refills


   7/20/21


Latanoprost (XALATAN) 2.5 Ml Drops, 1 DROP OU BID76 for GLAUCOMA, DROP


   7/20/21


Triamterene/Hydrochlorothiazid (TRIAMTERENE-HCTZ 37.5-25 MG TB) 1 Each Tablet, 1

TAB PO DAILY for ., #30 TAB 5 Refills


   7/20/21


Pravastatin Sodium (PRAVASTATIN SODIUM) 40 Mg Tablet, 1 TAB PO QHS for ., #90 

TAB 3 Refills


   7/20/21


Losartan Potassium (LOSARTAN POTASSIUM **) 25 Mg Tablet, 7.5 MG PO DAILY for 

HYPERTENSION, TAB


   7/20/21


Diltiazem Hcl (CARTIA XT) 180 Mg Cap.er.24h, 180 MG PO DAILY08 for ., CAP.SR


   7/20/21


Discontinued Reported Medications


Insulin Glargine,Hum.rec.anlog (Basaglar Kwikpen U-100) 100 Unit/1 Ml 

Insuln.pen, 35 UNIT SQ DAILY07 for blood surgar, EACH


   7/20/21











FARHAN ANDERSON MD                Aug 16, 2021 12:43

## 2021-08-16 NOTE — DS
DATE OF DISCHARGE: 08/16/2021

HOSPITAL COURSE:  The patient is an 85-year-old  female patient who was

readmitted to [facility] Emergency Room with generalized weakness, felt

to be multifactorial.  She was basically found to have tachyarrhythmia and 

initially thought to be multifocal atrial tachycardia, although initially it was

felt to be atrial fibrillation.  Her diltiazem was increased to 360 mg.  She was

also treated with IV diuretics for acute on chronic diastolic congestive heart 

failure, also started her on steroids for COPD exacerbation and she did actually

very well, although her blood sugar was extremely high and required multiple 

adjustments of her insulin.  When I saw her; however, today, she was sitting in 

her chair, eating her lunch comfortably, in no apparent respiratory distress.  

On questioning her, denied any complaint.  Nursing staff did not voice any 

concerns, that she had an uneventful night.



PHYSICAL EXAMINATION:

GENERAL:  When I examined her, she was somewhat pale, but no jaundice, no 

cyanosis, no lymphadenopathy, no thyromegaly, no jugular venous distention.  No 

lower limb edema.

VITAL SIGNS:  Her heart rate was 76, blood pressure is 107/57, temperature was 

97.8, respiratory rate 20, and oxygen saturation was 97%.

HEAD, EYES, EARS, NOSE, EYES, EARS, NOSE, AND THROAT:  Normocephalic, 

atraumatic.

NECK:  Supple.

HEART:  Showed normal first and second heart sounds, no gallop, rub or murmur.

CHEST:  Clear to auscultation, no crepitation or rhonchi.

ABDOMEN:  Distended, soft, nontender.

NEUROLOGIC:  She is somewhat demented, but without any obvious lateralizing 

sign.  She is able to ambulate with a walker.



Her intake over the last 24 hours was 1220, output was 1950.



LABORATORY DATA:  As of this morning showed a serum sodium 138, potassium 4.4, 

chloride 102, bicarbonate 30, anion gap of 6, BUN 66, creatinine 1.5.  Estimated

GFR was 33 mL per minute.  Her glucose was 100, calcium was 8.6.  Her white cell

count was 11,000; hemoglobin 11; hematocrit 34; MCV 87 and platelet count 

271,000.  Urinalysis essentially unremarkable.



DISCHARGE MEDICATIONS:  The patient was discharged to [facility] 

to continue diltiazem 360 mg once a day, prednisone 40 mg once a day, Lantus 30 

units at bedtime, montelukast 10 mg at bedtime, atorvastatin 10 mg at bedtime, 

albuterol sulfate, ipratropium bromide or DuoNeb in 3 mL 3 times a day.  She is 

on triamterene/hydrochlorothiazide 1 tablet once a day, losartan potassium 12.5 

mg once a day, apixaban 5 mg twice a day, low dose sliding scale insulin, 

latanoprost one drop to both eyes twice a day, dorzolamide and timolol one drop 

to both eyes daily.



FINAL DISCHARGE DIAGNOSES:

1.  Generalized weakness, multifactorial, resolving.

2.  Acute on chronic diastolic congestive heart failure, improving.

3.  Tachyarrhythmia, heart rate is much better controlled.

4.  Hypertension, also well controlled.

5.  Hyperlipidemia.

6.  Type 2 diabetes mellitus, much better controlled.

7.  Chronic kidney disease.

8.  Bronchial asthma/chronic obstructive pulmonary disease exacerbation.

9.  Dementia.

10.  Right lower extremity deep vein thrombosis, for which she is on Eliquis.







AMM/TIKA/ALETA

DR: AMM/nts   DD: 08/16/2021 12:49

DT: 08/16/2021 20:39   TID: [id number]

## 2021-08-16 NOTE — PDOC
CARDIO Progress Notes


Date & Time


Date of Service


DATE: 8/16/21 


TIME: 08:46


Time of Evaluation


08:46





Subjective


Notes


No chest pain, palpitations, dizziness





Vitals


Vitals





Vital Signs








  Date Time  Temp Pulse Resp B/P (MAP) Pulse Ox O2 Delivery O2 Flow Rate FiO2


 


8/16/21 06:32 97.3 101 20 131/70 (90) 97 Room Air  


 


8/13/21 19:21       2.0 








Weight


Weight [ ]





Input and Output


I.O.











Intake and Output 


 


 8/16/21





 07:00


 


Intake Total 1580 ml


 


Output Total 1200 ml


 


Balance 380 ml


 


 


 


Intake Oral 1580 ml


 


Output Urine Total 1200 ml


 


# Voids 6


 


# Bowel Movements 2











Laboratory


Labs





Laboratory Tests








Test


 8/14/21


16:52 8/14/21


20:01 8/15/21


06:45 8/15/21


07:33


 


Glucose (Fingerstick)


 267 mg/dL


(70-99) 335 mg/dL


(70-99) 


 321 mg/dL


(70-99)


 


Sodium Level


 


 


 132 mmol/L


(136-145) 





 


Potassium Level


 


 


 4.3 mmol/L


(3.5-5.1) 





 


Chloride Level


 


 


 96 mmol/L


() 





 


Carbon Dioxide Level


 


 


 26 mmol/L


(21-32) 





 


Anion Gap   10 (6-14)  


 


Blood Urea Nitrogen


 


 


 75 mg/dL


(7-20) 





 


Creatinine


 


 


 1.8 mg/dL


(0.6-1.0) 





 


Estimated GFR


(Cockcroft-Gault) 


 


 26.7 


 





 


Glucose Level


 


 


 300 mg/dL


(70-99) 





 


Calcium Level


 


 


 8.9 mg/dL


(8.5-10.1) 





 


Test


 8/15/21


11:47 8/15/21


16:49 8/15/21


20:45 8/16/21


06:03


 


Glucose (Fingerstick)


 300 mg/dL


(70-99) 256 mg/dL


(70-99) 178 mg/dL


(70-99) 91 mg/dL


(70-99)


 


Test


 8/16/21


06:22 8/16/21


07:06 


 





 


Sodium Level


 138 mmol/L


(136-145) 


 


 





 


Potassium Level


 4.4 mmol/L


(3.5-5.1) 


 


 





 


Chloride Level


 102 mmol/L


() 


 


 





 


Carbon Dioxide Level


 30 mmol/L


(21-32) 


 


 





 


Anion Gap 6 (6-14)    


 


Blood Urea Nitrogen


 66 mg/dL


(7-20) 


 


 





 


Creatinine


 1.5 mg/dL


(0.6-1.0) 


 


 





 


Estimated GFR


(Cockcroft-Gault) 33.0 


 


 


 





 


Glucose Level


 100 mg/dL


(70-99) 


 


 





 


Calcium Level


 8.9 mg/dL


(8.5-10.1) 


 


 





 


Glucose (Fingerstick)


 


 80 mg/dL


(70-99) 


 














Physical Exams


HEENT:  Neck Supple W Full Motion


Chest:  Symmetric


Lungs:  Other (on RA)


Heart:  other (tele, WAP. rate controlled )


Abdomen:  Soft N/T


Extremities:  No Edema


Neurology:  alert, follow commands, confused





Assessment


Assessment


1.  Weakness; rehab modalities 


2.  Tachyarrhythmia; MAT, WAP. recent event monitor as noted below. No AFIB 

present. Continue Cardizem for rate control 


3.  Chronic diastolic CHF; LVEF 55-60%. Appears compensated.  Follows with 

[facility]. 


4.  Hypertension; controlled 


5.  Hyperlipidmia; statin 


6.  Diabetes, II


7.  OSBALDO on CKD; Cr improved 


8.  Dementia 


9.  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











Records obtained from [facility]





MPI 5/21/21


Normal perfusion with distal small breast attenuation artifact


LVEF normal 


No stress induced EKG changes





Echo 5/21/21


Mild LVH


Normal LV systolic function with EF 55-60%


Grade II diastolic dysfunction


Left atrium is mildly dilated


Mild aortic stenosis


Mild mitral annular calcification


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





13-day Holter Monitor 5/11/21


Asymptomatic increased ventricular ectopic activity totaling 6.4% with included 

some triplets and 4 beat run on NSVT


Increased asymptomatic supraventricular ectopic activity which included multiple

short episodes of PAT up to 13 beats long


No AFIB











NELLY ACUÑA           Aug 16, 2021 08:46

## 2021-08-16 NOTE — NUR
Discharge Note:



CRUZ VIVAS [address] [place] [address]



Discharge instructions and discharge home medications reviewed with Other facility and a 
copy given. All questions have been answered and understanding verbalized. 



The following instructions and handouts were given: Discharge packet



Patient discharged to [facility] with all of pt belongings via facility transport.

## 2021-09-08 ENCOUNTER — HOSPITAL ENCOUNTER (EMERGENCY)
Dept: HOSPITAL 63 - ER | Age: 86
Discharge: ANOTHER HOSPITAL | End: 2021-09-08
Payer: MEDICARE

## 2021-09-08 ENCOUNTER — HOSPITAL ENCOUNTER (INPATIENT)
Dept: HOSPITAL 61 - 4 NORTH | Age: 86
LOS: 5 days | Discharge: NURSING, REHAB OR LONG-TERM CARE | DRG: 482 | End: 2021-09-13
Attending: INTERNAL MEDICINE | Admitting: INTERNAL MEDICINE
Payer: MEDICARE

## 2021-09-08 VITALS — WEIGHT: 151.68 LBS | BODY MASS INDEX: 26.88 KG/M2 | HEIGHT: 63 IN

## 2021-09-08 VITALS — DIASTOLIC BLOOD PRESSURE: 64 MMHG | SYSTOLIC BLOOD PRESSURE: 138 MMHG

## 2021-09-08 VITALS — DIASTOLIC BLOOD PRESSURE: 73 MMHG | SYSTOLIC BLOOD PRESSURE: 156 MMHG

## 2021-09-08 VITALS — DIASTOLIC BLOOD PRESSURE: 45 MMHG | SYSTOLIC BLOOD PRESSURE: 116 MMHG

## 2021-09-08 VITALS — DIASTOLIC BLOOD PRESSURE: 59 MMHG | SYSTOLIC BLOOD PRESSURE: 122 MMHG

## 2021-09-08 VITALS — WEIGHT: 146.61 LBS | HEIGHT: 63 IN | BODY MASS INDEX: 25.98 KG/M2

## 2021-09-08 VITALS — DIASTOLIC BLOOD PRESSURE: 70 MMHG | SYSTOLIC BLOOD PRESSURE: 129 MMHG

## 2021-09-08 VITALS — SYSTOLIC BLOOD PRESSURE: 147 MMHG | DIASTOLIC BLOOD PRESSURE: 72 MMHG

## 2021-09-08 VITALS — SYSTOLIC BLOOD PRESSURE: 128 MMHG | DIASTOLIC BLOOD PRESSURE: 55 MMHG

## 2021-09-08 DIAGNOSIS — J44.9: ICD-10-CM

## 2021-09-08 DIAGNOSIS — S72.011A: Primary | ICD-10-CM

## 2021-09-08 DIAGNOSIS — S72.001A: Primary | ICD-10-CM

## 2021-09-08 DIAGNOSIS — Z83.3: ICD-10-CM

## 2021-09-08 DIAGNOSIS — M19.90: ICD-10-CM

## 2021-09-08 DIAGNOSIS — I48.91: ICD-10-CM

## 2021-09-08 DIAGNOSIS — E78.5: ICD-10-CM

## 2021-09-08 DIAGNOSIS — E11.9: ICD-10-CM
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
## 2021-09-08 DIAGNOSIS — Z20.822: ICD-10-CM

## 2021-09-08 DIAGNOSIS — Z88.0: ICD-10-CM

## 2021-09-08 DIAGNOSIS — G62.9: ICD-10-CM

## 2021-09-08 DIAGNOSIS — Z79.4: ICD-10-CM

## 2021-09-08 DIAGNOSIS — I10: ICD-10-CM
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
## 2021-09-08 DIAGNOSIS — Z79.01: ICD-10-CM

## 2021-09-08 DIAGNOSIS — Z79.899: ICD-10-CM

## 2021-09-08 DIAGNOSIS — R94.4: ICD-10-CM
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
## 2021-09-08 DIAGNOSIS — Z88.8: ICD-10-CM

## 2021-09-08 DIAGNOSIS — J45.909: ICD-10-CM

## 2021-09-08 DIAGNOSIS — D64.9: ICD-10-CM
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
## 2021-09-08 DIAGNOSIS — Z66: ICD-10-CM

## 2021-09-08 LAB
ANION GAP SERPL CALC-SCNC: 7 MMOL/L (ref 6–14)
APTT PPP: YELLOW S
BACTERIA #/AREA URNS HPF: (no result) /HPF
BASOPHILS # BLD AUTO: 0 X10^3/UL (ref 0–0.2)
BASOPHILS NFR BLD: 1 % (ref 0–3)
BILIRUB UR QL STRIP: (no result)
CA-I SERPL ISE-MCNC: 24 MG/DL (ref 7–20)
CALCIUM SERPL-MCNC: 8.3 MG/DL (ref 8.5–10.1)
CHLORIDE SERPL-SCNC: 102 MMOL/L (ref 98–107)
CO2 SERPL-SCNC: 29 MMOL/L (ref 21–32)
CREAT SERPL-MCNC: 1.5 MG/DL (ref 0.6–1)
EOSINOPHIL NFR BLD: 0.3 X10^3/UL (ref 0–0.7)
EOSINOPHIL NFR BLD: 7 % (ref 0–3)
ERYTHROCYTE [DISTWIDTH] IN BLOOD BY AUTOMATED COUNT: 16.4 % (ref 11.5–14.5)
FIBRINOGEN PPP-MCNC: (no result) MG/DL
GFR SERPLBLD BASED ON 1.73 SQ M-ARVRAT: 32.9 ML/MIN
GLUCOSE SERPL-MCNC: 197 MG/DL (ref 70–99)
GLUCOSE UR STRIP-MCNC: (no result) MG/DL
HCT VFR BLD CALC: 31.5 % (ref 36–47)
HGB BLD-MCNC: 10.4 G/DL (ref 12–15.5)
LYMPHOCYTES # BLD: 1.3 X10^3/UL (ref 1–4.8)
LYMPHOCYTES NFR BLD AUTO: 29 % (ref 24–48)
MCH RBC QN AUTO: 29 PG (ref 25–35)
MCHC RBC AUTO-ENTMCNC: 33 G/DL (ref 31–37)
MCV RBC AUTO: 88 FL (ref 79–100)
MONO #: 0.7 X10^3/UL (ref 0–1.1)
MONOCYTES NFR BLD: 15 % (ref 0–9)
NEUT #: 2.3 X10^3UL (ref 1.8–7.7)
NEUTROPHILS NFR BLD AUTO: 48 % (ref 31–73)
NITRITE UR QL STRIP: (no result)
PLATELET # BLD AUTO: 221 X10^3/UL (ref 140–400)
POTASSIUM SERPL-SCNC: 3.7 MMOL/L (ref 3.5–5.1)
PROTHROMBIN TIME: 14.6 SEC (ref 11.7–14)
RBC # BLD AUTO: 3.6 X10^6/UL (ref 3.5–5.4)
RBC #/AREA URNS HPF: (no result) /HPF (ref 0–2)
SODIUM SERPL-SCNC: 138 MMOL/L (ref 136–145)
SP GR UR STRIP: 1.02
SQUAMOUS #/AREA URNS LPF: (no result) /LPF
UROBILINOGEN UR-MCNC: 0.2 MG/DL
WBC # BLD AUTO: 4.7 X10^3/UL (ref 4–11)

## 2021-09-08 PROCEDURE — U0003 INFECTIOUS AGENT DETECTION BY NUCLEIC ACID (DNA OR RNA); SEVERE ACUTE RESPIRATORY SYNDROME CORONAVIRUS 2 (SARS-COV-2) (CORONAVIRUS DISEASE [COVID-19]), AMPLIFIED PROBE TECHNIQUE, MAKING USE OF HIGH THROUGHPUT TECHNOLOGIES AS DESCRIBED BY CMS-2020-01-R: HCPCS

## 2021-09-08 PROCEDURE — 81001 URINALYSIS AUTO W/SCOPE: CPT

## 2021-09-08 PROCEDURE — 94640 AIRWAY INHALATION TREATMENT: CPT

## 2021-09-08 PROCEDURE — 96374 THER/PROPH/DIAG INJ IV PUSH: CPT

## 2021-09-08 PROCEDURE — 83880 ASSAY OF NATRIURETIC PEPTIDE: CPT

## 2021-09-08 PROCEDURE — 83605 ASSAY OF LACTIC ACID: CPT

## 2021-09-08 PROCEDURE — 87086 URINE CULTURE/COLONY COUNT: CPT

## 2021-09-08 PROCEDURE — 99285 EMERGENCY DEPT VISIT HI MDM: CPT

## 2021-09-08 PROCEDURE — 36600 WITHDRAWAL OF ARTERIAL BLOOD: CPT

## 2021-09-08 PROCEDURE — 93005 ELECTROCARDIOGRAM TRACING: CPT

## 2021-09-08 PROCEDURE — C1769 GUIDE WIRE: HCPCS

## 2021-09-08 PROCEDURE — 82962 GLUCOSE BLOOD TEST: CPT

## 2021-09-08 PROCEDURE — 36415 COLL VENOUS BLD VENIPUNCTURE: CPT

## 2021-09-08 PROCEDURE — 96372 THER/PROPH/DIAG INJ SC/IM: CPT

## 2021-09-08 PROCEDURE — 80048 BASIC METABOLIC PNL TOTAL CA: CPT

## 2021-09-08 PROCEDURE — 84484 ASSAY OF TROPONIN QUANT: CPT

## 2021-09-08 PROCEDURE — 82306 VITAMIN D 25 HYDROXY: CPT

## 2021-09-08 PROCEDURE — A6223 GAUZE >16<=48 NO W/SAL W/O B: HCPCS

## 2021-09-08 PROCEDURE — 73502 X-RAY EXAM HIP UNI 2-3 VIEWS: CPT

## 2021-09-08 PROCEDURE — A4930 STERILE, GLOVES PER PAIR: HCPCS

## 2021-09-08 PROCEDURE — C9803 HOPD COVID-19 SPEC COLLECT: HCPCS

## 2021-09-08 PROCEDURE — 85610 PROTHROMBIN TIME: CPT

## 2021-09-08 PROCEDURE — 94760 N-INVAS EAR/PLS OXIMETRY 1: CPT

## 2021-09-08 PROCEDURE — 87641 MR-STAPH DNA AMP PROBE: CPT

## 2021-09-08 PROCEDURE — 85025 COMPLETE CBC W/AUTO DIFF WBC: CPT

## 2021-09-08 PROCEDURE — 87426 SARSCOV CORONAVIRUS AG IA: CPT

## 2021-09-08 PROCEDURE — A6402 STERILE GAUZE <= 16 SQ IN: HCPCS

## 2021-09-08 PROCEDURE — 76000 FLUOROSCOPY <1 HR PHYS/QHP: CPT

## 2021-09-08 PROCEDURE — 71045 X-RAY EXAM CHEST 1 VIEW: CPT

## 2021-09-08 PROCEDURE — G0378 HOSPITAL OBSERVATION PER HR: HCPCS

## 2021-09-08 PROCEDURE — A6253 ABSORPT DRG > 48 SQ IN W/O B: HCPCS

## 2021-09-08 PROCEDURE — 96361 HYDRATE IV INFUSION ADD-ON: CPT

## 2021-09-08 PROCEDURE — C1713 ANCHOR/SCREW BN/BN,TIS/BN: HCPCS

## 2021-09-08 RX ADMIN — LOSARTAN POTASSIUM SCH MG: 25 TABLET, FILM COATED ORAL at 12:00

## 2021-09-08 RX ADMIN — MORPHINE SULFATE PRN MG: 2 INJECTION, SOLUTION INTRAMUSCULAR; INTRAVENOUS at 23:39

## 2021-09-08 RX ADMIN — IPRATROPIUM BROMIDE AND ALBUTEROL SULFATE SCH ML: .5; 3 SOLUTION RESPIRATORY (INHALATION) at 12:02

## 2021-09-08 RX ADMIN — DORZOLAMIDE HYDROCHLORIDE SCH DROP: 20 SOLUTION/ DROPS OPHTHALMIC at 20:30

## 2021-09-08 RX ADMIN — TRIAMTERENE AND HYDROCHLOROTHIAZIDE SCH TAB: 37.5; 25 TABLET ORAL at 12:00

## 2021-09-08 RX ADMIN — ATORVASTATIN CALCIUM SCH MG: 10 TABLET, FILM COATED ORAL at 20:30

## 2021-09-08 RX ADMIN — MORPHINE SULFATE PRN MG: 2 INJECTION, SOLUTION INTRAMUSCULAR; INTRAVENOUS at 12:32

## 2021-09-08 RX ADMIN — IPRATROPIUM BROMIDE AND ALBUTEROL SULFATE SCH ML: .5; 3 SOLUTION RESPIRATORY (INHALATION) at 21:03

## 2021-09-08 RX ADMIN — BACITRACIN SCH MLS/HR: 5000 INJECTION, POWDER, FOR SOLUTION INTRAMUSCULAR at 23:12

## 2021-09-08 RX ADMIN — INSULIN LISPRO SCH UNITS: 100 INJECTION, SOLUTION INTRAVENOUS; SUBCUTANEOUS at 17:00

## 2021-09-08 RX ADMIN — INSULIN GLARGINE SCH UNIT: 100 INJECTION, SOLUTION SUBCUTANEOUS at 20:36

## 2021-09-08 RX ADMIN — IPRATROPIUM BROMIDE AND ALBUTEROL SULFATE SCH ML: .5; 3 SOLUTION RESPIRATORY (INHALATION) at 16:32

## 2021-09-08 RX ADMIN — LATANOPROST SCH DROP: 50 SOLUTION OPHTHALMIC at 20:30

## 2021-09-08 RX ADMIN — INSULIN LISPRO SCH UNITS: 100 INJECTION, SOLUTION INTRAVENOUS; SUBCUTANEOUS at 11:54

## 2021-09-08 RX ADMIN — TIMOLOL MALEATE SCH DROP: 2.5 SOLUTION OPHTHALMIC at 20:30

## 2021-09-08 RX ADMIN — MORPHINE SULFATE PRN MG: 2 INJECTION, SOLUTION INTRAMUSCULAR; INTRAVENOUS at 20:31

## 2021-09-08 RX ADMIN — MONTELUKAST SODIUM SCH MG: 10 TABLET, FILM COATED ORAL at 20:30

## 2021-09-08 RX ADMIN — ONDANSETRON PRN MG: 2 INJECTION INTRAMUSCULAR; INTRAVENOUS at 12:32

## 2021-09-08 RX ADMIN — ONDANSETRON PRN MG: 2 INJECTION INTRAMUSCULAR; INTRAVENOUS at 23:39

## 2021-09-08 RX ADMIN — BACITRACIN SCH MLS/HR: 5000 INJECTION, POWDER, FOR SOLUTION INTRAMUSCULAR at 04:18

## 2021-09-08 RX ADMIN — INSULIN GLARGINE SCH UNIT: 100 INJECTION, SOLUTION SUBCUTANEOUS at 20:49

## 2021-09-08 NOTE — NUR
Per MD's orders, partial dose(15 units) of scheduled Lantus given this HS d/t pt's NPO 
status during most of day and low oral intake at dinner.

## 2021-09-08 NOTE — PHYS DOC
Past History


Past Medical History:  Arthritis


 (FRANCISCO J LANGSTON)


Past Surgical History:  No Surgical History


 (FRANCISCO J LANGSTON)


Alcohol Use:  None


 (FRANCISCO J LANGSTON)





General Adult


EDM:


Chief Complaint:  MECHANICAL FALL


Problems:  


(1) Right groin pain


(2) Fall from bed (FRANCISCO J LANGSTON)





HPI:


HPI:





Patient is a 86 year old female with history of diabetes, dementia, paroxysmal 

A. fib, COPD, hypertension, chronic weakness who presents from [facility] 

and rehab with right-sided groin pain after falling out of bed.  Patient states 

she was getting out of bed to do something, but cannot remember what, when she 

leaned down and "just kept going."  Patient states her pain is 9 out of 10 in 

the right groin without radiation.  She has pain with both flexion and extension

of her right lower extremity.  She denies any other pain or trauma, dizziness, 

headache, pelvic pain, dysuria and hematuria.  Patient has no other complaints 

at this time





Per paperwork from rehab facility, patient was found on the floor on her right 

side with her call light in her hand.  At that time she had the same complaints 

as stated above.  Reports states she is currently on Eliquis 5 mg twice a day.  

Their on-call physician gave a verbal order to take her to the emergency 

department.  Her  was informed as well.


 (FRANCISCO J LANGSTON)





Review of Systems:


Review of Systems:


Constitutional:  Denies fever or chills 


Respiratory:  Denies cough or shortness of breath 


Cardiovascular:  Denies chest pain or edema 


GI:  Denies abdominal pain, nausea, vomiting, bloody stools or diarrhea 


: See HPI


Musculoskeletal: See HPI


Integument:  Denies lacerations, abrasions


Neurologic:  See HPI 


Endocrine:  Denies polyuria or polydipsia 


 (FRANCISCO J LANGSTON)





Allergies:


Allergies:





Allergies








Coded Allergies Type Severity Reaction Last Updated Verified


 


  ibuprofen Allergy Severe Anaphylaxis 8/11/21 Yes


 


  Penicillins Allergy Intermediate  8/12/21 Yes








 (FRANCISCO J LANGSTON)





Physical Exam:


PE:





Constitutional: Well developed, well nourished, no acute distress, non-toxic 

appearance. []


HENT: Normocephalic, atraumatic, bilateral external ears normal, nose normal. []


Eyes: PERRLA, EOMI, conjunctiva normal, no discharge. [] 


Neck: Normal range of motion, no tenderness, supple, no stridor. [] 


Cardiovascular:Heart rate regular rhythm, no murmur []


Lungs & Thorax:  Bilateral breath sounds clear to auscultation []


Abdomen: Protuberant, bowel sounds normal, soft, no tenderness, no masses, no 

pulsatile masses. [] 


Skin: Healing rash noted on bilateral lower extremities.  Warm, dry. [] 


Back: No tenderness, no CVA tenderness. [] 


Extremities: No obvious deformities noted in extremities x4.  No tenderness to 

palpation in extremities x4.  Range of motion is limited in right lower 

extremity at the hip secondary to pain.  No cyanosis, no clubbing, no edema. [] 


Neurologic: Alert and oriented X 3, normal motor function, normal sensory 

function, no focal deficits noted. []


 (FRANCISCO J LANGSTON)





Current Patient Data:


Vital Signs:





                                   Vital Signs








  Date Time  Temp Pulse Resp B/P (MAP) Pulse Ox O2 Delivery O2 Flow Rate FiO2


 


9/8/21 00:09 98.0 74 20 116/54 94 Room Air  








 (FRANCISCO J LANGSTON)





EKG:


EKG:


EKG interpreted by Dr. Terry: Sinus rhythm rate 74 bpm.  Low voltage EKG.  No 

STEMI.


 (FRANCISCO J LANGSTON)


EKG:


My interpretation EKG shows a sinus rhythm 74 bpm.  Does have leftward axis 

changes.  Does have anterior septal changes.  No findings however acute STEMI 

with contralateral changes.  Time of EKG is 00 20 minutes


 (MISTY TERRY MD)


Radiology/Procedures:


Radiology/Procedures:


[]


 (FRANCISCO J LANGSTON)


Radiology/Procedures:


My interpretation of Pelvis film show a  Rt. Hip Femur neck fracture.


My interpretaiton of CXR - shows no consolidated infiltrates.  No pneumothorax. 

 No pleural effusion.  Does have some cardiomegaly.  There is atherosclerotic 

calcifications of aorta.  Arthritic changes of the shoulders.  See formal 

reports for hip and chest x-ray when available.


 (MISTY TERRY MD)


Heart Score:


C/O Chest Pain:  No


 (FRANCISCO J LANGSTON)





Course & Med Decision Making:


Course & Med Decision Making


Pertinent Labs and Imaging studies reviewed. (See chart for details)





While still awaiting labs and imaging results, care was transferred to Dr. Terry at 01:00.


 (FRANCISCO J LANGSTON)


Course & Med Decision Making





See MIGUEL Langston notes for details prior shift change.  





Discussed presentation, testing and tx.   plan with Dr. Soria- Hospitalist [facility].   

Will accept pt in transfer.   0140


Discussed presentation, testing and tx.  plan with Dr. Ordonez  -peggy   will

 consult on pt.  0153 hrs. 











Impression:





1. Trip and Fall


2.  Right hip fracture


3.  Anemia Hgb 10.4


4.  Elevated BUN 24/Creat 1.3- Renal Insuf./Chronic Renal Dz- Stage IV


5.  DM = glucose 195


6.  Hx. Afib


7.  Hx. Asthma


8.  Hx. COPD


9.   Hx  Peripheral Neuropathy


10. Hx. Dementia with Behavioral Disturbance


11. Hx. of  DVT


12. On Eliquis 5 bid


 (MISTY TERRY MD)


Dragon Disclaimer:


Dragon Disclaimer:


This electronic medical record was generated, in whole or in part, using a voice

 recognition dictation system.


 (FRANCISCO J LANGSTON)





Departure


Departure:


Referrals:  


MIGUEL LIPSCOMB MD (PCP)





Attending Signature


Attending Signature


I have participated in the care of this patient and I have reviewed and agree 

with all pertinent clinical information above including history, exam, and 

recommendations.





 (MISYT TERRY MD)





Dragon Disclaimer


This chart was dictated in whole or in part using Voice Recognition software in 

a busy, high-work load, and often noisy Emergency Department environment.  It 

may contain unintended and wholly unrecognized errors or omissions.


 (MISTY TERRY MD)





Dragon Disclaimer


This chart was dictated in whole or in part using Voice Recognition software in 

a busy, high-work load, and often noisy Emergency Department environment.  It 

may contain unintended and wholly unrecognized errors or omissions.


 (MISTY TERRY MD)











FRANCISCO J LANGSTON               Sep 8, 2021 00:29


MISTY TERRY MD            Sep 8, 2021 01:33

## 2021-09-08 NOTE — NUR
became nauseated and had increase in pain. emesis of approx 100 cc brown liquid. medicated 
with morphine and zofran

## 2021-09-08 NOTE — NUR
ADMIT NOTE

The patient, CRUZ VIVAS, 85 y/o, F admitted by TYLER GONZALEZ MD, was given written 
information regarding hospital policies, unit procedures and contact persons. Patient 
arrived to unit via EMS from [facility] ED. 

Patient A&O to self, place and situation, VSS, and placed on 2L of O2 on admission. 

Patient orientated to room, plan of care discussed, and admit packet reviewed.

Patient's medical history and home medications reviewed from report received from [facility].

Patient's allergies and preferred pharmacy verified. MD notified of patient's admit to floor 
and orders received.

Patient in bed resting, bed in lowest/locked position, SCDs/Meena in place, and call light 
within reach; no other needs voiced at this time.

## 2021-09-08 NOTE — HP
ADMIT DATE: 09/08/2021

CHIEF COMPLAINT:  Fall with hip pain.



HISTORY OF PRESENT ILLNESS:  The patient is a pleasant elderly female who fell, 

has hip pain.  In the ER, they did imaging.  She has a hip fracture.  She has 

now been admitted.  We are consulting orthopedics.



PAST MEDICAL HISTORY:  Arthritis, hypertension, hyperlipidemia, polypharmacy, 

asthma, chronic anticoagulation, glaucoma, diabetes.



ALLERGIES:  PENICILLIN, IBUPROFEN.



FAMILY HISTORY:  Diabetes.



SOCIAL HISTORY:  She does not drink, smoke or take drugs.  She is retired.



MEDICATIONS:  Reviewed.  She is on 14.  Please refer to the MRAD.



REVIEW OF SYSTEMS:

GENERAL:  No history of weight change, weakness or fevers.

SKIN:  No bruising, hair changes or rashes.

EYES:  No blurred, double or loss of vision.

NOSE AND THROAT:  No history of nosebleeds, hoarseness or sore throat.

HEART:  No history of palpitations, chest pain or shortness of breath on 

exertion.

LUNGS:  Denies cough, hemoptysis, wheezing or shortness of breath.

GASTROINTESTINAL:  Denies changes in appetite, nausea, vomiting, diarrhea or 

constipation.

GENITOURINARY:  No history of frequency, urgency, hesitancy or nocturia.

NEUROLOGIC:  Denies history of numbness, tingling, tremor or weakness.

PSYCHIATRIC:  No history of panic, anxiety or depression.

ENDOCRINE:  No history of heat or cold intolerance, polyuria or polydipsia.

EXTREMITIES:  She complains of left hip pain.



PHYSICAL EXAMINATION:

VITALS:  Within normal limits and are stable.

GENERAL:  No apparent distress.  Alert and oriented.

HEENT:  Normal cephalic atraumatic, external auditory canals are patent.

EYES:  Extraocular muscles are intact, pupils are equally round and reactive to 

light and accommodation.

MUSCULOSKELETAL:  Well developed, well nourished, good range of motion.

ENDOCRINE:  No thyromegaly was palpated.

LYMPHATICS:  No cervical chain or axillary nodes were noted.

HEMATOPOIETIC:  No bruising.

NECK:  Supple, no JVD, no thyromegaly was noted.

LUNGS:  Clear to auscultation in all lung fields without rhonchi or wheezing.

HEART:  RRR, S1, S2 present.  Peripheral pulses intact, no obvious murmurs were 

noted.

ABDOMEN:  Soft, nontender.  Positive bowel sounds no organomegaly, normal bowel 

sounds.

EXTREMITIES:  The left hip is externally rotated.

NEUROLOGIC:  Normal speech, normal tone.  A and O x 3, moves all extremities, no

obvious focal deficits.

PSYCHIATRIC:  Normal affect, normal mood.  Stable.

SKIN:  No ulcerations or rashes, good skin turgor, no jaundice.

VASCULAR:  Good capillary refill, neurovascular bundle appears to be intact.



ASSESSMENT AND PLAN:  Fall with left hip fracture.  The patient has been 

admitted.  We have consulted Orthopedics.  We will hold her home meds for now.  

I suspect she will go to surgery soon.  Postoperatively, she is going to need 

wound care and probable skilled nursing in a few days.







ALEX

DR: Anoop   DD: 09/08/2021 08:48

DT: 09/08/2021 09:13   TID: [id number]

## 2021-09-08 NOTE — NUR
SW following. Discussed with RN, pt from [facility] - SW trying to reach [facility] to verify 
if pt is LTC or SNF, 2L, NPO. Ortho following. SW will continue to follow.

## 2021-09-08 NOTE — EKG
[facility] [address], [place] [address]

Test Date:    2021               Test Time:    00:20:50

Pat Name:     CRUZ VIVAS          Department:   

Patient ID:   [id number]           Room:          

Gender:       F                        Technician:   

:          [id number]               Requested By: DEPARTMENT EMERGENCY

Order Number: [id number]            Reading MD:   Rony Martin MD

                                 Measurements

Intervals                              Axis          

Rate:         74                       P:            -55

IN:           178                      QRS:          -22

QRSD:         88                       T:            23

QT:           418                                    

QTc:          464                                    

                           Interpretive Statements

SINUS RHYTHM

LEFTWARD AXIS

LOW LIMB LEAD VOLTAGE

QRS(T) CONTOUR ABNORMALITY

CONSISTENT WITH ANTEROSEPTAL INFARCT

AGE UNDETERMINED

ABNORMAL ECG

Electronically Signed On 2021 9:07:03 CDT by Rony Martin MD

## 2021-09-08 NOTE — NUR
Nat is resting quietly with eyes closed. she has o2 on at 2lnc she arouses easily. alberto 
is patent with yellow urine. spoke with daughter and  on the phone.They were given 
the [employer] code to call for information.   code 8044. update given spoke about delay on 
surgery related to the Eliquis and bleeding potential.

## 2021-09-09 VITALS — SYSTOLIC BLOOD PRESSURE: 98 MMHG | DIASTOLIC BLOOD PRESSURE: 41 MMHG

## 2021-09-09 VITALS — DIASTOLIC BLOOD PRESSURE: 50 MMHG | SYSTOLIC BLOOD PRESSURE: 111 MMHG

## 2021-09-09 VITALS — DIASTOLIC BLOOD PRESSURE: 52 MMHG | SYSTOLIC BLOOD PRESSURE: 108 MMHG

## 2021-09-09 VITALS — DIASTOLIC BLOOD PRESSURE: 49 MMHG | SYSTOLIC BLOOD PRESSURE: 93 MMHG

## 2021-09-09 VITALS — DIASTOLIC BLOOD PRESSURE: 42 MMHG | SYSTOLIC BLOOD PRESSURE: 98 MMHG

## 2021-09-09 VITALS — DIASTOLIC BLOOD PRESSURE: 60 MMHG | SYSTOLIC BLOOD PRESSURE: 133 MMHG

## 2021-09-09 VITALS — DIASTOLIC BLOOD PRESSURE: 50 MMHG | SYSTOLIC BLOOD PRESSURE: 110 MMHG

## 2021-09-09 VITALS — SYSTOLIC BLOOD PRESSURE: 113 MMHG | DIASTOLIC BLOOD PRESSURE: 51 MMHG

## 2021-09-09 VITALS — SYSTOLIC BLOOD PRESSURE: 102 MMHG | DIASTOLIC BLOOD PRESSURE: 52 MMHG

## 2021-09-09 VITALS — SYSTOLIC BLOOD PRESSURE: 133 MMHG | DIASTOLIC BLOOD PRESSURE: 70 MMHG

## 2021-09-09 VITALS — SYSTOLIC BLOOD PRESSURE: 108 MMHG | DIASTOLIC BLOOD PRESSURE: 41 MMHG

## 2021-09-09 VITALS — DIASTOLIC BLOOD PRESSURE: 53 MMHG | SYSTOLIC BLOOD PRESSURE: 116 MMHG

## 2021-09-09 PROCEDURE — 0QS634Z REPOSITION RIGHT UPPER FEMUR WITH INTERNAL FIXATION DEVICE, PERCUTANEOUS APPROACH: ICD-10-PCS | Performed by: ORTHOPAEDIC SURGERY

## 2021-09-09 RX ADMIN — INSULIN GLARGINE SCH UNIT: 100 INJECTION, SOLUTION SUBCUTANEOUS at 21:38

## 2021-09-09 RX ADMIN — DORZOLAMIDE HYDROCHLORIDE SCH DROP: 20 SOLUTION/ DROPS OPHTHALMIC at 21:27

## 2021-09-09 RX ADMIN — IPRATROPIUM BROMIDE AND ALBUTEROL SULFATE SCH ML: .5; 3 SOLUTION RESPIRATORY (INHALATION) at 06:14

## 2021-09-09 RX ADMIN — IPRATROPIUM BROMIDE AND ALBUTEROL SULFATE SCH ML: .5; 3 SOLUTION RESPIRATORY (INHALATION) at 11:40

## 2021-09-09 RX ADMIN — BACITRACIN SCH MLS/HR: 5000 INJECTION, POWDER, FOR SOLUTION INTRAMUSCULAR at 16:45

## 2021-09-09 RX ADMIN — INSULIN LISPRO SCH UNITS: 100 INJECTION, SOLUTION INTRAVENOUS; SUBCUTANEOUS at 17:00

## 2021-09-09 RX ADMIN — MORPHINE SULFATE PRN MG: 2 INJECTION, SOLUTION INTRAMUSCULAR; INTRAVENOUS at 05:52

## 2021-09-09 RX ADMIN — MORPHINE SULFATE PRN MG: 2 INJECTION, SOLUTION INTRAMUSCULAR; INTRAVENOUS at 02:57

## 2021-09-09 RX ADMIN — DORZOLAMIDE HYDROCHLORIDE SCH DROP: 20 SOLUTION/ DROPS OPHTHALMIC at 09:00

## 2021-09-09 RX ADMIN — IPRATROPIUM BROMIDE AND ALBUTEROL SULFATE SCH ML: .5; 3 SOLUTION RESPIRATORY (INHALATION) at 16:00

## 2021-09-09 RX ADMIN — FENTANYL CITRATE PRN MCG: 50 INJECTION INTRAMUSCULAR; INTRAVENOUS at 18:50

## 2021-09-09 RX ADMIN — TRIAMTERENE AND HYDROCHLOROTHIAZIDE SCH TAB: 37.5; 25 TABLET ORAL at 08:16

## 2021-09-09 RX ADMIN — INSULIN LISPRO SCH UNITS: 100 INJECTION, SOLUTION INTRAVENOUS; SUBCUTANEOUS at 08:00

## 2021-09-09 RX ADMIN — ATORVASTATIN CALCIUM SCH MG: 10 TABLET, FILM COATED ORAL at 21:20

## 2021-09-09 RX ADMIN — LOSARTAN POTASSIUM SCH MG: 25 TABLET, FILM COATED ORAL at 08:16

## 2021-09-09 RX ADMIN — DORZOLAMIDE HYDROCHLORIDE SCH DROP: 20 SOLUTION/ DROPS OPHTHALMIC at 08:18

## 2021-09-09 RX ADMIN — BACITRACIN SCH MLS/HR: 5000 INJECTION, POWDER, FOR SOLUTION INTRAMUSCULAR at 19:45

## 2021-09-09 RX ADMIN — IPRATROPIUM BROMIDE AND ALBUTEROL SULFATE SCH ML: .5; 3 SOLUTION RESPIRATORY (INHALATION) at 20:32

## 2021-09-09 RX ADMIN — LATANOPROST SCH DROP: 50 SOLUTION OPHTHALMIC at 08:18

## 2021-09-09 RX ADMIN — FENTANYL CITRATE PRN MCG: 50 INJECTION INTRAMUSCULAR; INTRAVENOUS at 19:02

## 2021-09-09 RX ADMIN — MORPHINE SULFATE PRN MG: 2 INJECTION, SOLUTION INTRAMUSCULAR; INTRAVENOUS at 20:08

## 2021-09-09 RX ADMIN — TIMOLOL MALEATE SCH DROP: 2.5 SOLUTION OPHTHALMIC at 08:19

## 2021-09-09 RX ADMIN — MONTELUKAST SODIUM SCH MG: 10 TABLET, FILM COATED ORAL at 21:19

## 2021-09-09 RX ADMIN — BACITRACIN SCH MLS/HR: 5000 INJECTION, POWDER, FOR SOLUTION INTRAMUSCULAR at 17:29

## 2021-09-09 RX ADMIN — TIMOLOL MALEATE SCH DROP: 2.5 SOLUTION OPHTHALMIC at 21:25

## 2021-09-09 RX ADMIN — FENTANYL CITRATE PRN MCG: 50 INJECTION INTRAMUSCULAR; INTRAVENOUS at 17:28

## 2021-09-09 RX ADMIN — ACETAMINOPHEN PRN MG: 325 TABLET, FILM COATED ORAL at 21:22

## 2021-09-09 RX ADMIN — INSULIN LISPRO SCH UNITS: 100 INJECTION, SOLUTION INTRAVENOUS; SUBCUTANEOUS at 12:00

## 2021-09-09 NOTE — PDOC4
OPERATIVE NOTE


Date:


Date:  Sep 9, 2021





Pre-Op Diagnosis:


Impacted subcapital fracture right hip





Post-Op Diagnosis:


Same





Procedure Performed:


Open reduction percutaneous pinning fixation right hip fracture





Surgeon:


Mery





Anesthesia Type:


General





Blood Loss:


50 cc





Specimans Obtained:


None





Findings:


See dictation





Complications:


None











URIAH FOX Jr. DO      Sep 9, 2021 18:33

## 2021-09-09 NOTE — OP
DATE OF SURGERY: 09/09/2021

PREOPERATIVE DIAGNOSIS:  Impacted subcapital fracture, right hip.



POSTOPERATIVE DIAGNOSIS:  Impacted subcapital fracture, right hip.



PROCEDURE:  Open reduction with percutaneous pin fixation, right hip.



SURGEON:  Kaleb Ordonez Jr, DO



ANESTHESIA:  General.



COMPLICATIONS:  None.



ESTIMATED BLOOD LOSS:  50 mL.



DESCRIPTION OF PROCEDURE:  The patient was taken to the operative suite, given a

general anesthetic, placed on the fracture table.  There was noted to be 

satisfactory reduction and continued impaction of the right hip fracture.  

Therefore, the right hip was prepped and draped in a sterile fashion.  Incision 

was made through skin and subcutaneous tissues down through to the iliotibial 

band down to the femur.  The guide pins were then placed from lateral through 

the neck and into the head in an appropriate position.  Three separate pins were

placed.  Again, anatomically, this looked very good.  Therefore, the outer 

cortex was drilled and then, the 3 screws were then affixed to the area of the 

femur and into the head and neck and again, a good compression and there was 

excellent compression of all screws.  These were 85 mm, 80 mm and 75 mm in 

length.  The guide pins were then removed.  Final x-rays were taken.  This wound

was then thoroughly irrigated.  Superficial tissues and skin was reapproximated.

 Sterile dressing was applied.  The patient was then taken from the operative 

bed to a postoperative bed and taken to the PACU in stable condition.







ERIS

DR: Violeta   DD: 09/09/2021 18:28

DT: 09/09/2021 18:48   TID: [id number]

## 2021-09-09 NOTE — PDOC
[employer] PROGRESS NOTE


Date of Service


DOS:


DATE: 9/9/21 


TIME: 10:12





Chief Complaint


Chief Complaint


Fall with left hip fracture. 


Arthritis. 


Hypertension.


Hyperlipidemia.


Polypharmacy.


Asthma.


Chronic Anticoagulation. 


Glaucoma.


Diabetes.





History of Present Illness


History of Present Illness


9/9: Mrs. Hood was seen and evaluated this morning while in her room at 

bedside. We reviewed her chart and discussed her current disposition with her 

nurse. She is currently receiving 3L of Oxygen. Dr. Ordonez plans to operate 

this afternoon.





Vitals/I&O


Vitals/I&O:





                                   Vital Signs








  Date Time  Temp Pulse Resp B/P (MAP) Pulse Ox O2 Delivery O2 Flow Rate FiO2


 


9/9/21 08:18  110  133/70    


 


9/9/21 07:56      Nasal Cannula 2.0 


 


9/9/21 07:00 99.4  18  88   





 99.4       














                                    I & O   


 


 9/8/21 9/8/21 9/9/21





 15:00 23:00 07:00


 


Intake Total 0 ml 550 ml 1250 ml


 


Output Total 550 ml 1000 ml 1000 ml


 


Balance -550 ml -450 ml 250 ml











Physical Exam


General:  Alert, Oriented X3, Cooperative


Heart:  Regular rate, No murmurs


Lungs:  Clear


Abdomen:  Normal bowel sounds


Extremities:  No clubbing


Skin:  No rashes





Labs


Labs:





Laboratory Tests








Test


 9/8/21


11:40 9/8/21


17:21 9/8/21


20:29 9/9/21


08:15


 


Glucose (Fingerstick)


 96 mg/dL


(70-99) 82 mg/dL


(70-99) 154 mg/dL


(70-99) 108 mg/dL


(70-99)











Review of Systems


Review of Systems:


No headache


No rashes





Assessment and Plan


Assessmemt and Plan


Assessment:


1. Fall with left hip fracture. 


2. Arthritis. 


3. Hypertension.


4. Hyperlipidemia.


5. Polypharmacy.


6. Asthma.


7. Chronic Anticoagulation. 


8. Glaucoma.


9. Diabetes. 





Plan:


1. Surgery this afternoon with Dr. Ordonez


2. Full Code


3. IV Fluids


4. Continue Supplemental Oxygen


5. PT/OT following surgical procedure





Comment


Review of Relevant


I have reviewed the following items ellie (where applicable) has been applied.


Medications:





Current Medications








 Medications


  (Trade)  Dose


 Ordered  Sig/Surendra


 Route


 PRN Reason  Start Time


 Stop Time Status Last Admin


Dose Admin


 


 Atorvastatin


 Calcium


  (Lipitor)  10 mg  HS


 PO


   9/8/21 21:00


    9/8/21 20:30





 


 Albuterol/


 Ipratropium


  (Duoneb)  3 ml  RTQID


 NEB


   9/8/21 12:00


    9/9/21 06:14





 


 Latanoprost


  (Xalatan)  1 drop  QHS


 OU


   9/8/21 21:00


    9/9/21 08:18





 


 Losartan Potassium


  (Cozaar)  25 mg  DAILY


 PO


   9/8/21 12:00


    9/9/21 08:16





 


 Montelukast Sodium


  (Singulair)  10 mg  HS


 PO


   9/8/21 21:00


    9/8/21 20:30





 


 Diltiazem HCl


  (Cardizem 24hr


 Cd)  360 mg  DAILY


 PO


   9/8/21 12:00


    9/9/21 08:18





 


 Dorzolamide HCl


  (Trusopt)  1 drop  BID


 OD


   9/8/21 21:00


    9/9/21 09:00





 


 Insulin Glargine


  (Lantus Syringe)  30 unit  QHS


 SQ


   9/8/21 21:00


    9/8/21 20:49





 


 Triamterene/HCTZ


  (Maxzide 37.5/


 25mg)  1 tab  DAILY


 PO


   9/8/21 12:00


    9/9/21 08:16





 


 Timolol Maleate


  (Timoptic 0.25%


 Ophth)  1 drop  BID


 OU


   9/8/21 21:00


    9/9/21 08:19














Justifications for Admission


Other Justification














ELVA MILNER III DO            Sep 9, 2021 10:22

## 2021-09-09 NOTE — NUR
MARY following. Discussed with RN. MARY verified pt is a SNF resident at [facility], 2L, NPO. 
Ortho following. Pt having surgery today. MARY will continue to follow.

## 2021-09-10 VITALS — SYSTOLIC BLOOD PRESSURE: 106 MMHG | DIASTOLIC BLOOD PRESSURE: 49 MMHG

## 2021-09-10 VITALS — SYSTOLIC BLOOD PRESSURE: 104 MMHG | DIASTOLIC BLOOD PRESSURE: 41 MMHG

## 2021-09-10 VITALS — DIASTOLIC BLOOD PRESSURE: 55 MMHG | SYSTOLIC BLOOD PRESSURE: 114 MMHG

## 2021-09-10 VITALS — DIASTOLIC BLOOD PRESSURE: 50 MMHG | SYSTOLIC BLOOD PRESSURE: 114 MMHG

## 2021-09-10 VITALS — DIASTOLIC BLOOD PRESSURE: 47 MMHG | SYSTOLIC BLOOD PRESSURE: 121 MMHG

## 2021-09-10 VITALS — DIASTOLIC BLOOD PRESSURE: 40 MMHG | SYSTOLIC BLOOD PRESSURE: 98 MMHG

## 2021-09-10 LAB
ANION GAP SERPL CALC-SCNC: 8 MMOL/L (ref 6–14)
BASOPHILS # BLD AUTO: 0 X10^3/UL (ref 0–0.2)
BASOPHILS NFR BLD: 0 % (ref 0–3)
BUN SERPL-MCNC: 20 MG/DL (ref 7–20)
CALCIUM SERPL-MCNC: 8.5 MG/DL (ref 8.5–10.1)
CHLORIDE SERPL-SCNC: 98 MMOL/L (ref 98–107)
CO2 SERPL-SCNC: 28 MMOL/L (ref 21–32)
CREAT SERPL-MCNC: 1.4 MG/DL (ref 0.6–1)
EOSINOPHIL NFR BLD: 0.2 X10^3/UL (ref 0–0.7)
EOSINOPHIL NFR BLD: 3 % (ref 0–3)
ERYTHROCYTE [DISTWIDTH] IN BLOOD BY AUTOMATED COUNT: 16.3 % (ref 11.5–14.5)
GFR SERPLBLD BASED ON 1.73 SQ M-ARVRAT: 35.7 ML/MIN
GLUCOSE SERPL-MCNC: 203 MG/DL (ref 70–99)
HCT VFR BLD CALC: 29.8 % (ref 36–47)
HGB BLD-MCNC: 9.9 G/DL (ref 12–15.5)
LYMPHOCYTES # BLD: 1.4 X10^3/UL (ref 1–4.8)
LYMPHOCYTES NFR BLD AUTO: 18 % (ref 24–48)
MCH RBC QN AUTO: 29 PG (ref 25–35)
MCHC RBC AUTO-ENTMCNC: 33 G/DL (ref 31–37)
MCV RBC AUTO: 87 FL (ref 79–100)
MONO #: 1.1 X10^3/UL (ref 0–1.1)
MONOCYTES NFR BLD: 14 % (ref 0–9)
NEUT #: 4.9 X10^3/UL (ref 1.8–7.7)
NEUTROPHILS NFR BLD AUTO: 64 % (ref 31–73)
PLATELET # BLD AUTO: 252 X10^3/UL (ref 140–400)
POTASSIUM SERPL-SCNC: 4.2 MMOL/L (ref 3.5–5.1)
RBC # BLD AUTO: 3.44 X10^6/UL (ref 3.5–5.4)
SODIUM SERPL-SCNC: 134 MMOL/L (ref 136–145)
WBC # BLD AUTO: 7.7 X10^3/UL (ref 4–11)

## 2021-09-10 RX ADMIN — DORZOLAMIDE HYDROCHLORIDE SCH DROP: 20 SOLUTION/ DROPS OPHTHALMIC at 10:26

## 2021-09-10 RX ADMIN — ATORVASTATIN CALCIUM SCH MG: 10 TABLET, FILM COATED ORAL at 20:06

## 2021-09-10 RX ADMIN — IPRATROPIUM BROMIDE AND ALBUTEROL SULFATE SCH ML: .5; 3 SOLUTION RESPIRATORY (INHALATION) at 06:09

## 2021-09-10 RX ADMIN — IPRATROPIUM BROMIDE AND ALBUTEROL SULFATE SCH ML: .5; 3 SOLUTION RESPIRATORY (INHALATION) at 15:44

## 2021-09-10 RX ADMIN — IPRATROPIUM BROMIDE AND ALBUTEROL SULFATE SCH ML: .5; 3 SOLUTION RESPIRATORY (INHALATION) at 18:26

## 2021-09-10 RX ADMIN — LOSARTAN POTASSIUM SCH MG: 25 TABLET, FILM COATED ORAL at 10:26

## 2021-09-10 RX ADMIN — MORPHINE SULFATE PRN MG: 2 INJECTION, SOLUTION INTRAMUSCULAR; INTRAVENOUS at 20:00

## 2021-09-10 RX ADMIN — DORZOLAMIDE HYDROCHLORIDE SCH DROP: 20 SOLUTION/ DROPS OPHTHALMIC at 20:06

## 2021-09-10 RX ADMIN — INSULIN LISPRO SCH UNITS: 100 INJECTION, SOLUTION INTRAVENOUS; SUBCUTANEOUS at 08:00

## 2021-09-10 RX ADMIN — INSULIN LISPRO SCH UNITS: 100 INJECTION, SOLUTION INTRAVENOUS; SUBCUTANEOUS at 12:00

## 2021-09-10 RX ADMIN — INSULIN LISPRO SCH UNITS: 100 INJECTION, SOLUTION INTRAVENOUS; SUBCUTANEOUS at 17:00

## 2021-09-10 RX ADMIN — MORPHINE SULFATE PRN MG: 2 INJECTION, SOLUTION INTRAMUSCULAR; INTRAVENOUS at 00:06

## 2021-09-10 RX ADMIN — MONTELUKAST SODIUM SCH MG: 10 TABLET, FILM COATED ORAL at 20:06

## 2021-09-10 RX ADMIN — LATANOPROST SCH DROP: 50 SOLUTION OPHTHALMIC at 20:06

## 2021-09-10 RX ADMIN — APIXABAN SCH MG: 5 TABLET, FILM COATED ORAL at 20:06

## 2021-09-10 RX ADMIN — INSULIN GLARGINE SCH UNIT: 100 INJECTION, SOLUTION SUBCUTANEOUS at 20:12

## 2021-09-10 RX ADMIN — IPRATROPIUM BROMIDE AND ALBUTEROL SULFATE SCH ML: .5; 3 SOLUTION RESPIRATORY (INHALATION) at 19:06

## 2021-09-10 RX ADMIN — BACITRACIN SCH MLS/HR: 5000 INJECTION, POWDER, FOR SOLUTION INTRAMUSCULAR at 15:45

## 2021-09-10 RX ADMIN — MORPHINE SULFATE PRN MG: 2 INJECTION, SOLUTION INTRAMUSCULAR; INTRAVENOUS at 05:52

## 2021-09-10 RX ADMIN — TIMOLOL MALEATE SCH DROP: 2.5 SOLUTION OPHTHALMIC at 10:26

## 2021-09-10 RX ADMIN — IPRATROPIUM BROMIDE AND ALBUTEROL SULFATE SCH ML: .5; 3 SOLUTION RESPIRATORY (INHALATION) at 10:55

## 2021-09-10 RX ADMIN — HYDROCODONE BITARTRATE AND ACETAMINOPHEN PRN TAB: 5; 325 TABLET ORAL at 19:20

## 2021-09-10 RX ADMIN — TIMOLOL MALEATE SCH DROP: 2.5 SOLUTION OPHTHALMIC at 20:06

## 2021-09-10 RX ADMIN — TRIAMTERENE AND HYDROCHLOROTHIAZIDE SCH TAB: 37.5; 25 TABLET ORAL at 10:25

## 2021-09-10 NOTE — PDOC
[facility] [facility] PROGRESS NOTE


Date of Service


DOS:


DATE: 9/10/21 


TIME: 12:08





Chief Complaint


Chief Complaint


Fall with left hip fracture. 


Hypertension.


Arthritis.


Hyperlipidemia. 


Polypharmacy. 


Asthma.


Chronic Anticoagulation.


Glaucoma


Diabetes





History of Present Illness


History of Present Illness


9/9: Mrs. Hood was seen and evaluated this morning while in her room at 

bedside. We reviewed her chart and discussed her current disposition with her 

nurse. She is currently receiving 3L of Oxygen. Dr. Ordonez plans to operate 

this afternoon.


9/10: Mrs. Hood was evaluated and seen this morning while in her room, 

sitting up. We reviewed her chart and we discussed her current disposition with 

her nurse. She is post-op day 1 and her wound is dry, intact and clear.





Vitals/I&O


Vitals/I&O:





                                   Vital Signs








  Date Time  Temp Pulse Resp B/P (MAP) Pulse Ox O2 Delivery O2 Flow Rate FiO2


 


9/10/21 11:02 98.5 68 18 114/55 (74) 95 Nasal Cannula 5.0 





 98.5       














                                    I & O   


 


 9/9/21 9/9/21 9/10/21





 15:00 23:00 07:00


 


Intake Total  590 ml 360 ml


 


Output Total  750 ml 450 ml


 


Balance  -160 ml -90 ml











Physical Exam


General:  Alert, Oriented X3, Cooperative


Heart:  Regular rate, No murmurs


Lungs:  Clear


Abdomen:  Normal bowel sounds


Extremities:  No clubbing


Skin:  No rashes





Labs


Labs:





Laboratory Tests








Test


 9/9/21


16:31 9/9/21


18:40 9/9/21


20:58 9/10/21


07:42


 


Glucose (Fingerstick)


 93 mg/dL


(70-99) 86 mg/dL


(70-99) 100 mg/dL


(70-99) 68 mg/dL


(70-99)


 


Test


 9/10/21


11:25 


 


 





 


Glucose (Fingerstick)


 108 mg/dL


(70-99) 


 


 














Review of Systems


Review of Systems:


No headache.


No vomiting.





Assessment and Plan


Assessmemt and Plan


Assessment:


1. Fall with left hip fracture. 


2. Hypertension


3. Hyperlipidemia


4. Arthritis. 


5. Polypharmacy.


6. Asthma.


7. Chronic Anticoagulation. 


8. Glaucoma.


9. Diabetes. 





Plan:


1. Continue Wound Care


2. Full Code


3. PT/OT


4. Trend labs


5. Probably discharge in a couple of days.





Comment


Review of Relevant


I have reviewed the following items ellie (where applicable) has been applied.


Medications:





Current Medications








 Medications


  (Trade)  Dose


 Ordered  Sig/Surendra


 Route


 PRN Reason  Start Time


 Stop Time Status Last Admin


Dose Admin


 


 Fentanyl Citrate


  (Fentanyl 2ml


 Vial)  25 mcg  PRN Q5MIN  PRN


 IVP


 MILD PAIN 1-3  9/9/21 16:45


 9/9/21 22:00 DC 9/9/21 19:02





 


 Sodium Chloride  1,000 ml @ 


 30 mls/hr  Q24H


 IV


   9/9/21 16:45


 9/10/21 04:44 DC 9/9/21 16:45





 


 Clindamycin


 Phosphate  50 ml @ 


 100 mls/hr  1X PREOP  PRN


 IV


 PRIOR TO PROCEDURE  9/9/21 18:00


 9/10/21 10:37 DC 9/9/21 17:36





 


 Acetaminophen


  (Tylenol)  650 mg  PRN Q6HRS  PRN


 PO


 MILD PAIN / TEMP > 100.3'F  9/9/21 18:45


    9/9/21 21:22














Justifications for Admission


Other Justification














ELVA MILNER III DO           Sep 10, 2021 12:12

## 2021-09-10 NOTE — NUR
SW following. Discussed with RN, pt from [facility], 5L, NPO. Pt had surgery on 9/9/21. 
PT/OT to start today. COVID-19 negative at [facility]. Updates faxed to [facility]. MARY will 
continue to follow.

## 2021-09-10 NOTE — CONS
DATE OF CONSULTATION: 09/09/2021

REASON FOR CONSULTATION:  Right hip fracture.



HISTORY OF PRESENT ILLNESS:  The patient is an 86-year-old female who was 

ambulating and sustained a trip and fall type of injury.  After that fall, she 

was able to actually ambulate, but with significant pain in the right hip 

region.  Therefore, she was brought to the hospital here at [facility] where 

x-rays were obtained.  She was noted to have an impacted subcapital fracture of 

the right hip.  Actually only mild to moderate pain at this point as long as she

was in a stable position.  At this point, she has no other injuries, which 

occurred thankfully.



Past medical and surgical history, medications and allergies were reviewed.



PHYSICAL EXAMINATION: Today was limited due to the known impacted fracture; 

however, rotation was uncomfortable.  There was no significant shortening or 

externally rotating of the lower extremity.  At this point, the distal 

neurovascular status is fully intact.



DIAGNOSTIC STUDIES:  X-rays reveal an impacted fracture, subcapital of right 

hip.



PLAN:  At this time, I have talked with the family at length about the diagnosis

and the treatment plan. I believe due to the impacted nature of this fracture, 

it is somewhat stable at this point and in fact we have a very high possibility 

of this healing with percutaneous pinning and after a long discussion of this 

versus hemiarthroplasty and the risks and benefits of both as far as nonunion, 

they wished to proceed with the percutaneous pinning after the open reduction.  

We will proceed as soon as she is medically cleared.  She understands all this.







MARIO/JEEVAN/DEDE

DR: Violeta   DD: 09/09/2021 18:30

DT: 09/09/2021 20:07   TID: [id number]

## 2021-09-11 VITALS — DIASTOLIC BLOOD PRESSURE: 33 MMHG | SYSTOLIC BLOOD PRESSURE: 103 MMHG

## 2021-09-11 VITALS — DIASTOLIC BLOOD PRESSURE: 55 MMHG | SYSTOLIC BLOOD PRESSURE: 112 MMHG

## 2021-09-11 VITALS — DIASTOLIC BLOOD PRESSURE: 48 MMHG | SYSTOLIC BLOOD PRESSURE: 140 MMHG

## 2021-09-11 VITALS — SYSTOLIC BLOOD PRESSURE: 137 MMHG | DIASTOLIC BLOOD PRESSURE: 47 MMHG

## 2021-09-11 VITALS — SYSTOLIC BLOOD PRESSURE: 107 MMHG | DIASTOLIC BLOOD PRESSURE: 41 MMHG

## 2021-09-11 RX ADMIN — TIMOLOL MALEATE SCH DROP: 2.5 SOLUTION OPHTHALMIC at 09:16

## 2021-09-11 RX ADMIN — HYDROCODONE BITARTRATE AND ACETAMINOPHEN PRN TAB: 5; 325 TABLET ORAL at 15:02

## 2021-09-11 RX ADMIN — TIMOLOL MALEATE SCH DROP: 2.5 SOLUTION OPHTHALMIC at 21:53

## 2021-09-11 RX ADMIN — INSULIN GLARGINE SCH UNIT: 100 INJECTION, SOLUTION SUBCUTANEOUS at 21:55

## 2021-09-11 RX ADMIN — BACITRACIN SCH MLS/HR: 5000 INJECTION, POWDER, FOR SOLUTION INTRAMUSCULAR at 12:29

## 2021-09-11 RX ADMIN — DORZOLAMIDE HYDROCHLORIDE SCH DROP: 20 SOLUTION/ DROPS OPHTHALMIC at 21:53

## 2021-09-11 RX ADMIN — ATORVASTATIN CALCIUM SCH MG: 10 TABLET, FILM COATED ORAL at 21:54

## 2021-09-11 RX ADMIN — Medication SCH CAP: at 21:54

## 2021-09-11 RX ADMIN — TRIAMTERENE AND HYDROCHLOROTHIAZIDE SCH TAB: 37.5; 25 TABLET ORAL at 09:17

## 2021-09-11 RX ADMIN — IPRATROPIUM BROMIDE AND ALBUTEROL SULFATE SCH ML: .5; 3 SOLUTION RESPIRATORY (INHALATION) at 07:08

## 2021-09-11 RX ADMIN — APIXABAN SCH MG: 5 TABLET, FILM COATED ORAL at 21:54

## 2021-09-11 RX ADMIN — IPRATROPIUM BROMIDE AND ALBUTEROL SULFATE SCH ML: .5; 3 SOLUTION RESPIRATORY (INHALATION) at 11:25

## 2021-09-11 RX ADMIN — APIXABAN SCH MG: 5 TABLET, FILM COATED ORAL at 09:17

## 2021-09-11 RX ADMIN — INSULIN LISPRO SCH UNITS: 100 INJECTION, SOLUTION INTRAVENOUS; SUBCUTANEOUS at 12:25

## 2021-09-11 RX ADMIN — ACETAMINOPHEN PRN MG: 325 TABLET, FILM COATED ORAL at 21:57

## 2021-09-11 RX ADMIN — INSULIN LISPRO SCH UNITS: 100 INJECTION, SOLUTION INTRAVENOUS; SUBCUTANEOUS at 17:00

## 2021-09-11 RX ADMIN — HYDROCODONE BITARTRATE AND ACETAMINOPHEN PRN TAB: 5; 325 TABLET ORAL at 16:45

## 2021-09-11 RX ADMIN — IPRATROPIUM BROMIDE AND ALBUTEROL SULFATE SCH ML: .5; 3 SOLUTION RESPIRATORY (INHALATION) at 14:55

## 2021-09-11 RX ADMIN — INSULIN LISPRO SCH UNITS: 100 INJECTION, SOLUTION INTRAVENOUS; SUBCUTANEOUS at 08:00

## 2021-09-11 RX ADMIN — DORZOLAMIDE HYDROCHLORIDE SCH DROP: 20 SOLUTION/ DROPS OPHTHALMIC at 09:18

## 2021-09-11 RX ADMIN — LATANOPROST SCH DROP: 50 SOLUTION OPHTHALMIC at 21:53

## 2021-09-11 RX ADMIN — MONTELUKAST SODIUM SCH MG: 10 TABLET, FILM COATED ORAL at 21:54

## 2021-09-11 RX ADMIN — LOSARTAN POTASSIUM SCH MG: 25 TABLET, FILM COATED ORAL at 09:17

## 2021-09-11 RX ADMIN — IPRATROPIUM BROMIDE AND ALBUTEROL SULFATE SCH ML: .5; 3 SOLUTION RESPIRATORY (INHALATION) at 18:20

## 2021-09-11 NOTE — PDOC
[facility] [facility] PROGRESS NOTE


Date of Service


DOS:


DATE: 9/11/21 


TIME: 10:50





Chief Complaint


Chief Complaint


Fall with right hip fracture. 


Hypertension.


Hyperlipidemia


Arthritis.


Polypharmacy. 


Asthma.


Chronic Anticoagulation.


Glaucoma


Diabetes





History of Present Illness


History of Present Illness


9/9: Mrs. Hood was seen and evaluated this morning while in her room at 

bedside. We reviewed her chart and discussed her current disposition with her 

nurse. She is currently receiving 3L of Oxygen. Dr. Ordonez plans to operate 

this afternoon.


9/10: Mrs. Hood was evaluated and seen this morning while in her room, 

sitting up. We reviewed her chart and we discussed her current disposition with 

her nurse. She is post-op day 1 and her wound is dry, intact and clear. 


9/11: Mrs. Hood was seen and evaluated this morning in her room. She was 

sitting up and eating her breakfast. Overall, she appears to be doing well, and 

her wound was dry, intact and clear. She is post-op day 2. We discussed her 

current disposition with her nurse and reviewed her chart. She is currently on 

5L of Oxygen.





Vitals/I&O


Vitals/I&O:





                                   Vital Signs








  Date Time  Temp Pulse Resp B/P (MAP) Pulse Ox O2 Delivery O2 Flow Rate FiO2


 


9/11/21 09:18  67  112/55    


 


9/11/21 07:08      Nasal Cannula 5.0 


 


9/11/21 07:00 97.7  16  96   





 97.7       














                                    I & O   


 


 9/10/21 9/10/21 9/11/21





 15:00 23:00 07:00


 


Intake Total 520 ml 240 ml 


 


Output Total  700 ml 550 ml


 


Balance 520 ml -460 ml -550 ml











Physical Exam


General:  Alert, Oriented X3, Cooperative


Heart:  Regular rate, No murmurs


Lungs:  Clear


Abdomen:  Normal bowel sounds


Extremities:  No clubbing


Skin:  No rashes





Labs


Labs:





Laboratory Tests








Test


 9/10/21


11:25 9/10/21


15:05 9/10/21


16:40 9/10/21


16:45


 


Glucose (Fingerstick)


 108 mg/dL


(70-99) 


 


 188 mg/dL


(70-99)


 


White Blood Count


 


 7.7 x10^3/uL


(4.0-11.0) 


 





 


Red Blood Count


 


 3.44 x10^6/uL


(3.50-5.40) 


 





 


Hemoglobin


 


 9.9 g/dL


(12.0-15.5) 


 





 


Hematocrit


 


 29.8 %


(36.0-47.0) 


 





 


Mean Corpuscular Volume  87 fL ()   


 


Mean Corpuscular Hemoglobin  29 pg (25-35)   


 


Mean Corpuscular Hemoglobin


Concent 


 33 g/dL


(31-37) 


 





 


Red Cell Distribution Width


 


 16.3 %


(11.5-14.5) 


 





 


Platelet Count


 


 252 x10^3/uL


(140-400) 


 





 


Neutrophils (%) (Auto)  64 % (31-73)   


 


Lymphocytes (%) (Auto)  18 % (24-48)   


 


Monocytes (%) (Auto)  14 % (0-9)   


 


Eosinophils (%) (Auto)  3 % (0-3)   


 


Basophils (%) (Auto)  0 % (0-3)   


 


Neutrophils # (Auto)


 


 4.9 x10^3/uL


(1.8-7.7) 


 





 


Lymphocytes # (Auto)


 


 1.4 x10^3/uL


(1.0-4.8) 


 





 


Monocytes # (Auto)


 


 1.1 x10^3/uL


(0.0-1.1) 


 





 


Eosinophils # (Auto)


 


 0.2 x10^3/uL


(0.0-0.7) 


 





 


Basophils # (Auto)


 


 0.0 x10^3/uL


(0.0-0.2) 


 





 


Sodium Level


 


 134 mmol/L


(136-145) 


 





 


Potassium Level


 


 4.2 mmol/L


(3.5-5.1) 


 





 


Chloride Level


 


 98 mmol/L


() 


 





 


Carbon Dioxide Level


 


 28 mmol/L


(21-32) 


 





 


Anion Gap  8 (6-14)   


 


Blood Urea Nitrogen


 


 20 mg/dL


(7-20) 


 





 


Creatinine


 


 1.4 mg/dL


(0.6-1.0) 


 





 


Estimated GFR


(Cockcroft-Gault) 


 35.7 


 


 





 


Glucose Level


 


 203 mg/dL


(70-99) 


 





 


Calcium Level


 


 8.5 mg/dL


(8.5-10.1) 


 





 


Lactic Acid Level


 


 


 0.7 mmol/L


(0.4-2.0) 





 


Test


 9/10/21


20:04 9/11/21


08:26 


 





 


Glucose (Fingerstick)


 253 mg/dL


(70-99) 106 mg/dL


(70-99) 


 














Review of Systems


Review of Systems:


No vomiting


No headache





Assessment and Plan


Assessmemt and Plan


Assessment:


1. Fall with right hip fracture. 


2. Hypertension.


3. Hyperlipidemia


4. Arthritis.


5. Polypharmacy. 


6. Asthma.


7. Chronic Anticoagulation.


8. Glaucoma


9. Diabetes





Plan:


1. Continue wound care


2. Continue DVT Prophylaxis (Eliquis)


3. Full Code


4. Home Medications


5. PT/OT


6. Trend labs.


7. Discharge/Disposition pending.





Comment


Review of Relevant


I have reviewed the following items ellie (where applicable) has been applied.


Medications:





Current Medications








 Medications


  (Trade)  Dose


 Ordered  Sig/Surendra


 Route


 PRN Reason  Start Time


 Stop Time Status Last Admin


Dose Admin


 


 Acetaminophen/


 Hydrocodone Bitart


  (Lortab 5/325)  1 tab  PRN Q4HRS  PRN


 PO


 MODERATE PAIN  9/10/21 13:30


    9/10/21 19:20





 


 Acetaminophen/


 Hydrocodone Bitart


  (Lortab 5/325)  2 tab  PRN Q4HRS  PRN


 PO


 SEVERE PAIN  9/10/21 13:30


    9/11/21 00:17





 


 Levofloxacin/


 Dextrose  100 ml @ 


 100 mls/hr  1X  ONCE


 IV


   9/10/21 16:00


 9/10/21 16:59 DC 9/10/21 16:00





 


 Apixaban


  (Eliquis)  5 mg  BID


 PO


   9/10/21 21:00


    9/11/21 09:17





 


 Diltiazem HCl


  (Cardizem 24hr


 Cd)  180 mg  DAILY


 PO


   9/11/21 09:00


    9/11/21 09:18














Justifications for Admission


Other Justification














ELVA MILNER III DO           Sep 11, 2021 10:55

## 2021-09-11 NOTE — NUR
at 1500 pt stated a pain level 9/10. 1 Loratab was given.  at 1630 pt stated pain still at 
9/10. addition 1 loratab was given.

## 2021-09-12 VITALS — SYSTOLIC BLOOD PRESSURE: 112 MMHG | DIASTOLIC BLOOD PRESSURE: 52 MMHG

## 2021-09-12 VITALS — SYSTOLIC BLOOD PRESSURE: 135 MMHG | DIASTOLIC BLOOD PRESSURE: 51 MMHG

## 2021-09-12 VITALS — DIASTOLIC BLOOD PRESSURE: 68 MMHG | SYSTOLIC BLOOD PRESSURE: 118 MMHG

## 2021-09-12 VITALS — SYSTOLIC BLOOD PRESSURE: 113 MMHG | DIASTOLIC BLOOD PRESSURE: 62 MMHG

## 2021-09-12 VITALS — SYSTOLIC BLOOD PRESSURE: 109 MMHG | DIASTOLIC BLOOD PRESSURE: 44 MMHG

## 2021-09-12 VITALS — SYSTOLIC BLOOD PRESSURE: 131 MMHG | DIASTOLIC BLOOD PRESSURE: 57 MMHG

## 2021-09-12 LAB
ANION GAP SERPL CALC-SCNC: 7 MMOL/L (ref 6–14)
BASOPHILS # BLD AUTO: 0 X10^3/UL (ref 0–0.2)
BASOPHILS NFR BLD: 1 % (ref 0–3)
BUN SERPL-MCNC: 15 MG/DL (ref 7–20)
CALCIUM SERPL-MCNC: 8.9 MG/DL (ref 8.5–10.1)
CHLORIDE SERPL-SCNC: 100 MMOL/L (ref 98–107)
CO2 SERPL-SCNC: 29 MMOL/L (ref 21–32)
CREAT SERPL-MCNC: 1.2 MG/DL (ref 0.6–1)
EOSINOPHIL NFR BLD: 0.2 X10^3/UL (ref 0–0.7)
EOSINOPHIL NFR BLD: 4 % (ref 0–3)
ERYTHROCYTE [DISTWIDTH] IN BLOOD BY AUTOMATED COUNT: 16.2 % (ref 11.5–14.5)
GFR SERPLBLD BASED ON 1.73 SQ M-ARVRAT: 42.6 ML/MIN
GLUCOSE SERPL-MCNC: 108 MG/DL (ref 70–99)
HCT VFR BLD CALC: 31.2 % (ref 36–47)
HGB BLD-MCNC: 10.5 G/DL (ref 12–15.5)
LYMPHOCYTES # BLD: 1.1 X10^3/UL (ref 1–4.8)
LYMPHOCYTES NFR BLD AUTO: 16 % (ref 24–48)
MCH RBC QN AUTO: 29 PG (ref 25–35)
MCHC RBC AUTO-ENTMCNC: 34 G/DL (ref 31–37)
MCV RBC AUTO: 87 FL (ref 79–100)
MONO #: 1.1 X10^3/UL (ref 0–1.1)
MONOCYTES NFR BLD: 16 % (ref 0–9)
NEUT #: 4.4 X10^3/UL (ref 1.8–7.7)
NEUTROPHILS NFR BLD AUTO: 64 % (ref 31–73)
PLATELET # BLD AUTO: 300 X10^3/UL (ref 140–400)
POTASSIUM SERPL-SCNC: 3.7 MMOL/L (ref 3.5–5.1)
RBC # BLD AUTO: 3.61 X10^6/UL (ref 3.5–5.4)
SODIUM SERPL-SCNC: 136 MMOL/L (ref 136–145)
WBC # BLD AUTO: 7 X10^3/UL (ref 4–11)

## 2021-09-12 RX ADMIN — TIMOLOL MALEATE SCH DROP: 2.5 SOLUTION OPHTHALMIC at 21:03

## 2021-09-12 RX ADMIN — LATANOPROST SCH DROP: 50 SOLUTION OPHTHALMIC at 21:03

## 2021-09-12 RX ADMIN — IPRATROPIUM BROMIDE AND ALBUTEROL SULFATE SCH ML: .5; 3 SOLUTION RESPIRATORY (INHALATION) at 17:11

## 2021-09-12 RX ADMIN — DORZOLAMIDE HYDROCHLORIDE SCH DROP: 20 SOLUTION/ DROPS OPHTHALMIC at 09:10

## 2021-09-12 RX ADMIN — LOSARTAN POTASSIUM SCH MG: 25 TABLET, FILM COATED ORAL at 09:03

## 2021-09-12 RX ADMIN — ACETAMINOPHEN PRN MG: 325 TABLET, FILM COATED ORAL at 12:29

## 2021-09-12 RX ADMIN — ACETAMINOPHEN PRN MG: 325 TABLET, FILM COATED ORAL at 05:01

## 2021-09-12 RX ADMIN — TIMOLOL MALEATE SCH DROP: 2.5 SOLUTION OPHTHALMIC at 09:10

## 2021-09-12 RX ADMIN — INSULIN GLARGINE SCH UNIT: 100 INJECTION, SOLUTION SUBCUTANEOUS at 21:00

## 2021-09-12 RX ADMIN — TRIAMTERENE AND HYDROCHLOROTHIAZIDE SCH TAB: 37.5; 25 TABLET ORAL at 09:03

## 2021-09-12 RX ADMIN — IPRATROPIUM BROMIDE AND ALBUTEROL SULFATE SCH ML: .5; 3 SOLUTION RESPIRATORY (INHALATION) at 09:15

## 2021-09-12 RX ADMIN — BACITRACIN SCH MLS/HR: 5000 INJECTION, POWDER, FOR SOLUTION INTRAMUSCULAR at 09:04

## 2021-09-12 RX ADMIN — INSULIN LISPRO SCH UNITS: 100 INJECTION, SOLUTION INTRAVENOUS; SUBCUTANEOUS at 17:51

## 2021-09-12 RX ADMIN — ATORVASTATIN CALCIUM SCH MG: 10 TABLET, FILM COATED ORAL at 20:53

## 2021-09-12 RX ADMIN — DORZOLAMIDE HYDROCHLORIDE SCH DROP: 20 SOLUTION/ DROPS OPHTHALMIC at 21:04

## 2021-09-12 RX ADMIN — IPRATROPIUM BROMIDE AND ALBUTEROL SULFATE SCH ML: .5; 3 SOLUTION RESPIRATORY (INHALATION) at 11:19

## 2021-09-12 RX ADMIN — MONTELUKAST SODIUM SCH MG: 10 TABLET, FILM COATED ORAL at 20:53

## 2021-09-12 RX ADMIN — Medication SCH CAP: at 09:03

## 2021-09-12 RX ADMIN — APIXABAN SCH MG: 5 TABLET, FILM COATED ORAL at 09:03

## 2021-09-12 RX ADMIN — INSULIN LISPRO SCH UNITS: 100 INJECTION, SOLUTION INTRAVENOUS; SUBCUTANEOUS at 12:00

## 2021-09-12 RX ADMIN — IPRATROPIUM BROMIDE AND ALBUTEROL SULFATE SCH ML: .5; 3 SOLUTION RESPIRATORY (INHALATION) at 21:21

## 2021-09-12 RX ADMIN — APIXABAN SCH MG: 5 TABLET, FILM COATED ORAL at 20:53

## 2021-09-12 RX ADMIN — Medication SCH CAP: at 20:53

## 2021-09-12 RX ADMIN — INSULIN LISPRO SCH UNITS: 100 INJECTION, SOLUTION INTRAVENOUS; SUBCUTANEOUS at 07:18

## 2021-09-12 NOTE — PN
DATE: 09/12/2021

The patient underwent a fixation of her hip fracture.  The incision is healing 

well without any signs or symptoms of infection.  There has been no significant 

drainage every day that she is seen over the last two days.  She has had 

decreasing amounts of need for pain medication due to pain control at this 

point.  She had difficulty with ambulation day 1.  On day 2, she is up and 

around trying to maintain her weightbearing status; however, no signs or 

symptoms of infection and no signs or symptoms of DVT.  She seems to be doing 

very well at this point.







PAVAN

DR: Violeta   DD: 09/12/2021 09:37

DT: 09/12/2021 09:53   TID: [id number]

## 2021-09-12 NOTE — PDOC
[employer] PROGRESS NOTE


Date of Service


DOS:


DATE: 9/12/21 


TIME: 10:59





Chief Complaint


Chief Complaint


Fall with right hip fracture.


Hyperlipidemia


Hypertension


Arthritis


Polypharmacy


Chronic Anticoagulation


Asthma


Glaucoma


Diabetes





History of Present Illness


History of Present Illness


9/9: Mrs. Hood was seen and evaluated this morning while in her room at 

bedside. We reviewed her chart and discussed her current disposition with her 

nurse. She is currently receiving 3L of Oxygen. Dr. Ordonez plans to operate 

this afternoon.


9/10: Mrs. Hood was evaluated and seen this morning while in her room, 

sitting up. We reviewed her chart and we discussed her current disposition with 

her nurse. She is post-op day 1 and her wound is dry, intact and clear. 


9/11: Mrs. Hood was seen and evaluated this morning in her room. She was 

sitting up and eating her breakfast. Overall, she appears to be doing well, and 

her wound was dry, intact and clear. She is post-op day 2. We discussed her 

current disposition with her nurse and reviewed her chart. She is currently on 

5L of Oxygen. 


9/12: Mrs. Hood was evaluated and examined this morning in her room. She was 

sitting up and was able to eat her breakfast this morning. Her wound was intact 

and dry. Last night she did have some pain and Lortab was given to manage this. 

We will consult wound care for her coccygeal pain. We discussed her current 

disposition with her nurse and reviewed her chart. I also spoke with her 

about our plan for his wife's care.





Vitals/I&O


Vitals/I&O:





                                   Vital Signs








  Date Time  Temp Pulse Resp B/P (MAP) Pulse Ox O2 Delivery O2 Flow Rate FiO2


 


9/12/21 09:17     99 Nasal Cannula 2.0 


 


9/12/21 09:03  72  109/44    


 


9/12/21 07:00 98.6  18     





 98.6       














                                    I & O   


 


 9/11/21 9/11/21 9/12/21





 15:00 23:00 07:00


 


Intake Total 500 ml 300 ml 


 


Output Total  750 ml 900 ml


 


Balance 500 ml -450 ml -900 ml











Physical Exam


General:  Alert, Oriented X3, Cooperative


Heart:  Regular rate, No murmurs


Lungs:  Clear


Abdomen:  Normal bowel sounds


Extremities:  No clubbing


Skin:  No rashes





Labs


Labs:





Laboratory Tests








Test


 9/11/21


11:49 9/11/21


16:47 9/11/21


20:27 9/12/21


06:38


 


Glucose (Fingerstick)


 188 mg/dL


(70-99) 164 mg/dL


(70-99) 125 mg/dL


(70-99) 57 mg/dL


(70-99)


 


Test


 9/12/21


07:30 9/12/21


07:51 


 





 


White Blood Count


 7.0 x10^3/uL


(4.0-11.0) 


 


 





 


Red Blood Count


 3.61 x10^6/uL


(3.50-5.40) 


 


 





 


Hemoglobin


 10.5 g/dL


(12.0-15.5) 


 


 





 


Hematocrit


 31.2 %


(36.0-47.0) 


 


 





 


Mean Corpuscular Volume 87 fL ()    


 


Mean Corpuscular Hemoglobin 29 pg (25-35)    


 


Mean Corpuscular Hemoglobin


Concent 34 g/dL


(31-37) 


 


 





 


Red Cell Distribution Width


 16.2 %


(11.5-14.5) 


 


 





 


Platelet Count


 300 x10^3/uL


(140-400) 


 


 





 


Neutrophils (%) (Auto) 64 % (31-73)    


 


Lymphocytes (%) (Auto) 16 % (24-48)    


 


Monocytes (%) (Auto) 16 % (0-9)    


 


Eosinophils (%) (Auto) 4 % (0-3)    


 


Basophils (%) (Auto) 1 % (0-3)    


 


Neutrophils # (Auto)


 4.4 x10^3/uL


(1.8-7.7) 


 


 





 


Lymphocytes # (Auto)


 1.1 x10^3/uL


(1.0-4.8) 


 


 





 


Monocytes # (Auto)


 1.1 x10^3/uL


(0.0-1.1) 


 


 





 


Eosinophils # (Auto)


 0.2 x10^3/uL


(0.0-0.7) 


 


 





 


Basophils # (Auto)


 0.0 x10^3/uL


(0.0-0.2) 


 


 





 


Sodium Level


 136 mmol/L


(136-145) 


 


 





 


Potassium Level


 3.7 mmol/L


(3.5-5.1) 


 


 





 


Chloride Level


 100 mmol/L


() 


 


 





 


Carbon Dioxide Level


 29 mmol/L


(21-32) 


 


 





 


Anion Gap 7 (6-14)    


 


Blood Urea Nitrogen


 15 mg/dL


(7-20) 


 


 





 


Creatinine


 1.2 mg/dL


(0.6-1.0) 


 


 





 


Estimated GFR


(Cockcroft-Gault) 42.6 


 


 


 





 


Glucose Level


 108 mg/dL


(70-99) 


 


 





 


Calcium Level


 8.9 mg/dL


(8.5-10.1) 


 


 





 


Glucose (Fingerstick)


 


 109 mg/dL


(70-99) 


 














Review of Systems


Review of Systems:


No vomiting


No headache





Assessment and Plan


Assessmemt and Plan


Assessment:


1. Fall with right hip fracture. 


2. Hyperlipidemia


3. Hypertension


4. Polypharmacy


5. Arthritis 


6. Asthma.


7. Chronic Anticoagulation.


8. Glaucoma


9. Diabetes





Plan:


1. Consult with Wound Care for coccygeal pain.


2. Continue DVT Prophylaxis (Eliquis)


3. Full Code.


4. Home medications.


5. PT/OT


6. Trend labs.


7. Discharge/Disposition pending ([facility])





Comment


Review of Relevant


I have reviewed the following items ellie (where applicable) has been applied.


Medications:





Current Medications








 Medications


  (Trade)  Dose


 Ordered  Sig/Surendra


 Route


 PRN Reason  Start Time


 Stop Time Status Last Admin


Dose Admin


 


 Levofloxacin/


 Dextrose  50 ml @ 50


 mls/hr  Q24H


 IV


   9/11/21 16:00


    9/11/21 12:49





 


 Lactobacillus


 Rhamnosus


  (Culturelle)  1 cap  BID


 PO


   9/11/21 21:00


    9/12/21 09:03














Justifications for Admission


Other Justification














ELVA MILNER III DO           Sep 12, 2021 11:14

## 2021-09-13 VITALS — DIASTOLIC BLOOD PRESSURE: 49 MMHG | SYSTOLIC BLOOD PRESSURE: 115 MMHG

## 2021-09-13 VITALS — DIASTOLIC BLOOD PRESSURE: 65 MMHG | SYSTOLIC BLOOD PRESSURE: 124 MMHG

## 2021-09-13 VITALS — DIASTOLIC BLOOD PRESSURE: 66 MMHG | SYSTOLIC BLOOD PRESSURE: 134 MMHG

## 2021-09-13 VITALS — DIASTOLIC BLOOD PRESSURE: 59 MMHG | SYSTOLIC BLOOD PRESSURE: 122 MMHG

## 2021-09-13 LAB
ANION GAP SERPL CALC-SCNC: 8 MMOL/L (ref 6–14)
BASOPHILS # BLD AUTO: 0.1 X10^3/UL (ref 0–0.2)
BASOPHILS NFR BLD: 1 % (ref 0–3)
BUN SERPL-MCNC: 17 MG/DL (ref 7–20)
CALCIUM SERPL-MCNC: 9.1 MG/DL (ref 8.5–10.1)
CHLORIDE SERPL-SCNC: 97 MMOL/L (ref 98–107)
CO2 SERPL-SCNC: 27 MMOL/L (ref 21–32)
CREAT SERPL-MCNC: 1.4 MG/DL (ref 0.6–1)
EOSINOPHIL NFR BLD: 0 % (ref 0–3)
EOSINOPHIL NFR BLD: 0 X10^3/UL (ref 0–0.7)
ERYTHROCYTE [DISTWIDTH] IN BLOOD BY AUTOMATED COUNT: 16.1 % (ref 11.5–14.5)
GFR SERPLBLD BASED ON 1.73 SQ M-ARVRAT: 35.7 ML/MIN
GLUCOSE SERPL-MCNC: 260 MG/DL (ref 70–99)
HCT VFR BLD CALC: 33.1 % (ref 36–47)
HGB BLD-MCNC: 11 G/DL (ref 12–15.5)
LYMPHOCYTES # BLD: 1.3 X10^3/UL (ref 1–4.8)
LYMPHOCYTES NFR BLD AUTO: 15 % (ref 24–48)
MCH RBC QN AUTO: 29 PG (ref 25–35)
MCHC RBC AUTO-ENTMCNC: 33 G/DL (ref 31–37)
MCV RBC AUTO: 86 FL (ref 79–100)
MONO #: 1.2 X10^3/UL (ref 0–1.1)
MONOCYTES NFR BLD: 14 % (ref 0–9)
NEUT #: 6.2 X10^3/UL (ref 1.8–7.7)
NEUTROPHILS NFR BLD AUTO: 71 % (ref 31–73)
PLATELET # BLD AUTO: 349 X10^3/UL (ref 140–400)
POTASSIUM SERPL-SCNC: 4.2 MMOL/L (ref 3.5–5.1)
RBC # BLD AUTO: 3.85 X10^6/UL (ref 3.5–5.4)
SODIUM SERPL-SCNC: 132 MMOL/L (ref 136–145)
WBC # BLD AUTO: 8.9 X10^3/UL (ref 4–11)

## 2021-09-13 RX ADMIN — APIXABAN SCH MG: 5 TABLET, FILM COATED ORAL at 08:46

## 2021-09-13 RX ADMIN — IPRATROPIUM BROMIDE AND ALBUTEROL SULFATE SCH ML: .5; 3 SOLUTION RESPIRATORY (INHALATION) at 12:00

## 2021-09-13 RX ADMIN — IPRATROPIUM BROMIDE AND ALBUTEROL SULFATE SCH ML: .5; 3 SOLUTION RESPIRATORY (INHALATION) at 07:36

## 2021-09-13 RX ADMIN — INSULIN LISPRO SCH UNITS: 100 INJECTION, SOLUTION INTRAVENOUS; SUBCUTANEOUS at 08:49

## 2021-09-13 RX ADMIN — DORZOLAMIDE HYDROCHLORIDE SCH DROP: 20 SOLUTION/ DROPS OPHTHALMIC at 08:46

## 2021-09-13 RX ADMIN — TRIAMTERENE AND HYDROCHLOROTHIAZIDE SCH TAB: 37.5; 25 TABLET ORAL at 08:46

## 2021-09-13 RX ADMIN — BACITRACIN SCH MLS/HR: 5000 INJECTION, POWDER, FOR SOLUTION INTRAMUSCULAR at 03:45

## 2021-09-13 RX ADMIN — TIMOLOL MALEATE SCH DROP: 2.5 SOLUTION OPHTHALMIC at 08:46

## 2021-09-13 RX ADMIN — Medication SCH CAP: at 08:46

## 2021-09-13 RX ADMIN — INSULIN LISPRO SCH UNITS: 100 INJECTION, SOLUTION INTRAVENOUS; SUBCUTANEOUS at 12:00

## 2021-09-13 RX ADMIN — ACETAMINOPHEN PRN MG: 325 TABLET, FILM COATED ORAL at 02:31

## 2021-09-13 RX ADMIN — LOSARTAN POTASSIUM SCH MG: 25 TABLET, FILM COATED ORAL at 08:46

## 2021-09-13 NOTE — PDOC3
Discharge Summary


Visit Information


Date of Admission:  Sep 8, 2021


Date of Discharge:  Sep 13, 2021





Brief Hospital Course


Allergies





                                    Allergies








Coded Allergies Type Severity Reaction Last Updated Verified


 


  Penicillins Allergy Intermediate  9/9/21 Yes


 


  ibuprofen Allergy Intermediate  9/9/21 Yes








Vital Signs





Vital Signs








  Date Time  Temp Pulse Resp B/P (MAP) Pulse Ox O2 Delivery O2 Flow Rate FiO2


 


9/13/21 08:46  94  134/66    


 


9/13/21 07:38     97 Nasal Cannula 4.0 


 


9/13/21 07:00 97.9  18     





 97.9       








Lab Results





Laboratory Tests








Test


 9/11/21


11:49 9/11/21


16:47 9/11/21


20:27 9/12/21


06:38


 


Glucose (Fingerstick)


 188 mg/dL


(70-99) 164 mg/dL


(70-99) 125 mg/dL


(70-99) 57 mg/dL


(70-99)


 


Test


 9/12/21


07:30 9/12/21


07:51 9/12/21


11:46 9/12/21


17:40


 


White Blood Count


 7.0 x10^3/uL


(4.0-11.0) 


 


 





 


Red Blood Count


 3.61 x10^6/uL


(3.50-5.40) 


 


 





 


Hemoglobin


 10.5 g/dL


(12.0-15.5) 


 


 





 


Hematocrit


 31.2 %


(36.0-47.0) 


 


 





 


Mean Corpuscular Volume 87 fL ()    


 


Mean Corpuscular Hemoglobin 29 pg (25-35)    


 


Mean Corpuscular Hemoglobin


Concent 34 g/dL


(31-37) 


 


 





 


Red Cell Distribution Width


 16.2 %


(11.5-14.5) 


 


 





 


Platelet Count


 300 x10^3/uL


(140-400) 


 


 





 


Neutrophils (%) (Auto) 64 % (31-73)    


 


Lymphocytes (%) (Auto) 16 % (24-48)    


 


Monocytes (%) (Auto) 16 % (0-9)    


 


Eosinophils (%) (Auto) 4 % (0-3)    


 


Basophils (%) (Auto) 1 % (0-3)    


 


Neutrophils # (Auto)


 4.4 x10^3/uL


(1.8-7.7) 


 


 





 


Lymphocytes # (Auto)


 1.1 x10^3/uL


(1.0-4.8) 


 


 





 


Monocytes # (Auto)


 1.1 x10^3/uL


(0.0-1.1) 


 


 





 


Eosinophils # (Auto)


 0.2 x10^3/uL


(0.0-0.7) 


 


 





 


Basophils # (Auto)


 0.0 x10^3/uL


(0.0-0.2) 


 


 





 


Sodium Level


 136 mmol/L


(136-145) 


 


 





 


Potassium Level


 3.7 mmol/L


(3.5-5.1) 


 


 





 


Chloride Level


 100 mmol/L


() 


 


 





 


Carbon Dioxide Level


 29 mmol/L


(21-32) 


 


 





 


Anion Gap 7 (6-14)    


 


Blood Urea Nitrogen


 15 mg/dL


(7-20) 


 


 





 


Creatinine


 1.2 mg/dL


(0.6-1.0) 


 


 





 


Estimated GFR


(Cockcroft-Gault) 42.6 


 


 


 





 


Glucose Level


 108 mg/dL


(70-99) 


 


 





 


Calcium Level


 8.9 mg/dL


(8.5-10.1) 


 


 





 


Glucose (Fingerstick)


 


 109 mg/dL


(70-99) 206 mg/dL


(70-99) 304 mg/dL


(70-99)


 


Test


 9/12/21


21:15 9/13/21


05:20 9/13/21


06:52 





 


Glucose (Fingerstick)


 163 mg/dL


(70-99) 


 251 mg/dL


(70-99) 





 


White Blood Count


 


 8.9 x10^3/uL


(4.0-11.0) 


 





 


Red Blood Count


 


 3.85 x10^6/uL


(3.50-5.40) 


 





 


Hemoglobin


 


 11.0 g/dL


(12.0-15.5) 


 





 


Hematocrit


 


 33.1 %


(36.0-47.0) 


 





 


Mean Corpuscular Volume  86 fL ()   


 


Mean Corpuscular Hemoglobin  29 pg (25-35)   


 


Mean Corpuscular Hemoglobin


Concent 


 33 g/dL


(31-37) 


 





 


Red Cell Distribution Width


 


 16.1 %


(11.5-14.5) 


 





 


Platelet Count


 


 349 x10^3/uL


(140-400) 


 





 


Neutrophils (%) (Auto)  71 % (31-73)   


 


Lymphocytes (%) (Auto)  15 % (24-48)   


 


Monocytes (%) (Auto)  14 % (0-9)   


 


Eosinophils (%) (Auto)  0 % (0-3)   


 


Basophils (%) (Auto)  1 % (0-3)   


 


Neutrophils # (Auto)


 


 6.2 x10^3/uL


(1.8-7.7) 


 





 


Lymphocytes # (Auto)


 


 1.3 x10^3/uL


(1.0-4.8) 


 





 


Monocytes # (Auto)


 


 1.2 x10^3/uL


(0.0-1.1) 


 





 


Eosinophils # (Auto)


 


 0.0 x10^3/uL


(0.0-0.7) 


 





 


Basophils # (Auto)


 


 0.1 x10^3/uL


(0.0-0.2) 


 





 


Sodium Level


 


 132 mmol/L


(136-145) 


 





 


Potassium Level


 


 4.2 mmol/L


(3.5-5.1) 


 





 


Chloride Level


 


 97 mmol/L


() 


 





 


Carbon Dioxide Level


 


 27 mmol/L


(21-32) 


 





 


Anion Gap  8 (6-14)   


 


Blood Urea Nitrogen


 


 17 mg/dL


(7-20) 


 





 


Creatinine


 


 1.4 mg/dL


(0.6-1.0) 


 





 


Estimated GFR


(Cockcroft-Gault) 


 35.7 


 


 





 


Glucose Level


 


 260 mg/dL


(70-99) 


 





 


Calcium Level


 


 9.1 mg/dL


(8.5-10.1) 


 











Laboratory Tests








Test


 9/12/21


11:46 9/12/21


17:40 9/12/21


21:15 9/13/21


05:20


 


Glucose (Fingerstick)


 206 mg/dL


(70-99) 304 mg/dL


(70-99) 163 mg/dL


(70-99) 





 


White Blood Count


 


 


 


 8.9 x10^3/uL


(4.0-11.0)


 


Red Blood Count


 


 


 


 3.85 x10^6/uL


(3.50-5.40)


 


Hemoglobin


 


 


 


 11.0 g/dL


(12.0-15.5)


 


Hematocrit


 


 


 


 33.1 %


(36.0-47.0)


 


Mean Corpuscular Volume    86 fL () 


 


Mean Corpuscular Hemoglobin    29 pg (25-35) 


 


Mean Corpuscular Hemoglobin


Concent 


 


 


 33 g/dL


(31-37)


 


Red Cell Distribution Width


 


 


 


 16.1 %


(11.5-14.5)


 


Platelet Count


 


 


 


 349 x10^3/uL


(140-400)


 


Neutrophils (%) (Auto)    71 % (31-73) 


 


Lymphocytes (%) (Auto)    15 % (24-48) 


 


Monocytes (%) (Auto)    14 % (0-9) 


 


Eosinophils (%) (Auto)    0 % (0-3) 


 


Basophils (%) (Auto)    1 % (0-3) 


 


Neutrophils # (Auto)


 


 


 


 6.2 x10^3/uL


(1.8-7.7)


 


Lymphocytes # (Auto)


 


 


 


 1.3 x10^3/uL


(1.0-4.8)


 


Monocytes # (Auto)


 


 


 


 1.2 x10^3/uL


(0.0-1.1)


 


Eosinophils # (Auto)


 


 


 


 0.0 x10^3/uL


(0.0-0.7)


 


Basophils # (Auto)


 


 


 


 0.1 x10^3/uL


(0.0-0.2)


 


Sodium Level


 


 


 


 132 mmol/L


(136-145)


 


Potassium Level


 


 


 


 4.2 mmol/L


(3.5-5.1)


 


Chloride Level


 


 


 


 97 mmol/L


()


 


Carbon Dioxide Level


 


 


 


 27 mmol/L


(21-32)


 


Anion Gap    8 (6-14) 


 


Blood Urea Nitrogen


 


 


 


 17 mg/dL


(7-20)


 


Creatinine


 


 


 


 1.4 mg/dL


(0.6-1.0)


 


Estimated GFR


(Cockcroft-Gault) 


 


 


 35.7 





 


Glucose Level


 


 


 


 260 mg/dL


(70-99)


 


Calcium Level


 


 


 


 9.1 mg/dL


(8.5-10.1)


 


Test


 9/13/21


06:52 


 


 





 


Glucose (Fingerstick)


 251 mg/dL


(70-99) 


 


 











Brief Hospital Course


Ms. Hood  is a 86 old female who presented with impacted subcapital fracture 

of the right hip.  Consultation was placed to orthopedic surgery.  She had open 

reduction with percutaneous pin fixation of her right hip.  She worked with 

physical therapy and was recommended acute rehab with rolling walker.





Discharge Information


Condition at Discharge:  Improved


Disposition/Orders:  D/C to Another Facility


Scheduled


Apixaban (Eliquis) 5 Mg Tablet, 5 MG PO BID for AFIB, (Reported)


   Entered as Reported by: MARIBELL ACE on 9/8/21 0508


   Last Action: New Order on 9/8/21 0508 by MARIBELL ACE


Atorvastatin Calcium (Atorvastatin Calcium) 10 Mg Tablet, 10 MG PO HS for FOR 

CHOLESTEROL, #30 Ref 0 (Reported)


   Entered as Reported by: MARIBELL ACE on 9/8/21 0508


   Last Action: Continued on 9/8/21 1124 by JUNITO ROJAS


Diltiazem HCl (Diltiazem 24Hr ER (LA)) 180 Mg Tab.er.24h, 1 TAB PO DAILY for 

afib for 30 Days, #30 Ref 0 (Reported)


   Entered as Reported by: HAYES DURÁN on 9/10/21 1729


   Last Taken: Unknown Dose on Unknown Date & Time     Last Action: Converted on

9/10/21 1736 by HAYES DURÁN


Dorzolamide Hcl/Timolol Maleat (Cosopt Eye Drops) 10 Ml Drops, 1 DROP EACHEYE B

ID for GLAUCOMA, #10 Ref 0 (Reported)


   Entered as Reported by: MARIBELL ACE on 9/8/21 0508


   Last Action: Converted on 9/8/21 1154 by JUNITO ROJAS


Insulin Glargine,Hum.rec.anlog (Lantus Solostar) 100 Unit/1 Ml Insuln.pen, 30 

UNIT SQ QHS for DM, #15 Ref 3 (Reported)


   Entered as Reported by: MARIBELL ACE on 9/8/21 0508


   Last Action: Converted on 9/8/21 1154 by JUNITO ROJAS


Insulin Lispro (Admelog) 100 Unit/1 Ml Vial, 0-12 UNIT SQ TIDWMEALS for DM, 

(Reported)


   Entered as Reported by: MARIBELL ACE on 9/8/21 0508


   Last Action: Continued on 9/8/21 1124 by JUNITO ROJAS


Insulin Lispro (Admelog) 100 Unit/1 Ml Vial, 8 UNIT SQ TIDAC for DM, (Reported)


   Entered as Reported by: MARIBELL ACE on 9/8/21 0508


   Last Action: New Order on 9/8/21 0508 by MARIBELL ACE


Ipratropium/Albuterol Sulfate (Duoneb 0.5-3(2.5) Mg/3 Ml) 3 Ml Ampul.neb, 3 ML 

NEB QID for ASTHMA, (Reported)


   Entered as Reported by: MARIBELL ACE on 9/8/21 0508


   Last Action: Continued on 9/8/21 1154 by JUNITO ROJAS


Latanoprost (Xalatan) 2.5 Ml Drops, 1 DROP OU BID for GLAUCOMA, (Reported)


   Entered as Reported by: MARIBELL ACE on 9/8/21 0508


   Last Action: Continued on 9/8/21 1154 by JUNITO ROJAS


Losartan Potassium (Cozaar **) 25 Mg Tablet, 25 MG PO DAILY for HYPERTENSION, 

(Reported)


   Entered as Reported by: MARIBELL ACE on 9/8/21 0508


   Last Action: Continued on 9/8/21 1154 by JUNITO ROJAS


Montelukast Sodium (Singulair Tablet **) 10 Mg Tablet, 10 MG PO HS for FOR 

ASTHMA, Ref 0 (Reported)


   Entered as Reported by: MARIBELL ACE on 9/8/21 0508


   Last Action: Continued on 9/8/21 1154 by JUNITO ROJAS


Triamterene/Hydrochlorothiazid (Triamterene-Hctz 37.5-25 Mg Cp) 1 Each Capsule, 

1 CAP PO DAILY for HTN, #30 Ref 5 (Reported)


   Entered as Reported by: MARIBELL ACE on 9/8/21 0508


   Last Action: Converted on 9/8/21 1154 by JUNITO ROJAS





Scheduled PRN


Acetaminophen (Acetaminophen) 325 Mg Tablet, 2 TAB PO PRN Q6HRS PRN for MILD 

PAIN / TEMP > 100.3'F for 30 Days, #30 Ref 0 (Reported)


   Entered as Reported by: MARIBELL ACE on 9/8/21 0507


   Last Action: New Order on 9/8/21 0507 by MARIBELL ACE


Albuterol Sulfate (Ventolin Hfa Inhaler) 18 Gm Hfa.aer.ad, 2 PUFF INH PRN Q4HRS 

PRN for WHEEZING, Ref 0 (Reported)


   Entered as Reported by: MARIBELL ACE on 9/8/21 0508


   Last Action: Converted on 9/8/21 1154 by JUNITO ROJAS





Justicifation of Admission Dx:


Justifications for Admission:


Justification of Admission Dx:  Yes











TERESA FREEMAN MD            Sep 13, 2021 09:53

## 2021-09-13 NOTE — PDOC
[employer] PROGRESS NOTE


Date of Service


DOS:


DATE: 9/13/21 


TIME: 09:48





Chief Complaint


Chief Complaint


Fall with right hip fracture.


Hyperlipidemia


Hypertension


Arthritis


Polypharmacy


Chronic Anticoagulation


Asthma


Glaucoma


Diabetes





History of Present Illness


History of Present Illness


9/9: Mrs. Hood was seen and evaluated this morning while in her room at 

bedside. We reviewed her chart and discussed her current disposition with her 

nurse. She is currently receiving 3L of Oxygen. Dr. Ordonez plans to operate 

this afternoon.


9/10: Mrs. Hood was evaluated and seen this morning while in her room, 

sitting up. We reviewed her chart and we discussed her current disposition with 

her nurse. She is post-op day 1 and her wound is dry, intact and clear. 


9/11: Mrs. Hood was seen and evaluated this morning in her room. She was 

sitting up and eating her breakfast. Overall, she appears to be doing well, and 

her wound was dry, intact and clear. She is post-op day 2. We discussed her 

current disposition with her nurse and reviewed her chart. She is currently on 

5L of Oxygen. 


9/12: Mrs. Hood was evaluated and examined this morning in her room. She was 

sitting up and was able to eat her breakfast this morning. Her wound was intact 

and dry. Last night she did have some pain and Lortab was given to manage this. 

We will consult wound care for her coccygeal pain. We discussed her current 

disposition with her nurse and reviewed her chart. I also spoke with her 

about our plan for his wife's care. 


9/13: Patient seen and evaluated bedside.  Pain is manageable with Lortab.  She 

will discharged today to SNU.  Greater than 30 minutes spent managing the d

ischarge of this patient.





Vitals/I&O


Vitals/I&O:





                                   Vital Signs








  Date Time  Temp Pulse Resp B/P (MAP) Pulse Ox O2 Delivery O2 Flow Rate FiO2


 


9/13/21 08:46  94  134/66    


 


9/13/21 07:38     97 Nasal Cannula 4.0 


 


9/13/21 07:00 97.9  18     





 97.9       














                                    I & O   


 


 9/12/21 9/12/21 9/13/21





 15:00 23:00 07:00


 


Intake Total  100 ml 


 


Balance  100 ml 











Physical Exam


General:  Alert, Oriented X3, Cooperative


Heart:  Regular rate, No murmurs


Lungs:  Clear


Abdomen:  Normal bowel sounds


Extremities:  No clubbing


Skin:  No rashes





Labs


Labs:





Laboratory Tests








Test


 9/12/21


11:46 9/12/21


17:40 9/12/21


21:15 9/13/21


05:20


 


Glucose (Fingerstick)


 206 mg/dL


(70-99) 304 mg/dL


(70-99) 163 mg/dL


(70-99) 





 


White Blood Count


 


 


 


 8.9 x10^3/uL


(4.0-11.0)


 


Red Blood Count


 


 


 


 3.85 x10^6/uL


(3.50-5.40)


 


Hemoglobin


 


 


 


 11.0 g/dL


(12.0-15.5)


 


Hematocrit


 


 


 


 33.1 %


(36.0-47.0)


 


Mean Corpuscular Volume    86 fL () 


 


Mean Corpuscular Hemoglobin    29 pg (25-35) 


 


Mean Corpuscular Hemoglobin


Concent 


 


 


 33 g/dL


(31-37)


 


Red Cell Distribution Width


 


 


 


 16.1 %


(11.5-14.5)


 


Platelet Count


 


 


 


 349 x10^3/uL


(140-400)


 


Neutrophils (%) (Auto)    71 % (31-73) 


 


Lymphocytes (%) (Auto)    15 % (24-48) 


 


Monocytes (%) (Auto)    14 % (0-9) 


 


Eosinophils (%) (Auto)    0 % (0-3) 


 


Basophils (%) (Auto)    1 % (0-3) 


 


Neutrophils # (Auto)


 


 


 


 6.2 x10^3/uL


(1.8-7.7)


 


Lymphocytes # (Auto)


 


 


 


 1.3 x10^3/uL


(1.0-4.8)


 


Monocytes # (Auto)


 


 


 


 1.2 x10^3/uL


(0.0-1.1)


 


Eosinophils # (Auto)


 


 


 


 0.0 x10^3/uL


(0.0-0.7)


 


Basophils # (Auto)


 


 


 


 0.1 x10^3/uL


(0.0-0.2)


 


Sodium Level


 


 


 


 132 mmol/L


(136-145)


 


Potassium Level


 


 


 


 4.2 mmol/L


(3.5-5.1)


 


Chloride Level


 


 


 


 97 mmol/L


()


 


Carbon Dioxide Level


 


 


 


 27 mmol/L


(21-32)


 


Anion Gap    8 (6-14) 


 


Blood Urea Nitrogen


 


 


 


 17 mg/dL


(7-20)


 


Creatinine


 


 


 


 1.4 mg/dL


(0.6-1.0)


 


Estimated GFR


(Cockcroft-Gault) 


 


 


 35.7 





 


Glucose Level


 


 


 


 260 mg/dL


(70-99)


 


Calcium Level


 


 


 


 9.1 mg/dL


(8.5-10.1)


 


Test


 9/13/21


06:52 


 


 





 


Glucose (Fingerstick)


 251 mg/dL


(70-99) 


 


 














Comment


Review of Relevant


I have reviewed the following items ellie (where applicable) has been applied.


Medications:





Current Medications








 Medications


  (Trade)  Dose


 Ordered  Sig/Surendra


 Route


 PRN Reason  Start Time


 Stop Time Status Last Admin


Dose Admin


 


 Lorazepam


  (Ativan)  1 mg  PRN Q8HRS  PRN


 PO


 ANXIETY / AGITATION  9/12/21 17:45


    9/13/21 02:30














Justifications for Admission


Other Justification














TERESA FREEMAN MD            Sep 13, 2021 09:49

## 2021-09-13 NOTE — SNU/HH DC
DISCHARGE ORDERS


DISCHARGE INFORMATION:


DISCHARGE DATE:  Sep 13, 2021


CONDITION ON DISCHARGE:  Stable





CODE STATUS:


Code Status:  DNR/DNI





SKILLED NURSING:


SNF STAY <30 DAYS:  Yes





POST DISCHARGE ORDERS:


DIET AFTER DISCHARGE:  Cardiac





CHECKS AFTER DISCHARGE:


CHECKS AFTER DISCHARGE:  Check blood sugar, ac/hs





TREATMENT/EQUIPMENT ORDERS:


ADAPTIVE EQUIPMENT NEEDED:  Front wheeled walker


Physical Therapy For:  Evalulation/Treatment


Occupational Therapy For:  Evaluation/Treatment





DISCHARGE MEDICATIONS:


Home Meds


Reported Medications


Diltiazem HCl (Diltiazem 24Hr ER (LA)) 180 Mg Tab.er.24h, 1 TAB PO DAILY for 

afib for 30 Days, #30 TAB 0 Refills


   9/10/21


Latanoprost (XALATAN) 2.5 Ml Drops, 1 DROP OU BID for GLAUCOMA, ML


   9/8/21


Triamterene/Hydrochlorothiazid (TRIAMTERENE-HCTZ 37.5-25 MG CP) 1 Each Capsule, 

1 CAP PO DAILY for HTN, #30 CAP 5 Refills


   9/8/21


Albuterol Sulfate (VENTOLIN HFA INHALER) 18 Gm Hfa.aer.ad, 2 PUFF INH PRN Q4HRS 

PRN for WHEEZING, EACH 0 Refills


   9/8/21


Montelukast Sodium (SINGULAIR TABLET **) 10 Mg Tablet, 10 MG PO HS for FOR 

ASTHMA, TAB 0 Refills


   9/8/21


Ipratropium/Albuterol Sulfate (DUONEB 0.5-3(2.5) MG/3 ML) 3 Ml Ampul.neb, 3 ML 

NEB QID for ASTHMA, EACH


   9/8/21


Insulin Glargine,Hum.rec.anlog (LANTUS SOLOSTAR) 100 Unit/1 Ml Insuln.pen, 30 

UNIT SQ QHS for DM, #15 ML 3 Refills


   9/8/21


Apixaban (ELIQUIS) 5 Mg Tablet, 5 MG PO BID for AFIB, TAB


   9/8/21


Losartan Potassium (COZAAR **) 25 Mg Tablet, 25 MG PO DAILY for HYPERTENSION, 

TAB


   9/8/21


Dorzolamide Hcl/Timolol Maleat (COSOPT EYE DROPS) 10 Ml Drops, 1 DROP EACHEYE BI

D for GLAUCOMA, #10 ML 0 Refills


   9/8/21


Atorvastatin Calcium (ATORVASTATIN CALCIUM) 10 Mg Tablet, 10 MG PO HS for FOR 

CHOLESTEROL, #30 TAB 0 Refills


   9/8/21


Insulin Lispro (Admelog) 100 Unit/1 Ml Vial, 8 UNIT SQ TIDAC for DM, EACH


   9/8/21


Insulin Lispro (Admelog) 100 Unit/1 Ml Vial, 0-12 UNIT SQ TIDWMEALS for DM, EACH


   9/8/21


Acetaminophen (ACETAMINOPHEN) 325 Mg Tablet, 2 TAB PO PRN Q6HRS PRN for MILD 

PAIN / TEMP > 100.3'F for 30 Days, #30 TAB 0 Refills


   9/8/21











TERESA FREEMAN MD            Sep 13, 2021 09:58

## 2021-09-13 NOTE — NUR
Attempt made to call report to [facility]. They stated they would have the nurse call be to 
return call to obtain report.

## 2021-09-13 NOTE — NUR
MARY following. Discussed with RN, updates and discharge orders faxed to [facility]. Awaiting 
transportation time. MARY will continue to follow.

-------------------------------------------------------------------------------

Addendum: 09/13/21 at 1431 by CAROLA HERNANDEZ

-------------------------------------------------------------------------------

[facility] arranged transport for between [id number]. RN notified.

## 2021-09-19 ENCOUNTER — HOSPITAL ENCOUNTER (INPATIENT)
Dept: HOSPITAL 63 - ER | Age: 86
LOS: 10 days | Discharge: NURSING, REHAB OR LONG-TERM CARE | DRG: 177 | End: 2021-09-29
Attending: HOSPITALIST | Admitting: HOSPITALIST
Payer: MEDICARE

## 2021-09-19 VITALS — WEIGHT: 150.58 LBS | BODY MASS INDEX: 26.35 KG/M2 | HEIGHT: 63.5 IN

## 2021-09-19 DIAGNOSIS — Z88.0: ICD-10-CM

## 2021-09-19 DIAGNOSIS — I50.33: ICD-10-CM

## 2021-09-19 DIAGNOSIS — I13.0: ICD-10-CM

## 2021-09-19 DIAGNOSIS — M19.90: ICD-10-CM

## 2021-09-19 DIAGNOSIS — Z90.710: ICD-10-CM

## 2021-09-19 DIAGNOSIS — J44.0: ICD-10-CM

## 2021-09-19 DIAGNOSIS — L89.619: ICD-10-CM

## 2021-09-19 DIAGNOSIS — E11.649: ICD-10-CM

## 2021-09-19 DIAGNOSIS — L89.159: ICD-10-CM

## 2021-09-19 DIAGNOSIS — I25.10: ICD-10-CM

## 2021-09-19 DIAGNOSIS — Z88.8: ICD-10-CM

## 2021-09-19 DIAGNOSIS — I70.0: ICD-10-CM

## 2021-09-19 DIAGNOSIS — L89.91: ICD-10-CM

## 2021-09-19 DIAGNOSIS — E78.5: ICD-10-CM
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
## 2021-09-19 DIAGNOSIS — E11.22: ICD-10-CM

## 2021-09-19 DIAGNOSIS — N18.30: ICD-10-CM

## 2021-09-19 DIAGNOSIS — F03.90: ICD-10-CM
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
## 2021-09-19 DIAGNOSIS — J96.01: ICD-10-CM

## 2021-09-19 DIAGNOSIS — Z86.718: ICD-10-CM

## 2021-09-19 DIAGNOSIS — Z79.01: ICD-10-CM
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
## 2021-09-19 DIAGNOSIS — J69.0: Primary | ICD-10-CM

## 2021-09-19 DIAGNOSIS — I48.0: ICD-10-CM
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
## 2021-09-19 DIAGNOSIS — J98.11: ICD-10-CM

## 2021-09-19 DIAGNOSIS — I95.9: ICD-10-CM

## 2021-09-19 DIAGNOSIS — L89.629: ICD-10-CM

## 2021-09-19 LAB
ALBUMIN SERPL-MCNC: 2.1 G/DL (ref 3.4–5)
ALBUMIN/GLOB SERPL: 0.6 {RATIO} (ref 1–1.7)
ALP SERPL-CCNC: 82 U/L (ref 46–116)
ALT SERPL-CCNC: 15 U/L (ref 14–59)
ANION GAP SERPL CALC-SCNC: 7 MMOL/L (ref 6–14)
AST SERPL-CCNC: 19 U/L (ref 15–37)
BASOPHILS # BLD AUTO: 0.1 X10^3/UL (ref 0–0.2)
BASOPHILS NFR BLD: 1 % (ref 0–3)
BILIRUB SERPL-MCNC: 0.2 MG/DL (ref 0.2–1)
BUN/CREAT SERPL: 19 (ref 6–20)
CA-I SERPL ISE-MCNC: 26 MG/DL (ref 7–20)
CALCIUM SERPL-MCNC: 8.3 MG/DL (ref 8.5–10.1)
CHLORIDE SERPL-SCNC: 100 MMOL/L (ref 98–107)
CO2 SERPL-SCNC: 28 MMOL/L (ref 21–32)
CREAT SERPL-MCNC: 1.4 MG/DL (ref 0.6–1)
EOSINOPHIL NFR BLD: 0.3 X10^3/UL (ref 0–0.7)
EOSINOPHIL NFR BLD: 3 % (ref 0–3)
ERYTHROCYTE [DISTWIDTH] IN BLOOD BY AUTOMATED COUNT: 17.2 % (ref 11.5–14.5)
GFR SERPLBLD BASED ON 1.73 SQ M-ARVRAT: 35.7 ML/MIN
GLOBULIN SER-MCNC: 3.6 G/DL (ref 2.2–3.8)
GLUCOSE SERPL-MCNC: 154 MG/DL (ref 70–99)
HCT VFR BLD CALC: 30.6 % (ref 36–47)
HGB BLD-MCNC: 10.1 G/DL (ref 12–15.5)
LYMPHOCYTES # BLD: 2.4 X10^3/UL (ref 1–4.8)
LYMPHOCYTES NFR BLD AUTO: 24 % (ref 24–48)
MCH RBC QN AUTO: 29 PG (ref 25–35)
MCHC RBC AUTO-ENTMCNC: 33 G/DL (ref 31–37)
MCV RBC AUTO: 87 FL (ref 79–100)
MONO #: 1.3 X10^3/UL (ref 0–1.1)
MONOCYTES NFR BLD: 14 % (ref 0–9)
NEUT #: 5.7 X10^3UL (ref 1.8–7.7)
NEUTROPHILS NFR BLD AUTO: 58 % (ref 31–73)
PLATELET # BLD AUTO: 490 X10^3/UL (ref 140–400)
POTASSIUM SERPL-SCNC: 3.6 MMOL/L (ref 3.5–5.1)
PROT SERPL-MCNC: 5.7 G/DL (ref 6.4–8.2)
RBC # BLD AUTO: 3.52 X10^6/UL (ref 3.5–5.4)
SODIUM SERPL-SCNC: 135 MMOL/L (ref 136–145)
WBC # BLD AUTO: 9.8 X10^3/UL (ref 4–11)

## 2021-09-19 PROCEDURE — 36415 COLL VENOUS BLD VENIPUNCTURE: CPT

## 2021-09-19 PROCEDURE — 80048 BASIC METABOLIC PNL TOTAL CA: CPT

## 2021-09-19 PROCEDURE — 93005 ELECTROCARDIOGRAM TRACING: CPT

## 2021-09-19 PROCEDURE — 71250 CT THORAX DX C-: CPT

## 2021-09-19 PROCEDURE — 85027 COMPLETE CBC AUTOMATED: CPT

## 2021-09-19 PROCEDURE — 74230 X-RAY XM SWLNG FUNCJ C+: CPT

## 2021-09-19 PROCEDURE — 72193 CT PELVIS W/DYE: CPT

## 2021-09-19 PROCEDURE — 94760 N-INVAS EAR/PLS OXIMETRY 1: CPT

## 2021-09-19 PROCEDURE — 80053 COMPREHEN METABOLIC PANEL: CPT

## 2021-09-19 PROCEDURE — 87426 SARSCOV CORONAVIRUS AG IA: CPT

## 2021-09-19 PROCEDURE — 96374 THER/PROPH/DIAG INJ IV PUSH: CPT

## 2021-09-19 PROCEDURE — 83735 ASSAY OF MAGNESIUM: CPT

## 2021-09-19 PROCEDURE — 81001 URINALYSIS AUTO W/SCOPE: CPT

## 2021-09-19 PROCEDURE — 83880 ASSAY OF NATRIURETIC PEPTIDE: CPT

## 2021-09-19 PROCEDURE — 71045 X-RAY EXAM CHEST 1 VIEW: CPT

## 2021-09-19 PROCEDURE — 85025 COMPLETE CBC W/AUTO DIFF WBC: CPT

## 2021-09-19 PROCEDURE — 87040 BLOOD CULTURE FOR BACTERIA: CPT

## 2021-09-19 PROCEDURE — 84145 PROCALCITONIN (PCT): CPT

## 2021-09-19 PROCEDURE — 82947 ASSAY GLUCOSE BLOOD QUANT: CPT

## 2021-09-19 PROCEDURE — 71275 CT ANGIOGRAPHY CHEST: CPT

## 2021-09-19 PROCEDURE — 94640 AIRWAY INHALATION TREATMENT: CPT

## 2021-09-19 PROCEDURE — U0003 INFECTIOUS AGENT DETECTION BY NUCLEIC ACID (DNA OR RNA); SEVERE ACUTE RESPIRATORY SYNDROME CORONAVIRUS 2 (SARS-COV-2) (CORONAVIRUS DISEASE [COVID-19]), AMPLIFIED PROBE TECHNIQUE, MAKING USE OF HIGH THROUGHPUT TECHNOLOGIES AS DESCRIBED BY CMS-2020-01-R: HCPCS

## 2021-09-19 NOTE — PHYS DOC
Past History


Past Medical History:  Arthritis


 (TEA DENTON)


Past Surgical History:  No Surgical History


 (TEA DENTON)


Alcohol Use:  None


 (TEA DENTON)





General Adult


EDM:


Chief Complaint:  HIP PAIN





HPI:


HPI:


Patient is an 86-year-old female presents to the ER via EMS from assisted 

nursing facility for pain to her coccyx.  Patient has stage I pressure ulcer.  

Dressings are being placed at the assisted nursing home.  Patient denies any 

falls or injuries, fevers.  Patient is alert and oriented to person place and 

situation which is her baseline.  Patient has a history of dementia.


 (TEA DENTON)





Review of Systems:


Review of Systems:


14 body systems of the review of systems have been reviewed.  See HPI for 

pertinent positive and negative responses, otherwise all other systems are 

negative, nonpertinent or noncontributory


 (TEA DENTON)





Allergies:


Allergies:





Allergies








Coded Allergies Type Severity Reaction Last Updated Verified


 


  ibuprofen Allergy Severe Anaphylaxis 8/11/21 Yes


 


  Penicillins Allergy Intermediate  8/12/21 Yes








 (TEA DENTON)





Physical Exam:


PE:





Constitutional: Well developed, well nourished, no acute distress, non-toxic 

appearance. []


HENT: Normocephalic, atraumatic, bilateral external ears normal, oropharynx 

moist, no oral exudates, nose normal. []


Eyes: PERRL, EOMI, conjunctiva normal, no discharge. [] 


Neck: Normal range of motion, no tenderness, supple, no stridor. [] 


Cardiovascular:Heart rate regular rhythm, no murmur []


Lungs & Thorax:  Bilateral breath sounds clear to auscultation []


Abdomen: Bowel sounds normal, soft, no tenderness, no masses, no pulsatile 

masses. [] 


Skin: Warm, dry, no rash, stage I pressure ulcer to patient's coccyx


Back: No tenderness, normal range of motion


Extremities: No tenderness, no cyanosis, no clubbing, ROM intact, no edema, no 

shortening or rotation, no bony point tenderness of hips or lower extremities. 

[] 


Neurologic: Alert and oriented X 3, normal motor function, normal sensory 

function, no focal deficits noted. []


Psychologic: Affect normal, judgement normal, mood normal. []


 (TEA DENTON)





EKG:


EKG:


[]


 (TEA DENTON)





Radiology/Procedures:


Radiology/Procedures:


[]


 (TEA DENTON)





Heart Score:


C/O Chest Pain:  No


Risk Factors:


Risk Factors:  DM, Current or recent (<one month) smoker, HTN, HLP, family 

history of CAD, obesity.


Risk Scores:


Score 0 - 3:  2.5% MACE over next 6 weeks - Discharge Home


Score 4 - 6:  20.3% MACE over next 6 weeks - Admit for Clinical Observation


Score 7 - 10:  72.7% MACE over next 6 weeks - Early Invasive Strategies


 (TEA DENTON)





Course & Med Decision Making:


Course & Med Decision Making


Pertinent Labs and Imaging studies reviewed. (See chart for details)





[] Patient is an 86-year-old female being seen in the ER for pain to her coccyx 

from a pressure ulcer.  A CT of her pelvis and hip was performed.  CBC and CMP 

performed in the ER.  Patient's vital signs are stable and she is in no acute 

distress.  Wound care provided for patient and dressing change.


2203: I discussed patients case with Dr. Cook and he will assume patient care 

at this time. Lab work/imaging pending.


 (TEA DENTON)


Course & Med Decision Making


Patient care handed off to me at checkout.  Patient 86 years old with dementia, 

and a recently repaired right femoral neck fracture but was internally fixed and

 stable.  Chest CT with no pulmonary embolism but does have consolidative pneumo

yamilet.  Patient kept on the monitor with IV fluid resuscitation and started on 

antibiotics.  Pain controlled.  Given patient's age, dementia and coming from a 

nursing home with recent fall we felt it best to admit her here to [facility] 

for continued evaluation and treatment until more stable for discharge back to 

nursing home.


 (WAQAR COOK MD)


Dragon Disclaimer:


Dragon Disclaimer:


This electronic medical record was generated, in whole or in part, using a voice

 recognition dictation system.


 (TEA DENTON)





Departure


Departure:


Impression:  


   Primary Impression:  


   Fall from bed


   Additional Impressions:  


   Pneumonia


   Dementia


Disposition:  09 ADMITTED AS INPATIENT


Admitting Physician:  Mani Dang


 (WAQAR COOK MD)


Condition:  STABLE


Referrals:  


MIGUEL LIPSCOMB MD (PCP)











TEA DENTON          Sep 19, 2021 21:29


WAQAR COOK MD               Sep 20, 2021 00:43

## 2021-09-19 NOTE — RAD
Study: CT CHEST WITH CONTRAST - PULMONARY ANGIOGRAM



History: Hypoxia. Recent fracture. Pulmonary embolism.



Comparison: CT chest without contrast 7/15/2021



Technique:  Helical CT of the chest performed after the administration of 75 cc Omnipaque 350 intrave
nous contrast and timed for angiographic evaluation of the pulmonary arteries per PE protocol. Corona
l and sagittal 3D MIP reformations were obtained.



One or more of the following individualized dose reduction techniques were utilized for this examinat
ion:  



1. Automated exposure control

2. Adjustment of the mA and/or kV according to patient size

3. Use of iterative reconstruction technique.



Findings: 



Pulmonary Arteries: No definitive main, lobar or segmental pulmonary embolism noting that the pulmona
ry arteries to the inferomedial right middle lobe are difficult to evaluate. Main pulmonary artery ca
liber is within normal limits at under 3 cm transverse.



Heart/Systemic Vasculature: Extensive calcific atherosclerosis with severe trivessel coronary artery 
involvement. No aortic aneurysm or definitive dissection considering bolus timing.



Mediastinum: A few mildly enlarged mediastinal lymph nodes such as pretracheal on image 40 series 4 m
easuring 1 cm short axis.



Lungs: Consolidative opacities at the right lung apex. More mild nodular infiltrates at a few additio
nal locations. Small pleural effusion on the right and a trace amount of pleural fluid on the left. S
cattered atelectasis. Interlobular septal prominence at the apices. Bronchial wall thickening and int
ermittent small airway opacification at the left lower lobe.



Neck/Axilla/Body Wall: Unchanged right thyroid lobe nodules. No axillary adenopathy. Mild body wall e
rhiannon best seen below the diaphragm.



Upper Abdomen: Numerous calcified gallstones. Colonic diverticuli. At least mild constipation.



Bones: Osteopenia. Advanced arthrosis at the right shoulder. Less pronounced arthrosis on the left. M
ultilevel spondylosis. No change in vertebral body height.



Miscellaneous: None.



IMPRESSION: 



1.  No main, lobar or segmental pulmonary embolism.

2.  Consolidative opacities at the right upper lobe and scattered nodular infiltrates elsewhere. Smal
l right and trace left pleural effusions. The findings suggest pneumonia on a background of congestiv
e heart failure/volume overload. A few mildly prominent mediastinal lymph nodes are favored reactive.
 Consider follow-up to confirm resolution of the nodules such as in 3-6 months.

3.  Severe calcific atherosclerosis to include trivessel coronary artery involvement.

4.  Additional chronic observations to include numerous calcified gallstones as outlined in the body 
of the report.



Electronically signed by: JOSE ESCALANTE MD (9/19/2021 11:57 PM) [facility]

## 2021-09-20 VITALS — DIASTOLIC BLOOD PRESSURE: 81 MMHG | SYSTOLIC BLOOD PRESSURE: 148 MMHG

## 2021-09-20 VITALS — DIASTOLIC BLOOD PRESSURE: 75 MMHG | SYSTOLIC BLOOD PRESSURE: 127 MMHG

## 2021-09-20 VITALS — DIASTOLIC BLOOD PRESSURE: 71 MMHG | SYSTOLIC BLOOD PRESSURE: 125 MMHG

## 2021-09-20 LAB
APTT PPP: YELLOW S
BACTERIA #/AREA URNS HPF: 0 /HPF
BILIRUB UR QL STRIP: (no result)
FIBRINOGEN PPP-MCNC: CLEAR MG/DL
GLUCOSE UR STRIP-MCNC: (no result) MG/DL
NITRITE UR QL STRIP: (no result)
RBC #/AREA URNS HPF: (no result) /HPF (ref 0–2)
SP GR UR STRIP: 1.01
SQUAMOUS #/AREA URNS LPF: (no result) /LPF
UROBILINOGEN UR-MCNC: 0.2 MG/DL
WBC #/AREA URNS HPF: (no result) /HPF (ref 0–4)

## 2021-09-20 RX ADMIN — MORPHINE SULFATE PRN MG: 2 INJECTION, SOLUTION INTRAMUSCULAR; INTRAVENOUS at 02:26

## 2021-09-20 RX ADMIN — MORPHINE SULFATE PRN MG: 2 INJECTION, SOLUTION INTRAMUSCULAR; INTRAVENOUS at 19:09

## 2021-09-20 RX ADMIN — MORPHINE SULFATE PRN MG: 2 INJECTION, SOLUTION INTRAMUSCULAR; INTRAVENOUS at 21:14

## 2021-09-20 RX ADMIN — MORPHINE SULFATE PRN MG: 2 INJECTION, SOLUTION INTRAMUSCULAR; INTRAVENOUS at 04:50

## 2021-09-20 RX ADMIN — MORPHINE SULFATE PRN MG: 2 INJECTION, SOLUTION INTRAMUSCULAR; INTRAVENOUS at 11:02

## 2021-09-20 RX ADMIN — ALBUTEROL SULFATE PRN MG: 108 AEROSOL, METERED RESPIRATORY (INHALATION) at 22:46

## 2021-09-20 NOTE — NUR
ADMISSION



Pt admitted to one [facility] bed 124 by Dr. Dang for Dementia, PNA and falls. VSS and resting in 
bed comfortably at this time. Will pass rest of admission to oncoming shift.



CC, RN

## 2021-09-20 NOTE — HP
ADMIT DATE: 09/19/2021

ATTENDING PHYSICIAN:  Dr. Dang.



CHIEF COMPLAINT:  Pain in the coccyx and shortness of breath.



HISTORY OF PRESENT ILLNESS:  The patient is an 86-year-old female from [facility].  She has profound dementia.  She was admitted from the ED with 

increasing pain to her coccyx, no recent falls.  She had extensive workup.  CT 

scan showed bilateral infiltrates consistent with nursing home acquired 

pneumonia.  She was to be admitted to the hospitalist service.  She has been in 

the ER all night.  The family initially requested that she return back to [place] for IV antibiotics there, Dr. Vega was notified. He declined and felt that 

it would be better off with the patient admitted to the hospital. She was 

admitted then for community-acquired pneumonia, one dose of Rocephin was given. 

We will continue other home meds.



PAST MEDICAL HISTORY:  Significant for:

1.  Profound dementia.

2.  Recent fall with hip fracture, being hospitalized at [place] for ORIF, 

generalized weakness that is multifactorial.

3.  Acute on chronic congestive heart failure, diastolic.

4. Tachyarrhythmias.

5.  Essential hypertension.

6.  Type 2 diabetes.

7.  Chronic kidney disease.

8.  History of right lower leg DVT, on chronic anticoagulation.



CURRENT MEDICATIONS: Reviewed.  She was scheduled to take albuterol, Eliquis 5 

mg b.i.d., diltiazem 180 daily, dorzolamide eyedrops, Xalatan eyedrops, 

losartan, pravastatin and triamterene/hydrochlorothiazide.



ALLERGIES:  SHE HAS ALLERGIES TO PENICILLIN AND IBUPROFEN, EXACT REACTION IS 

UNCLEAR.



SOCIAL HISTORY:  She is a nonsmoker, nondrinker.



FAMILY HISTORY:  Unobtainable.



REVIEW OF SYSTEMS:  Unobtainable due to the patient's confusion.



PHYSICAL EXAMINATION:

GENERAL:  When I saw her, this is a pleasant elderly female who was not alert to

what is going on.

INITIAL VITAL SIGNS:  Showed that she was afebrile, pulse is 80 and regular, BP 

126/52, oxygen saturation 97% on 1 liter by nasal cannula.

HEENT:  Head is without trauma.  Pupils are reactive.  Sclerae nonicteric.  

Oropharynx is clear.

NECK:  Supple, no bruits.

LUNGS:  Shallow respirations.

CARDIOVASCULAR SYSTEM:  Showed regular heart tones.  No gallops.

ABDOMEN:  Soft.  No guarding.

EXTREMITIES:  Show trace edema.

NEUROLOGIC FUNCTION: Focally intact.  Speech is fluent.  The patient is 

minimally speaking, but responds to some commands.  She is nonambulatory at this

time.



PERTINENT LABORATORY AND X-RAY STUDIES:  The CT of the chest as noted.  

Hemoglobin is 10.1 g/dL with a white count of 9800.  Her electrolytes are within

range.  Creatinine is 1.4 mg percent.  Nonfasting blood sugar 154.



ASSESSMENT:

1.  This 86-year-old female has respiratory distress consistent with pneumonia, 

nursing home acquired.

2.  Acute on chronic diastolic heart failure.

3.  Localized pain to the coccyx without any recent falls.

4.  Recent fall with hip fracture, requiring open reduction and internal 

fixation.

5.  Essential hypertension.

6.  Type 2 diabetes.

7.  Chronic kidney disease.

8.  Profound dementia.

9.  History of right lower extremity deep venous thrombosis, on chronic 

anticoagulation.



PLAN:

1.  Admit to the inpatient unit.

2.  Intravenous antibiotics.

3.  Home medications continued.

4.  She remains a FULL CODE per advanced directives.  Her prognosis is poor.







SYH/VIS

DR: Duane   DD: 09/20/2021 16:22

DT: 09/20/2021 16:37   TID: [id number]

CC:     MIGUEL LIPSCOMB MD

## 2021-09-20 NOTE — RAD
EXAM: CT pelvis with contrast.



HISTORY: Low back and hip wound.



TECHNIQUE: CT of the pelvis was performed after the intravenous administration of iodinated contrast.
 One or more of the following individualized dose reduction techniques were utilized for this examina
tion:  

1. Automated exposure control.  

2. Adjustment of the mA and/or kV according to patient size.  

3. Use of iterative reconstruction technique.



COMPARISON: 09/08/2021.



FINDINGS: Cholelithiasis is noted. A benign-appearing cyst at the right renal lower pole measures 4.0
 cm. Sigmoid and left colonic diverticulosis is severe. There is no small bowel obstruction. The uter
us is surgically absent.



Subcutaneous edema dependently may reflect cellulitis or simply dependent edema. There are changes of
 recent internal fixation of a right femoral neck fracture. This remains in near-anatomic alignment. 
Skin staples remain in place along the right hip. There is no underlying hematoma or collection.



No new fractures identified. There are moderate to severe degenerative changes within the lower lumba
r spine. There is mild joint space narrowing at both hips.



IMPRESSION:

1. Internal fixation of a right femoral neck fracture in expected alignment.

2. No deep ulcer or drainable collection. Correlate for the site of concern.

3. Cholelithiasis.



Electronically signed by: DESMOND Pichardo MD (9/20/2021 12:07 AM) [facility]

## 2021-09-20 NOTE — EKG
[facility] [address], [place] [address]

Test Date:    2021               Test Time:    15:52:53

Pat Name:     CRUZ VIVAS          Department:   

Patient ID:   [id number]           Room:          

Gender:       F                        Technician:   KIRSTIN

:          1935               Requested By: NAJMA FAIRBANKS

Order Number: [id number]            Reading MD:     

                                 Measurements

Intervals                              Axis          

Rate:         111                      P:            

NV:                                    QRS:          -1

QRSD:         92                       T:            -26

QT:           366                                    

QTc:          501                                    

                           Interpretive Statements

IRREGULAR RHYTHM, NO P-WAVE FOUND

LEFTWARD AXIS

LOW LIMB LEAD VOLTAGE

NO SPECIFIC ECG ABNORMALITIES

RI6.02

No previous ECG available for comparison

## 2021-09-21 VITALS — SYSTOLIC BLOOD PRESSURE: 117 MMHG | DIASTOLIC BLOOD PRESSURE: 72 MMHG

## 2021-09-21 VITALS — DIASTOLIC BLOOD PRESSURE: 80 MMHG | SYSTOLIC BLOOD PRESSURE: 134 MMHG

## 2021-09-21 VITALS — DIASTOLIC BLOOD PRESSURE: 71 MMHG | SYSTOLIC BLOOD PRESSURE: 121 MMHG

## 2021-09-21 VITALS — DIASTOLIC BLOOD PRESSURE: 70 MMHG | SYSTOLIC BLOOD PRESSURE: 116 MMHG

## 2021-09-21 VITALS — SYSTOLIC BLOOD PRESSURE: 126 MMHG | DIASTOLIC BLOOD PRESSURE: 62 MMHG

## 2021-09-21 RX ADMIN — ALBUTEROL SULFATE PRN MG: 108 AEROSOL, METERED RESPIRATORY (INHALATION) at 20:10

## 2021-09-21 RX ADMIN — ATORVASTATIN CALCIUM SCH MG: 10 TABLET, FILM COATED ORAL at 20:10

## 2021-09-21 RX ADMIN — LATANOPROST SCH DROP: 50 SOLUTION OPHTHALMIC at 18:00

## 2021-09-21 RX ADMIN — ALBUTEROL SULFATE PRN MG: 108 AEROSOL, METERED RESPIRATORY (INHALATION) at 05:31

## 2021-09-21 RX ADMIN — INSULIN GLARGINE SCH UNIT: 100 INJECTION, SOLUTION SUBCUTANEOUS at 20:12

## 2021-09-21 RX ADMIN — APIXABAN SCH MG: 5 TABLET, FILM COATED ORAL at 20:10

## 2021-09-21 RX ADMIN — MONTELUKAST SODIUM SCH MG: 10 TABLET ORAL at 20:10

## 2021-09-21 NOTE — PN
DATE: 09/21/2021

ATTENDING PHYSICIAN:  Dr. Dang.



SUBJECTIVE:  The patient is alert.  She is comfortable.  She is very bright.  

She remembered my name.  This is a good day for her.  This is the best mentally 

I had seen her in the last several months.



OBJECTIVE FINDINGS:

VITAL SIGNS:  Blood pressure is 134/80 mmHg, pulse 88 and regular, temperature 

99.1 degrees Fahrenheit, oxygen saturation 96% on 2 liters.

HEENT:  Head is without trauma.  Pupils are reactive.  Sclerae nonicteric.  

Oropharynx clear.

NECK:  Supple.

LUNGS:  Diminished breath sounds at bases.

CARDIOVASCULAR:  Showed regular heart tones.  No gallops.

ABDOMEN:  Soft.

EXTREMITIES:  Without edema.

NEUROLOGIC:  Function focally intact.  Speech is fluent.

SKIN:  Warm and dry.

PSYCHIATRIC:  Affect is normal.



LABORATORY DATA:  Hemoglobin on admission was 10.1 g/dL.  Electrolytes within 

range.  Creatinine 1.4 mg percent.  Nonfasting blood sugar this morning was 165 

mg/dL.  A CT scan of the chest as noted.



ASSESSMENT:

1.  An 86-year-old female with respiratory distress consistent with pneumonia, 

nursing home acquired.

2.  Acute on chronic diastolic congestive heart failure, compensated.

3.  Localized pain to the coccyx without any recent falls.

4.  Recent hip fracture with open reduction and internal fixation.

5.  Essential hypertension.

6.  Type 2 diabetes.

7.  Profound dementia.

8.  Chronic kidney disease stage III.

9.  History of right lower leg deep venous thrombosis, on chronic 

anticoagulation.



PLAN:

1.  Continue home medications were ordered.

2.  Antibiotics.

3.  Follow up x-ray later this week.







SLIME

DR: Duane   DD: 09/21/2021 11:12

DT: 09/21/2021 15:16   TID: [id number]

CC:     MIGUEL LIPSCOMB MD

## 2021-09-21 NOTE — NUR
PT YELLING OUT TO NURSING STATION MOST OF NIGHT. PT STATED MULTIPLE TIMES IN THE NIGHT THAT 
SHE NEEDED TO URINATE AND THAT SHE FELT AS IF SHE COULDN'T BREATH. UPON FURTHER ASSESSMENT 
PTS BLADDER APPEARED TO BE DISTENDED. BLADDER SCANNER OBTAINED. OVER 999ML OF URINE WAS 
FOUND IN BLADDER. 16F NGO PLACED W/ MINIMAL DISCOMFORT. 1200 ML OF URINE COLLECTED FROM 
NGO IMMEDIATELY FOLLOWING PLACEMENT. PT THEN HAD FURTHER COMPLAINTS OF SOA. EDUCATION WAS 
THEN GIVEN TO PT ON HOW TO PROPERLY USE NC TO RECEIVE ADEQUATE OXYGEN. PT WAS UNABLE TO 
RETURN DEMONSTRATION. OXYGEN SATURATION WAS THEN CHECKED AND PT WAS SITTING AT 86%. PT WAS 
THEN GIVEN PRN BREATHING TREATMENT W/ OXYGEN SATURATION ONLY INCREASING TO 88%. RT WAS THEN 
CONSULTED. PT IS NOW SITTING AT 93% W/ VENTURI MASK AT 30%. PT IS NON COMPLAINT W/ MASK AND 
EDUCATION IS REINFORCED ON IMPORTANCE OF WEARING VENTURI MASK.

## 2021-09-22 VITALS — DIASTOLIC BLOOD PRESSURE: 71 MMHG | SYSTOLIC BLOOD PRESSURE: 106 MMHG

## 2021-09-22 VITALS — SYSTOLIC BLOOD PRESSURE: 87 MMHG | DIASTOLIC BLOOD PRESSURE: 47 MMHG

## 2021-09-22 VITALS — SYSTOLIC BLOOD PRESSURE: 114 MMHG | DIASTOLIC BLOOD PRESSURE: 64 MMHG

## 2021-09-22 VITALS — DIASTOLIC BLOOD PRESSURE: 64 MMHG | SYSTOLIC BLOOD PRESSURE: 104 MMHG

## 2021-09-22 VITALS — SYSTOLIC BLOOD PRESSURE: 109 MMHG | DIASTOLIC BLOOD PRESSURE: 58 MMHG

## 2021-09-22 VITALS — DIASTOLIC BLOOD PRESSURE: 64 MMHG | SYSTOLIC BLOOD PRESSURE: 114 MMHG

## 2021-09-22 RX ADMIN — APIXABAN SCH MG: 5 TABLET, FILM COATED ORAL at 08:39

## 2021-09-22 RX ADMIN — ALBUTEROL SULFATE PRN MG: 108 AEROSOL, METERED RESPIRATORY (INHALATION) at 20:55

## 2021-09-22 RX ADMIN — LATANOPROST SCH DROP: 50 SOLUTION OPHTHALMIC at 18:00

## 2021-09-22 RX ADMIN — LOSARTAN POTASSIUM SCH MG: 25 TABLET, FILM COATED ORAL at 08:43

## 2021-09-22 RX ADMIN — APIXABAN SCH MG: 5 TABLET, FILM COATED ORAL at 20:05

## 2021-09-22 RX ADMIN — LATANOPROST SCH DROP: 50 SOLUTION OPHTHALMIC at 07:00

## 2021-09-22 RX ADMIN — INSULIN GLARGINE SCH UNIT: 100 INJECTION, SOLUTION SUBCUTANEOUS at 20:53

## 2021-09-22 RX ADMIN — ATORVASTATIN CALCIUM SCH MG: 10 TABLET, FILM COATED ORAL at 20:04

## 2021-09-22 RX ADMIN — MONTELUKAST SODIUM SCH MG: 10 TABLET ORAL at 20:04

## 2021-09-22 RX ADMIN — DORZOLAMIDE HYDROCHLORIDE AND TIMOLOL MALEATE SCH DROP: 20; 5 SOLUTION/ DROPS OPHTHALMIC at 08:39

## 2021-09-22 NOTE — RAD
EXAMINATION: Chest radiograph.



VIEWS: Single AP view the chest



COMPARISON: CT from 9/18/2021 9/8/2021



INDICATION:86 years, Female, pneumonia.



FINDINGS: 

Stable mild cardiomegaly. Increased central pulmonary vascular congestion. Tortuous thoracic aorta wi
th calcifications of the aortic arch. Increased interstitial markings and scattered, patchy lower lob
e predominant hazy opacities. No pneumothorax. There is mild blunting of the right costophrenic angle
. Small right pleural effusion. No sizable left pleural effusion.



IMPRESSION:

Findings suggestive of interstitial pulmonary edema with possible superimposed multifocal pneumonia.



Electronically signed by: Rajesh Cosby DO (9/22/2021 4:45 PM) [id number]

## 2021-09-22 NOTE — PN
DATE: 09/22/2021

ATTENDING PHYSICIAN:  Mani Dang MD



SUBJECTIVE:  The patient is calm.  She does not feel well.  She is pleasantly 

confused.  She has got a little bit of wheezing, but this is a chronic problem. 

She has had this problem before.



OBJECTIVE FINDINGS:

VITAL SIGNS:  Blood pressure this morning is 109/58.  She is afebrile, heart 

rate ranging between 100 and 120, irregular.  Oxygen saturation 95% on 4 liters.

HEENT:  Head is without trauma.  Pupils are reactive.  Sclerae nonicteric.  

Oropharynx is clear.

NECK:  Supple, no bruits.

LUNGS:  Diffuse wheezing in the upper airways.

CARDIOVASCULAR:  Showed tachycardic irregular rhythm.  No gallops.

ABDOMEN:  Soft.

EXTREMITIES:  Without edema.

NEUROLOGIC:  Finally, focally intact.  She is pleasantly confused.



ASSESSMENT:

1.  An 86-year-old female with respiratory distress consistent with pneumonia, 

nursing home acquired.

2.  Acute on chronic diastolic heart failure, compensated.

3.  Localized back pain, improved.

4.  Recent hip fracture with open reduction internal fixation.

5.  Essential hypertension.

6.  Paroxysmal atrial fibrillation.

7.  Type 2 diabetes.

8.  Profound dementia.

9.  Chronic kidney disease.

10.  History of right leg deep venous thrombosis, on chronic anticoagulation.



PLAN:

1.  I reviewed her medicine.  She will continue her Levaquin daily.

2.  Diet as tolerated.

3.  Follow up chest x-ray tomorrow morning.

4.  Monitoring blood pressure and heart rate.







ALEKSANDAR/YAMILE

DR: ALEKSANDAR/ruchi   DD: 09/22/2021 12:32

DT: 09/22/2021 21:31   TID: [id number]

CC:     MIGUEL LIPSCOMB MD

## 2021-09-23 VITALS — SYSTOLIC BLOOD PRESSURE: 135 MMHG | DIASTOLIC BLOOD PRESSURE: 66 MMHG

## 2021-09-23 VITALS — DIASTOLIC BLOOD PRESSURE: 67 MMHG | SYSTOLIC BLOOD PRESSURE: 104 MMHG

## 2021-09-23 VITALS — SYSTOLIC BLOOD PRESSURE: 101 MMHG | DIASTOLIC BLOOD PRESSURE: 60 MMHG

## 2021-09-23 VITALS — SYSTOLIC BLOOD PRESSURE: 100 MMHG | DIASTOLIC BLOOD PRESSURE: 57 MMHG

## 2021-09-23 LAB
ANION GAP SERPL CALC-SCNC: 8 MMOL/L (ref 6–14)
BASOPHILS # BLD AUTO: 0 X10^3/UL (ref 0–0.2)
BASOPHILS NFR BLD: 0 % (ref 0–3)
CA-I SERPL ISE-MCNC: 19 MG/DL (ref 7–20)
CALCIUM SERPL-MCNC: 9.2 MG/DL (ref 8.5–10.1)
CHLORIDE SERPL-SCNC: 96 MMOL/L (ref 98–107)
CO2 SERPL-SCNC: 31 MMOL/L (ref 21–32)
CREAT SERPL-MCNC: 1.4 MG/DL (ref 0.6–1)
EOSINOPHIL NFR BLD: 0.2 X10^3/UL (ref 0–0.7)
EOSINOPHIL NFR BLD: 2 % (ref 0–3)
ERYTHROCYTE [DISTWIDTH] IN BLOOD BY AUTOMATED COUNT: 17 % (ref 11.5–14.5)
GFR SERPLBLD BASED ON 1.73 SQ M-ARVRAT: 35.7 ML/MIN
GLUCOSE SERPL-MCNC: 208 MG/DL (ref 70–99)
HCT VFR BLD CALC: 32.3 % (ref 36–47)
HGB BLD-MCNC: 10.5 G/DL (ref 12–15.5)
LYMPHOCYTES # BLD: 1.2 X10^3/UL (ref 1–4.8)
LYMPHOCYTES NFR BLD AUTO: 12 % (ref 24–48)
MAGNESIUM SERPL-MCNC: 1.9 MG/DL (ref 1.8–2.4)
MCH RBC QN AUTO: 29 PG (ref 25–35)
MCHC RBC AUTO-ENTMCNC: 33 G/DL (ref 31–37)
MCV RBC AUTO: 88 FL (ref 79–100)
MONO #: 1.6 X10^3/UL (ref 0–1.1)
MONOCYTES NFR BLD: 17 % (ref 0–9)
NEUT #: 6.8 X10^3UL (ref 1.8–7.7)
NEUTROPHILS NFR BLD AUTO: 69 % (ref 31–73)
PLATELET # BLD AUTO: 465 X10^3/UL (ref 140–400)
POTASSIUM SERPL-SCNC: 3.9 MMOL/L (ref 3.5–5.1)
RBC # BLD AUTO: 3.68 X10^6/UL (ref 3.5–5.4)
SODIUM SERPL-SCNC: 135 MMOL/L (ref 136–145)
WBC # BLD AUTO: 9.8 X10^3/UL (ref 4–11)

## 2021-09-23 RX ADMIN — IPRATROPIUM BROMIDE AND ALBUTEROL SULFATE SCH ML: .5; 3 SOLUTION RESPIRATORY (INHALATION) at 21:33

## 2021-09-23 RX ADMIN — APIXABAN SCH MG: 5 TABLET, FILM COATED ORAL at 20:03

## 2021-09-23 RX ADMIN — DORZOLAMIDE HYDROCHLORIDE AND TIMOLOL MALEATE SCH DROP: 20; 5 SOLUTION/ DROPS OPHTHALMIC at 08:50

## 2021-09-23 RX ADMIN — LATANOPROST SCH DROP: 50 SOLUTION OPHTHALMIC at 07:00

## 2021-09-23 RX ADMIN — ATORVASTATIN CALCIUM SCH MG: 10 TABLET, FILM COATED ORAL at 20:03

## 2021-09-23 RX ADMIN — Medication SCH CAP: at 20:02

## 2021-09-23 RX ADMIN — MONTELUKAST SODIUM SCH MG: 10 TABLET ORAL at 20:02

## 2021-09-23 RX ADMIN — INSULIN GLARGINE SCH UNIT: 100 INJECTION, SOLUTION SUBCUTANEOUS at 21:00

## 2021-09-23 RX ADMIN — Medication SCH CAP: at 08:50

## 2021-09-23 RX ADMIN — LOSARTAN POTASSIUM SCH MG: 25 TABLET, FILM COATED ORAL at 09:00

## 2021-09-23 RX ADMIN — METOPROLOL TARTRATE SCH MG: 25 TABLET, FILM COATED ORAL at 20:03

## 2021-09-23 RX ADMIN — METOPROLOL TARTRATE SCH MG: 25 TABLET, FILM COATED ORAL at 09:16

## 2021-09-23 RX ADMIN — IPRATROPIUM BROMIDE AND ALBUTEROL SULFATE SCH ML: .5; 3 SOLUTION RESPIRATORY (INHALATION) at 16:50

## 2021-09-23 RX ADMIN — APIXABAN SCH MG: 5 TABLET, FILM COATED ORAL at 08:50

## 2021-09-23 RX ADMIN — MONTELUKAST SODIUM SCH MG: 10 TABLET ORAL at 20:07

## 2021-09-23 RX ADMIN — LATANOPROST SCH DROP: 50 SOLUTION OPHTHALMIC at 18:00

## 2021-09-23 NOTE — PDOC2
CARDIAC CONSULT


DATE OF CONSULT


DOS:


DATE: 9/23/21 


TIME: 08:47





REASON FOR CONSULT


Reason for Consult


tachycardia





REFERRING PHYSICIAN


Referring Physician


Dr. Vega





SOURCE


Source:  Chart review, Patient





HPI


History of Present Illness


This is an 85 yo female who presented from nursing facility secondary coccyx 

pain, pressure ulcer. Has a history of MAT. Was noted to be slightly 

tachycardic, which prompted this consult. Patient presently complains of 

shortness of breath. CXR with evidence of CHF. Denies any chest pain, 

palpitations, dizziness.





PAST MEDICAL HISTORY


Past Medical History


Cardiovascular:  AFIB, hyperipidemia


Pulmonary:  Asthma


CENTRAL NERVOUS SYSTEM:  Dementia


Heme/Onc:  Other (DVT)


Musculoskeletal:  Osteoarthritis


Renal/:  Chronic renal insuff


Endocrine:  Diabetes





PAST SURGICAL HISTORY


Past Surgical History:  Hysterectomy





FAMILY HISTORY


Family History:  Family History Unknown





SOCIAL HISTORY


Social History


Smoke:  No


ALCOHOL:  none


Drugs:  None


Lives:  nursing facility





CURRENT MEDICATIONS


Current Medications





Current Medications


Iohexol (Omnipaque 300 Mg/ml) 60 ml 1X  ONCE IV  Last administered on 9/19/21at 

22:32;  Start 9/19/21 at 21:45;  Stop 9/19/21 at 21:48;  Status DC


Acetaminophen/ Hydrocodone Bitart (Lortab 5/325) 1 tab 1X  ONCE PO  Last 

administered on 9/19/21at 21:59;  Start 9/19/21 at 22:00;  Stop 9/19/21 at 

22:01;  Status DC


Info (Do NOT chart on this entry -- for MONITORING) 1 each PRN DAILY  PRN MC SEE

COMMENTS;  Start 9/19/21 at 22:00;  Stop 9/21/21 at 21:59;  Status Cancel


Morphine Sulfate (Morphine 4mg Syringe) 2 mg 1X  ONCE IV  Last administered on 

9/19/21at 22:45;  Start 9/19/21 at 22:45;  Stop 9/19/21 at 22:46;  Status DC


Acetaminophen (Tylenol) 1,000 mg 1X  ONCE PO  Last administered on 9/19/21at 

22:45;  Start 9/19/21 at 22:45;  Stop 9/19/21 at 22:46;  Status DC


Iohexol (Omnipaque 350 Mg/ml) 75 ml 1X  ONCE IV  Last administered on 9/19/21at 

22:47;  Start 9/19/21 at 22:45;  Stop 9/19/21 at 22:46;  Status DC


Info (Do NOT chart on this entry -- for MONITORING) 1 each PRN DAILY  PRN MC SEE

COMMENTS;  Start 9/19/21 at 23:00;  Stop 9/21/21 at 22:59;  Status DC


Midazolam HCl (Versed) 2 mg 1X  ONCE IV  Last administered on 9/20/21at 00:31;  

Start 9/20/21 at 00:30;  Stop 9/20/21 at 00:31;  Status DC


Levofloxacin/ Dextrose 150 ml @  100 mls/hr 1X  ONCE IV  Last administered on 

9/20/21at 02:42;  Start 9/20/21 at 00:45;  Stop 9/20/21 at 02:14;  Status DC


Morphine Sulfate (Morphine 2mg Syringe) 2 mg PRN Q2HR  PRN IVP PAIN Last 

administered on 9/20/21at 21:14;  Start 9/20/21 at 00:45;  Stop 9/21/21 at 

00:44;  Status DC


Midazolam HCl (Versed) 2 mg 1X  ONCE IV  Last administered on 9/20/21at 02:42;  

Start 9/20/21 at 01:45;  Stop 9/20/21 at 01:46;  Status DC


Albuterol/ Ipratropium (Duoneb) 3 ml 1X  ONCE NEB  Last administered on 

9/20/21at 07:50;  Start 9/20/21 at 07:45;  Stop 9/20/21 at 07:46;  Status DC


Diphenhydramine HCl (Benadryl) 25 mg 1X  ONCE PO  Last administered on 9/20/21at

10:50;  Start 9/20/21 at 10:45;  Stop 9/20/21 at 10:46;  Status DC


Nystatin (Nystop) 1 yordan 1X  ONCE TP  Last administered on 9/20/21at 11:08;  

Start 9/20/21 at 10:45;  Stop 9/20/21 at 10:46;  Status DC


Acetaminophen (Tylenol) 650 mg 1X  ONCE PO  Last administered on 9/20/21at 

13:17;  Start 9/20/21 at 13:00;  Stop 9/20/21 at 13:02;  Status DC


Albuterol/ Ipratropium (Duoneb) 3 ml 1X  ONCE NEB  Last administered on 

9/20/21at 13:29;  Start 9/20/21 at 13:00;  Stop 9/20/21 at 13:02;  Status DC


Albuterol Sulfate (Ventolin) 2.5 mg PRN Q6HRS  PRN NEB SHORTNESS OF BREATH Last 

administered on 9/22/21at 20:55;  Start 9/20/21 at 22:45


Lorazepam (Ativan) 1 mg DAILY PO  Last administered on 9/21/21at 11:24;  Start 

9/21/21 at 09:00;  Stop 9/21/21 at 11:39;  Status DC


Apixaban (Eliquis) 5 mg BID PO  Last administered on 9/22/21at 20:05;  Start 

9/21/21 at 21:00


Atorvastatin Calcium (Lipitor) 10 mg QHS PO  Last administered on 9/22/21at 

20:04;  Start 9/21/21 at 21:00


Diltiazem HCl (Cardizem 24hr Cd) 180 mg DAILY08 PO  Last administered on 

9/22/21at 08:44;  Start 9/21/21 at 11:30


Dorzolamide/ Timolol (Cosopt) 1 drop DAILY07 OD  Last administered on 9/22/21at 

08:39;  Start 9/22/21 at 07:00


Latanoprost (Xalatan) 1 drop BID76 OU ;  Start 9/21/21 at 18:00


Losartan Potassium (Cozaar) 25 mg DAILY PO  Last administered on 9/22/21at 

08:43;  Start 9/22/21 at 09:00


Montelukast Sodium (Singulair) 10 mg HS PO  Last administered on 9/22/21at 

20:04;  Start 9/21/21 at 21:00


Insulin Glargine (Lantus Syringe) 30 unit QHS SQ  Last administered on 9/22/21at

20:53;  Start 9/21/21 at 21:00


Lorazepam (Ativan) 1 mg PRN BID  PRN PO ANXIETY / AGITATION Last administered on

9/22/21at 20:05;  Start 9/21/21 at 11:45


Levofloxacin/ Dextrose 150 ml @  100 mls/hr Q48H IV  Last administered on 

9/22/21at 12:48;  Start 9/22/21 at 12:30


Levofloxacin/ Dextrose 150 ml @  100 mls/hr Q24H IV ;  Start 9/22/21 at 12:30;  

Stop 9/22/21 at 12:30;  Status DC


Lactobacillus Rhamnosus (Culturelle) 1 cap BID PO ;  Start 9/23/21 at 09:00





Active Scripts


Active


Reported


Atorvastatin Calcium 10 Mg Tablet 10 Mg PO QHS


Ativan (Lorazepam) 1 Mg Tablet 1 Tab PO DAILY MDD 3 Tablet(s) 30 Days


Colace (Docusate Sodium) 100 Mg Capsule 1 Cap PO BID 30 Days


Hydrocodone-Apap 5-325  ** (Hydrocodone Bit/Acetaminophen) 1 Each Tablet 1 Tab 

PO PRN Q6HRS PRN


Lantus Solostar (Insulin Glargine,Hum.rec.anlog) 100 Unit/1 Ml Insuln.pen 30 

Unit SQ QHS


Montelukast Sodium Tablet ** (Montelukast Sodium) 10 Mg Tablet 10 Mg PO HS


Eliquis (Apixaban) 5 Mg Tablet 5 Mg PO BID


Albuterol Sulfate Neb Soln (Albuterol Sulfate) 1.25 Mg/3 Ml Vial.neb 1 Vial NEB 

Q6HRS


Proair Hfa Inhaler (Albuterol Sulfate) 8.5 Gm Hfa.aer.ad 2 Puff IH PRN Q4-6HRS 

PRN 21 Days


Dorzolamide-Timolol Eye Drops (Dorzolamide Hcl/Timolol Maleat) 10 Ml Drops 1 

Drop OD DAILY07


Xalatan (Latanoprost) 2.5 Ml Drops 1 Drop OU BID76


Triamterene-Hctz 37.5-25 Mg Tb (Triamterene/Hydrochlorothiazid) 1 Each Tablet 1 

Tab PO DAILY


Losartan Potassium ** (Losartan Potassium) 25 Mg Tablet 7.5 Mg PO DAILY


Cartia Xt (Diltiazem Hcl) 180 Mg Cap.er.24h 180 Mg PO DAILY08





ALLERGIES


Allergies:  


Coded Allergies:  


     ibuprofen (Verified  Allergy, Severe, Anaphylaxis, 8/11/21)


     Penicillins (Verified  Allergy, Intermediate, 8/12/21)





ROS


Review of Systems


14 point ROS conducted with pertinent positives noted above in HPI, although 

limited due to dementia.





PHYSICAL EXAM


Physical Exam


General:  Alert, Cooperative, No acute distress, Other (oriented to person only)


Lungs:  Other (diminished bases, fine expiratory wheezes)


Heart:  Regular rate (MAT)


Abdomen:  Soft


Extremities:  No edema, Normal pulses


Skin:  No rashes, No breakdown


Neuro:  Normal speech, Sensation intact


Psych/Mental Status:  Mood NL, Other (confused)


MUSCULOSKELETAL:  Osteoarthritic changes both hands





VITALS


Vital Signs





Vital Signs








  Date Time  Temp Pulse Resp B/P (MAP) Pulse Ox O2 Delivery O2 Flow Rate FiO2


 


9/22/21 23:09  118      


 


9/22/21 20:33 98.1  22 106/71 (83) 99   


 


9/22/21 20:00      Nasal Cannula 3.0 











LABS


LABS





Laboratory Tests








Test


 9/21/21


20:08 9/22/21


07:59 9/22/21


12:07 9/22/21


17:04


 


Glucose (Fingerstick)


 273 mg/dL


(70-99) 72 mg/dL


(70-99) 133 mg/dL


(70-99) 217 mg/dL


(70-99)


 


Test


 9/23/21


08:02 9/23/21


08:38 


 





 


Glucose (Fingerstick)


 45 mg/dL


(70-99) 117 mg/dL


(70-99) 


 














EKG


EKG


13-day Holter Monitor 5/11/21


Asymptomatic increased ventricular ectopic activity totaling 6.4% with included 

some triplets and 4 beat run on NSVT


Increased asymptomatic supraventricular ectopic activity which included multiple

short episodes of PAT up to 13 beats long


No AFIB





ECHOCARDIOGRAM


Echocardiogram





Echo 5/21/21


Mild LVH


Normal LV systolic function with EF 55-60%


Grade II diastolic dysfunction


Left atrium is mildly dilated


Mild aortic stenosis


Mild mitral annular calcification


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





STRESS TEST


Stress Test





MPI 5/21/21


Normal perfusion with distal small breast attenuation artifact


LVEF normal 


No stress induced EKG changes





ASSESSMENT/PLAN


Assessment/Plan


1.  Acute respiratory failure with CHF and possible PNA 


2.  Tachyarrhythmia; EKG shows MAT. Tele with MAT versus AFIB, rate 

intermittently elevated. On Cardizem for rate control. [facility] Recent event monitor with SR, PAT. No AFIB as noted above


3.  Acute on chronic diastolic CHF; Recent echo with preserved LV systolic 

function 


4.  Hypertension; controlled 


5.  Hyperlipidmia; statin 


6.  Diabetes, II


7.  CKD; Cr stable 


8.  Dementia 


9.  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 


10.  Coccyx ulcer








Recommendations





Continue Cardizem


Add low-dose metoprolol for rate control as blood pressure is low end


Repeat labs 


Diuresis with monitoring of renal function 


Eliquis for h/o DVT


Supportive care











NELLY ACUÑA           Sep 23, 2021 09:03

## 2021-09-23 NOTE — PN
DATE: 09/23/2021

SUBJECTIVE:  The patient is resting, slightly propped up in bed, in no apparent 

distress, sleepy, but arousable.  She apparently is more awake, alert today, 

less confused according to the nursing staff.



PHYSICAL EXAMINATION:

GENERAL:  When I examined her, she was pale, but no jaundice, cyanosis, no 

lymphadenopathy, no thyromegaly, no jugular venous distention.  No limb edema.

VITAL SIGNS:  Her heart rate was 96, blood pressure 100/57, temperature was 

99.1, respiratory rate 20, and oxygen saturation was 96% on 4 liters of oxygen 

by nasal cannula.

HEAD, EYES, EARS, NOSE AND THROAT:  Normocephalic, atraumatic.

NECK:  Supple.

HEART:  Showed normal first and second heart sounds, no gallop, rub or murmur.

CHEST:  Clear to auscultation, no crepitation or rhonchi.

ABDOMEN:  Distended, soft, nontender.  No guarding or rigidity.  No 

organomegaly.  All hernial orifice intact.  Bowel sounds normal.

NEUROLOGIC:  She was demented, but without any obvious lateralizing sign.



Her intake over the last 24 hours was 840, output was 1200.



LABORATORY DATA:  As of this morning, her white cell count was 9800, hemoglobin 

10.5, hematocrit 32, MCV 88, and platelet count of 465,000.  Her chemistry 

showed a serum sodium 135, potassium 3.9, chloride 96, bicarbonate 31, anion gap

of 8, BUN 19, creatinine 1.4.  Estimated GFR was 35 mL per minute.  Her glucose 

was 208, calcium was 9.2, magnesium was 1.9.  Beta-natriuretic peptide was 

20,237.  Her chest x-ray done yesterday showed findings suggestive of 

interstitial pulmonary edema and possible superimposed multinodular pneumonia.



ASSESSMENT:

1.  Acute respiratory failure, likely due to congestive heart failure and 

possible pneumonia.

2.  Acute-on-chronic diastolic congestive heart failure with most recent 

echocardiogram showing preserved left ventricular systolic function.

3.  Tachyarrhythmia, likely multifocal atrial tachycardia versus atrial 

fibrillation.  She is currently on Cardizem and low-dose metoprolol was added by

the Cardiology team.

4.  Hyperlipidemia.

5.  Type 2 diabetes mellitus.

6.  Chronic kidney disease.

7.  Dementia.

8.  History of deep venous thrombosis on Eliquis therapy.

9.  Coccygeal decubitus ulcer.



PLAN:  My plan is to continue with IV levofloxacin.  We will evaluate the need 

for diuretics on a daily basis.  I will add DuoNeb as she is clearly very wheezy

and decide on further management accordingly.







AMM/GIULIA

DR: AMM/nts   DD: 09/23/2021 14:25

DT: 09/23/2021 22:55   TID: [id number]

## 2021-09-24 VITALS — SYSTOLIC BLOOD PRESSURE: 111 MMHG | DIASTOLIC BLOOD PRESSURE: 54 MMHG

## 2021-09-24 VITALS — SYSTOLIC BLOOD PRESSURE: 96 MMHG | DIASTOLIC BLOOD PRESSURE: 54 MMHG

## 2021-09-24 VITALS — SYSTOLIC BLOOD PRESSURE: 116 MMHG | DIASTOLIC BLOOD PRESSURE: 57 MMHG

## 2021-09-24 VITALS — SYSTOLIC BLOOD PRESSURE: 129 MMHG | DIASTOLIC BLOOD PRESSURE: 65 MMHG

## 2021-09-24 LAB
ALBUMIN SERPL-MCNC: 2.2 G/DL (ref 3.4–5)
ALBUMIN/GLOB SERPL: 0.5 {RATIO} (ref 1–1.7)
ALP SERPL-CCNC: 77 U/L (ref 46–116)
ALT SERPL-CCNC: 15 U/L (ref 14–59)
ANION GAP SERPL CALC-SCNC: 6 MMOL/L (ref 6–14)
AST SERPL-CCNC: 18 U/L (ref 15–37)
BILIRUB SERPL-MCNC: 0.4 MG/DL (ref 0.2–1)
BUN/CREAT SERPL: 15 (ref 6–20)
CA-I SERPL ISE-MCNC: 20 MG/DL (ref 7–20)
CALCIUM SERPL-MCNC: 8.7 MG/DL (ref 8.5–10.1)
CHLORIDE SERPL-SCNC: 97 MMOL/L (ref 98–107)
CO2 SERPL-SCNC: 29 MMOL/L (ref 21–32)
CREAT SERPL-MCNC: 1.3 MG/DL (ref 0.6–1)
ERYTHROCYTE [DISTWIDTH] IN BLOOD BY AUTOMATED COUNT: 17.3 % (ref 11.5–14.5)
GFR SERPLBLD BASED ON 1.73 SQ M-ARVRAT: 38.8 ML/MIN
GLOBULIN SER-MCNC: 4.3 G/DL (ref 2.2–3.8)
GLUCOSE SERPL-MCNC: 207 MG/DL (ref 70–99)
HCT VFR BLD CALC: 31.3 % (ref 36–47)
HGB BLD-MCNC: 10.1 G/DL (ref 12–15.5)
MCH RBC QN AUTO: 28 PG (ref 25–35)
MCHC RBC AUTO-ENTMCNC: 32 G/DL (ref 31–37)
MCV RBC AUTO: 87 FL (ref 79–100)
PLATELET # BLD AUTO: 421 X10^3/UL (ref 140–400)
POTASSIUM SERPL-SCNC: 4 MMOL/L (ref 3.5–5.1)
PROT SERPL-MCNC: 6.5 G/DL (ref 6.4–8.2)
RBC # BLD AUTO: 3.59 X10^6/UL (ref 3.5–5.4)
SODIUM SERPL-SCNC: 132 MMOL/L (ref 136–145)
WBC # BLD AUTO: 10.3 X10^3/UL (ref 4–11)

## 2021-09-24 RX ADMIN — APIXABAN SCH MG: 5 TABLET, FILM COATED ORAL at 08:29

## 2021-09-24 RX ADMIN — IPRATROPIUM BROMIDE AND ALBUTEROL SULFATE SCH ML: .5; 3 SOLUTION RESPIRATORY (INHALATION) at 20:36

## 2021-09-24 RX ADMIN — Medication SCH CAP: at 08:29

## 2021-09-24 RX ADMIN — LATANOPROST SCH DROP: 50 SOLUTION OPHTHALMIC at 07:00

## 2021-09-24 RX ADMIN — MONTELUKAST SODIUM SCH MG: 10 TABLET ORAL at 20:56

## 2021-09-24 RX ADMIN — INSULIN GLARGINE SCH UNIT: 100 INJECTION, SOLUTION SUBCUTANEOUS at 20:57

## 2021-09-24 RX ADMIN — IPRATROPIUM BROMIDE AND ALBUTEROL SULFATE SCH ML: .5; 3 SOLUTION RESPIRATORY (INHALATION) at 06:11

## 2021-09-24 RX ADMIN — METOPROLOL TARTRATE SCH MG: 25 TABLET, FILM COATED ORAL at 20:57

## 2021-09-24 RX ADMIN — IPRATROPIUM BROMIDE AND ALBUTEROL SULFATE SCH ML: .5; 3 SOLUTION RESPIRATORY (INHALATION) at 15:50

## 2021-09-24 RX ADMIN — ATORVASTATIN CALCIUM SCH MG: 10 TABLET, FILM COATED ORAL at 20:56

## 2021-09-24 RX ADMIN — DORZOLAMIDE HYDROCHLORIDE AND TIMOLOL MALEATE SCH DROP: 20; 5 SOLUTION/ DROPS OPHTHALMIC at 08:29

## 2021-09-24 RX ADMIN — ALBUTEROL SULFATE PRN MG: 108 AEROSOL, METERED RESPIRATORY (INHALATION) at 04:02

## 2021-09-24 RX ADMIN — LOSARTAN POTASSIUM SCH MG: 25 TABLET, FILM COATED ORAL at 08:29

## 2021-09-24 RX ADMIN — LATANOPROST SCH DROP: 50 SOLUTION OPHTHALMIC at 18:00

## 2021-09-24 RX ADMIN — Medication SCH CAP: at 20:56

## 2021-09-24 RX ADMIN — IPRATROPIUM BROMIDE AND ALBUTEROL SULFATE SCH ML: .5; 3 SOLUTION RESPIRATORY (INHALATION) at 10:41

## 2021-09-24 RX ADMIN — METOPROLOL TARTRATE SCH MG: 25 TABLET, FILM COATED ORAL at 08:30

## 2021-09-24 RX ADMIN — APIXABAN SCH MG: 5 TABLET, FILM COATED ORAL at 20:57

## 2021-09-24 NOTE — NUR
Awake, confused, calling out all night thus far; oriented to self only, unable to redirect 
and/or reorient; removes oxygen, heart monitor, and blankets whenever left alone even for 
brief time; calls out for her mother or other family members often; persistent attempts by 
all staff to comfort and calm pt ineffective thus far; VSS, telemetry monitor shows afib 
with rate 100-120 bpm, afebrile; alberto cath draining clear yellow urine with no problems 
noted; bed alarm active and full side rails up for safety.

## 2021-09-24 NOTE — NUR
discharge note 

pt discharge at 1525 via wheelchair . pt given written and verbal information with verbal 
statement of understanding received.

## 2021-09-24 NOTE — PDOC
DATE OF SERVICE:


DOS:


DATE: 9/24/21 


TIME: 13:38





SUBJECTIVE:


Patient seen and examined





OBJECTIVE:


Problems:


Problems


Medical Problems:


(1) Dementia


Status: Acute  





(2) Pneumonia


Status: Acute  





Chest.  Mildly decreased breath sounds bilaterally.


CV.  Regular rhythm.  1/6 systolic murmur.


Abdomen.  Soft with no tenderness.


Vital Signs/I&O:





                                   Vital Signs








  Date Time  Temp Pulse Resp B/P (MAP) Pulse Ox O2 Delivery O2 Flow Rate FiO2


 


9/24/21 10:41     96 Nasal Cannula 3.0 


 


9/24/21 10:32 97.3 105 20 111/54 (73)    














                                    I & O   


 


 9/23/21 9/23/21 9/24/21





 15:00 23:00 07:00


 


Intake Total 600 ml 240 ml 50 ml


 


Output Total  550 ml 450 ml


 


Balance 600 ml -310 ml -400 ml








Labs:





                                Laboratory Tests








Test


 9/23/21


16:36 9/23/21


19:21 9/24/21


06:50 9/24/21


07:26


 


Glucose (Fingerstick)


 224 mg/dL


(70-99)  H 321 mg/dL


(70-99)  H 


 175 mg/dL


(70-99)  H


 


White Blood Count


 


 


 10.3 x10^3/uL


(4.0-11.0) 





 


Red Blood Count


 


 


 3.59 x10^6/uL


(3.50-5.40) 





 


Hemoglobin


 


 


 10.1 g/dL


(12.0-15.5)  L 





 


Hematocrit


 


 


 31.3 %


(36.0-47.0)  L 





 


Mean Corpuscular Volume


 


 


 87 fL ()


 





 


Mean Corpuscular Hemoglobin   28 pg (25-35)   


 


Mean Corpuscular Hemoglobin


Concent 


 


 32 g/dL


(31-37) 





 


Red Cell Distribution Width


 


 


 17.3 %


(11.5-14.5)  H 





 


Platelet Count


 


 


 421 x10^3/uL


(140-400)  H 





 


Sodium Level


 


 


 132 mmol/L


(136-145)  L 





 


Potassium Level


 


 


 4.0 mmol/L


(3.5-5.1) 





 


Chloride Level


 


 


 97 mmol/L


()  L 





 


Carbon Dioxide Level


 


 


 29 mmol/L


(21-32) 





 


Anion Gap   6 (6-14)   


 


Blood Urea Nitrogen


 


 


 20 mg/dL


(7-20) 





 


Creatinine


 


 


 1.3 mg/dL


(0.6-1.0)  H 





 


Estimated GFR


(Cockcroft-Gault) 


 


 38.8  


 





 


BUN/Creatinine Ratio   15 (6-20)   


 


Glucose Level


 


 


 207 mg/dL


(70-99)  H 





 


Calcium Level


 


 


 8.7 mg/dL


(8.5-10.1) 





 


Total Bilirubin


 


 


 0.4 mg/dL


(0.2-1.0) 





 


Aspartate Amino Transferase


(AST) 


 


 18 U/L (15-37)


 





 


Alanine Aminotransferase (ALT)


 


 


 15 U/L (14-59)


 





 


Alkaline Phosphatase


 


 


 77 U/L


() 





 


Total Protein


 


 


 6.5 g/dL


(6.4-8.2) 





 


Albumin


 


 


 2.2 g/dL


(3.4-5.0)  L 





 


Albumin/Globulin Ratio


 


 


 0.5 (1.0-1.7)


L 





 


Test


 9/24/21


11:24 


 


 





 


Glucose (Fingerstick)


 196 mg/dL


(70-99)  H 


 


 











Physical Exam:


As above.





ASSESSMENT:


Acute respiratory failure with CHF and possible PNA.  The patient is feeling 

better today.  Would continue present treatment.


Tachyarrhythmia; EKG shows MAT. Tele with MAT versus AFIB, rate intermittently 

elevated. On Cardizem for rate control. [facility] 

Recent event monitor with [facility].


Acute on chronic diastolic CHF; Recent echo with preserved LV systolic function.

 Improved today.


Hypertension; low-dose beta-blocker.


Hyperlipidmia; statin 


Diabetes, II


CKD; Cr stable 


Dementia 


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





Justification of Admission:


Justification of Admission:


Justification of Admission Dx:  Yes











BEKAH ROBERT MD            Sep 24, 2021 13:41

## 2021-09-24 NOTE — RAD
XR CHEST 1V



History: Reason: FOLLOW UP ON PNEUMONIA AND CHF / Spl. Instructions:  / History: 



Comparison: September 22, 2021



Findings:

Increased bibasilar opacities, left greater than right. Possible small bilateral pleural effusions. N
o pneumothorax. Unchanged heart size.



Impression: 

1.  Increased bibasilar opacities, left greater than right.



Electronically signed by: Arthur Mabry DO (9/24/2021 1:55 PM) [id number]

## 2021-09-25 VITALS — SYSTOLIC BLOOD PRESSURE: 95 MMHG | DIASTOLIC BLOOD PRESSURE: 60 MMHG

## 2021-09-25 VITALS — DIASTOLIC BLOOD PRESSURE: 51 MMHG | SYSTOLIC BLOOD PRESSURE: 87 MMHG

## 2021-09-25 VITALS — DIASTOLIC BLOOD PRESSURE: 64 MMHG | SYSTOLIC BLOOD PRESSURE: 115 MMHG

## 2021-09-25 VITALS — SYSTOLIC BLOOD PRESSURE: 80 MMHG | DIASTOLIC BLOOD PRESSURE: 40 MMHG

## 2021-09-25 VITALS — DIASTOLIC BLOOD PRESSURE: 53 MMHG | SYSTOLIC BLOOD PRESSURE: 92 MMHG

## 2021-09-25 VITALS — SYSTOLIC BLOOD PRESSURE: 101 MMHG | DIASTOLIC BLOOD PRESSURE: 69 MMHG

## 2021-09-25 LAB
ANION GAP SERPL CALC-SCNC: 6 MMOL/L (ref 6–14)
CA-I SERPL ISE-MCNC: 20 MG/DL (ref 7–20)
CALCIUM SERPL-MCNC: 8.5 MG/DL (ref 8.5–10.1)
CHLORIDE SERPL-SCNC: 99 MMOL/L (ref 98–107)
CO2 SERPL-SCNC: 29 MMOL/L (ref 21–32)
CREAT SERPL-MCNC: 1.3 MG/DL (ref 0.6–1)
ERYTHROCYTE [DISTWIDTH] IN BLOOD BY AUTOMATED COUNT: 17 % (ref 11.5–14.5)
GFR SERPLBLD BASED ON 1.73 SQ M-ARVRAT: 38.8 ML/MIN
GLUCOSE SERPL-MCNC: 60 MG/DL (ref 70–99)
HCT VFR BLD CALC: 29.4 % (ref 36–47)
HGB BLD-MCNC: 9.4 G/DL (ref 12–15.5)
MCH RBC QN AUTO: 28 PG (ref 25–35)
MCHC RBC AUTO-ENTMCNC: 32 G/DL (ref 31–37)
MCV RBC AUTO: 86 FL (ref 79–100)
PLATELET # BLD AUTO: 374 X10^3/UL (ref 140–400)
POTASSIUM SERPL-SCNC: 3.7 MMOL/L (ref 3.5–5.1)
RBC # BLD AUTO: 3.41 X10^6/UL (ref 3.5–5.4)
SODIUM SERPL-SCNC: 134 MMOL/L (ref 136–145)
WBC # BLD AUTO: 10.9 X10^3/UL (ref 4–11)

## 2021-09-25 RX ADMIN — IPRATROPIUM BROMIDE AND ALBUTEROL SULFATE SCH ML: .5; 3 SOLUTION RESPIRATORY (INHALATION) at 16:16

## 2021-09-25 RX ADMIN — METOPROLOL TARTRATE SCH MG: 25 TABLET, FILM COATED ORAL at 08:10

## 2021-09-25 RX ADMIN — TRAZODONE HYDROCHLORIDE SCH MG: 50 TABLET, FILM COATED ORAL at 19:37

## 2021-09-25 RX ADMIN — ATORVASTATIN CALCIUM SCH MG: 10 TABLET, FILM COATED ORAL at 19:37

## 2021-09-25 RX ADMIN — LIDOCAINE SCH PATCH: 50 PATCH CUTANEOUS at 16:33

## 2021-09-25 RX ADMIN — LATANOPROST SCH DROP: 50 SOLUTION OPHTHALMIC at 05:59

## 2021-09-25 RX ADMIN — METOPROLOL TARTRATE SCH MG: 25 TABLET, FILM COATED ORAL at 19:45

## 2021-09-25 RX ADMIN — INSULIN GLARGINE SCH UNIT: 100 INJECTION, SOLUTION SUBCUTANEOUS at 20:29

## 2021-09-25 RX ADMIN — APIXABAN SCH MG: 5 TABLET, FILM COATED ORAL at 19:36

## 2021-09-25 RX ADMIN — IPRATROPIUM BROMIDE AND ALBUTEROL SULFATE SCH ML: .5; 3 SOLUTION RESPIRATORY (INHALATION) at 20:09

## 2021-09-25 RX ADMIN — LATANOPROST SCH DROP: 50 SOLUTION OPHTHALMIC at 18:05

## 2021-09-25 RX ADMIN — MONTELUKAST SODIUM SCH MG: 10 TABLET ORAL at 19:37

## 2021-09-25 RX ADMIN — DORZOLAMIDE HYDROCHLORIDE AND TIMOLOL MALEATE SCH DROP: 20; 5 SOLUTION/ DROPS OPHTHALMIC at 05:59

## 2021-09-25 RX ADMIN — Medication SCH EA: at 19:45

## 2021-09-25 RX ADMIN — DEXTROSE SCH MLS/HR: 10 SOLUTION INTRAVENOUS at 14:44

## 2021-09-25 RX ADMIN — LOSARTAN POTASSIUM SCH MG: 25 TABLET, FILM COATED ORAL at 08:12

## 2021-09-25 RX ADMIN — IPRATROPIUM BROMIDE AND ALBUTEROL SULFATE SCH ML: .5; 3 SOLUTION RESPIRATORY (INHALATION) at 10:41

## 2021-09-25 RX ADMIN — Medication SCH CAP: at 19:36

## 2021-09-25 RX ADMIN — APIXABAN SCH MG: 5 TABLET, FILM COATED ORAL at 08:10

## 2021-09-25 RX ADMIN — Medication SCH CAP: at 08:11

## 2021-09-25 RX ADMIN — IPRATROPIUM BROMIDE AND ALBUTEROL SULFATE SCH ML: .5; 3 SOLUTION RESPIRATORY (INHALATION) at 05:21

## 2021-09-25 NOTE — PN
DATE: 09/25/2021

SUBJECTIVE:  The patient is very sleepy, lethargic, transpired that she has not 

eaten her breakfast or lunch and she is hypoglycemic.  Her blood sugar when I 

saw was only 48 and therefore, I did consult the plan of discharge.  I will 

start her on D10 at 30 mL per hour.



PHYSICAL EXAMINATION:

GENERAL:  When I examined her, she was somewhat pale, but not jaundiced or 

cyanosed, no lymphadenopathy, no thyromegaly, no jugular venous distention.  No 

limb edema.

VITAL SIGNS:  Her heart rate was 82, blood pressure was 92/53, temperature 97.7,

respiratory rate 20, and oxygen saturation was 97%.

HEAD, EYES, EARS, NOSE, AND THROAT:  Normocephalic, atraumatic.

NECK:  Supple.

HEART:  Showed normal first and second heart sounds, no gallop, rub, or murmur.

CHEST:  Clear to auscultation, no crepitation or rhonchi.

ABDOMEN:  Soft, nontender.

NEUROLOGIC:  She is very lethargic, but arousable.



Her intake was 890, output was 1000.



LABORATORY WORK:  This morning showed a white cell count of 10,900, hemoglobin 

9.4, hematocrit 29, MCV 86, and platelet count of 374,000.  Her chemistry showed

a serum sodium 134, potassium 3.7, chloride 99, bicarbonate 29, anion gap of 6, 

BUN 20, creatinine 1.3.  Estimated GFR was 39 mL per minute.  Her glucose has 

been consistently low from early this morning, calcium was 8.5, and beta 

natriuretic peptide was down to 17,799.



ASSESSMENT:

1.  Altered mental status and likely due to hypoglycemia, we should start her on

D10W at 30 mL. We will monitor her blood sugar closely.

2.  Her chest x-ray showed that she has worsening infiltrate, left greater than 

right.

3.  Acute hypoxic respiratory failure likely due to acute on chronic diastolic 

congestive heart failure with possible pneumonia.

4.  Acute on chronic diastolic congestive heart failure.  Her most recent 

echocardiogram showed preserved left ventricular systolic function.  Her BMP is 

down from 20,000 to 17,000.

5. Tachyarrhythmia, likely multifocal atrial tachycardia versus atrial 

fibrillation.  She is currently on Cardizem and low dose metoprolol.

6.  Hyperlipidemia.

7.  Type 2 diabetes mellitus.

8.  Chronic kidney disease.

9.  Dementia.

10.  History of deep vein thrombosis, which she is on Eliquis.

11.  Coccygeal decubitus ulcer.



PLAN:  My plan is to see if we can cut down some of her medications as she is 

borderline hypotensive.  She is on losartan, diltiazem as well as metoprolol.  I

will discontinue the losartan and we can cut down also the metoprolol to 12.5.







AMM/MAURIZIO

DR: AMM/nts   DD: 09/25/2021 13:41

DT: 09/25/2021 22:43   TID: [id number]

## 2021-09-25 NOTE — PDOC
DATE OF SERVICE:


DOS:


DATE: 9/25/21 


TIME: 14:17





SUBJECTIVE:


Patient seen and examined





OBJECTIVE:


Problems:


Problems


Medical Problems:


(1) Dementia


Status: Acute  





(2) Pneumonia


Status: Acute  





Vital Signs/I&O:





                                   Vital Signs








  Date Time  Temp Pulse Resp B/P (MAP) Pulse Ox O2 Delivery O2 Flow Rate FiO2


 


9/25/21 11:19 97.7 82 20 92/53 (66) 97 Nasal Cannula  


 


9/25/21 10:41       3.0 














                                    I & O   


 


 9/24/21 9/24/21 9/25/21





 15:00 23:00 07:00


 


Intake Total 600 ml  150 ml


 


Output Total  300 ml 450 ml


 


Balance 600 ml -300 ml -300 ml








Labs:





                                Laboratory Tests








Test


 9/24/21


17:00 9/24/21


20:09 9/25/21


06:35 9/25/21


07:45


 


Glucose (Fingerstick)


 207 mg/dL


(70-99)  H 290 mg/dL


(70-99)  H 


 52 mg/dL


(70-99)  L


 


White Blood Count


 


 


 10.9 x10^3/uL


(4.0-11.0) 





 


Red Blood Count


 


 


 3.41 x10^6/uL


(3.50-5.40)  L 





 


Hemoglobin


 


 


 9.4 g/dL


(12.0-15.5)  L 





 


Hematocrit


 


 


 29.4 %


(36.0-47.0)  L 





 


Mean Corpuscular Volume


 


 


 86 fL ()


 





 


Mean Corpuscular Hemoglobin   28 pg (25-35)   


 


Mean Corpuscular Hemoglobin


Concent 


 


 32 g/dL


(31-37) 





 


Red Cell Distribution Width


 


 


 17.0 %


(11.5-14.5)  H 





 


Platelet Count


 


 


 374 x10^3/uL


(140-400) 





 


Sodium Level


 


 


 134 mmol/L


(136-145)  L 





 


Potassium Level


 


 


 3.7 mmol/L


(3.5-5.1) 





 


Chloride Level


 


 


 99 mmol/L


() 





 


Carbon Dioxide Level


 


 


 29 mmol/L


(21-32) 





 


Anion Gap   6 (6-14)   


 


Blood Urea Nitrogen


 


 


 20 mg/dL


(7-20) 





 


Creatinine


 


 


 1.3 mg/dL


(0.6-1.0)  H 





 


Estimated GFR


(Cockcroft-Gault) 


 


 38.8  


 





 


Glucose Level


 


 


 60 mg/dL


(70-99)  L 





 


Calcium Level


 


 


 8.5 mg/dL


(8.5-10.1) 





 


NT-Pro-B-Type Natriuretic


Peptide 


 


 54640 pg/mL


(0-449)  H 





 


Test


 9/25/21


08:22 9/25/21


12:05 9/25/21


13:36 





 


Glucose (Fingerstick)


 105 mg/dL


(70-99)  H 56 mg/dL


(70-99)  L 49 mg/dL


(70-99)  L 











Physical Exam:


Chest.  Mildly decreased breath sounds.





CV.  Regular rate and rhythm.





Abdomen.  Soft.





ASSESSMENT:


Acute respiratory failure with CHF and possible PNA.  The patient continues to 

feel better.  Would continue present treatment.


Tachyarrhythmia; EKG shows MAT. Tele with MAT versus AFIB, rate intermittently 

elevated. On Cardizem for rate control. [facility] 

Recent event monitor with [facility].


Acute on chronic diastolic CHF; Recent echo with preserved LV systolic function.

 Improved today.


Hypertension; low-dose beta-blocker.


Hyperlipidmia; statin 


Diabetes, II


CKD; Cr stable 


Dementia 


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





Justification of Admission:


Justification of Admission:


Justification of Admission Dx:  Yes











BEKAH ROBERT MD            Sep 25, 2021 14:19

## 2021-09-25 NOTE — PN
DATE: 09/24/2021

SUBJECTIVE:  The patient is resting almost flat in bed, in no apparent 

respiratory distress.  She is very sleepy, but arousable.  According to nursing 

staff, she does not sleep at nighttime, restless and agitated, but sleeps 

usually during daytime.  She was able to sit in the chair this morning and has 

eaten her breakfast and did work with physical therapy. When I saw her this 

afternoon, she was extremely sleepy.



PHYSICAL EXAMINATION:

GENERAL:  When I examined her, she was pale, but not jaundiced or cyanosed, no 

lymphadenopathy, no thyromegaly, no jugular venous distention.  No limb edema.

VITAL SIGNS:  Her heart rate was 105, blood pressure is 111/54, temperature 

97.3, respiratory rate 20, and oxygen saturation was 96% on 3 liters of oxygen. 

HEAD, EYES, EARS, NOSE, AND THROAT:  Normocephalic, atraumatic.

NECK:  Supple.

HEART:  Showed normal first and second heart sounds.  No gallop, rub, or murmur.

CHEST:  Clear to auscultation, no crepitation or rhonchi.

ABDOMEN:  Distended, soft, nontender, no guarding or rigidity.  No organomegaly.

NEUROLOGIC:  She is demented, but without any obvious lateralizing sign.



Her intake over the last 24 hours was 1890, output was 1000.



LABORATORY DATA:  As of this morning, her white cell count is 10,000, hemoglobin

10, hematocrit 31, MCV 87, and platelet count 421,000.  Her chemistry showed a 

serum sodium of 132, potassium 4, chloride 97, bicarbonate 29, anion gap of 6, 

BUN 20, creatinine 1.3.  Estimated GFR was 38 mL per minute.  Her glucose 207, 

calcium was 8.7.  Total bilirubin, AST, ALT, alkaline phosphatase were normal.  

Total protein 6.5, albumin 2.2.  Urinalysis essentially unremarkable.  Her 

coronavirus by PCR was negative.  Her chest x-ray was done this morning.  The 

report is still pending. As of her most recent chest x-ray, it showed that the 

patient has stable mild cardiomegaly, increased central pulmonary vascular 

congestion, tortuous thoracic aorta with calcification of the aortic arch, 

increased interstitial marking, and scattered patchy lower lobe predominantly 

hazy opacities, no pneumothorax.  There is blunting of the right costophrenic 

angle, small right-sided pleural effusion, no sizable left-sided pleural 

effusion.



ASSESSMENT:

1.  Acute hypoxic respiratory failure likely due to acute on chronic diastolic 

congestive heart failure and possible pneumonia.

2.  Acute on chronic diastolic congestive heart failure.  Her most recent 

echocardiogram showing a preserved left ventricular systolic function.

3.  Tachyarrhythmia, likely multifocal, atrial tachycardia versus atrial 

fibrillation.  She is currently on Cardizem and low dose metoprolol was added by

the Cardiology team.

4.  Hyperlipidemia.

5.  Type 2 diabetes mellitus.

6.  Chronic kidney disease.

7.  Dementia.

8.  History of deep vein thrombosis, on Eliquis.

9.  Coccygeal decubitus ulcer.



PLAN:  My plan is to continue with IV levofloxacin.  I will start her on Zyprexa

at nighttime and she will be discharged back to [facility] tomorrow.







AMM/MAURIZIO

DR: AMM/nts   DD: 09/24/2021 13:22

DT: 09/25/2021 01:09   TID: [id number]

## 2021-09-26 VITALS — SYSTOLIC BLOOD PRESSURE: 102 MMHG | DIASTOLIC BLOOD PRESSURE: 63 MMHG

## 2021-09-26 VITALS — DIASTOLIC BLOOD PRESSURE: 62 MMHG | SYSTOLIC BLOOD PRESSURE: 97 MMHG

## 2021-09-26 VITALS — SYSTOLIC BLOOD PRESSURE: 87 MMHG | DIASTOLIC BLOOD PRESSURE: 44 MMHG

## 2021-09-26 VITALS — SYSTOLIC BLOOD PRESSURE: 112 MMHG | DIASTOLIC BLOOD PRESSURE: 68 MMHG

## 2021-09-26 VITALS — SYSTOLIC BLOOD PRESSURE: 93 MMHG | DIASTOLIC BLOOD PRESSURE: 56 MMHG

## 2021-09-26 LAB
ANION GAP SERPL CALC-SCNC: 7 MMOL/L (ref 6–14)
BASOPHILS # BLD AUTO: 0.1 X10^3/UL (ref 0–0.2)
BASOPHILS NFR BLD: 1 % (ref 0–3)
CA-I SERPL ISE-MCNC: 20 MG/DL (ref 7–20)
CALCIUM SERPL-MCNC: 8.6 MG/DL (ref 8.5–10.1)
CHLORIDE SERPL-SCNC: 99 MMOL/L (ref 98–107)
CO2 SERPL-SCNC: 28 MMOL/L (ref 21–32)
CREAT SERPL-MCNC: 1.3 MG/DL (ref 0.6–1)
EOSINOPHIL NFR BLD: 0.3 X10^3/UL (ref 0–0.7)
EOSINOPHIL NFR BLD: 3 % (ref 0–3)
ERYTHROCYTE [DISTWIDTH] IN BLOOD BY AUTOMATED COUNT: 17.1 % (ref 11.5–14.5)
GFR SERPLBLD BASED ON 1.73 SQ M-ARVRAT: 38.8 ML/MIN
GLUCOSE SERPL-MCNC: 152 MG/DL (ref 70–99)
HCT VFR BLD CALC: 29.6 % (ref 36–47)
HGB BLD-MCNC: 9.6 G/DL (ref 12–15.5)
LYMPHOCYTES # BLD: 1.5 X10^3/UL (ref 1–4.8)
LYMPHOCYTES NFR BLD AUTO: 15 % (ref 24–48)
MCH RBC QN AUTO: 28 PG (ref 25–35)
MCHC RBC AUTO-ENTMCNC: 32 G/DL (ref 31–37)
MCV RBC AUTO: 87 FL (ref 79–100)
MONO #: 1.4 X10^3/UL (ref 0–1.1)
MONOCYTES NFR BLD: 15 % (ref 0–9)
NEUT #: 6.4 X10^3UL (ref 1.8–7.7)
NEUTROPHILS NFR BLD AUTO: 66 % (ref 31–73)
PLATELET # BLD AUTO: 361 X10^3/UL (ref 140–400)
POTASSIUM SERPL-SCNC: 4.2 MMOL/L (ref 3.5–5.1)
RBC # BLD AUTO: 3.4 X10^6/UL (ref 3.5–5.4)
SODIUM SERPL-SCNC: 134 MMOL/L (ref 136–145)
WBC # BLD AUTO: 9.8 X10^3/UL (ref 4–11)

## 2021-09-26 RX ADMIN — APIXABAN SCH MG: 5 TABLET, FILM COATED ORAL at 09:24

## 2021-09-26 RX ADMIN — METOPROLOL TARTRATE SCH MG: 25 TABLET, FILM COATED ORAL at 19:18

## 2021-09-26 RX ADMIN — Medication SCH CAP: at 19:17

## 2021-09-26 RX ADMIN — LIDOCAINE SCH PATCH: 50 PATCH CUTANEOUS at 09:25

## 2021-09-26 RX ADMIN — IPRATROPIUM BROMIDE AND ALBUTEROL SULFATE SCH ML: .5; 3 SOLUTION RESPIRATORY (INHALATION) at 20:12

## 2021-09-26 RX ADMIN — ACETAMINOPHEN PRN MG: 325 TABLET, FILM COATED ORAL at 10:59

## 2021-09-26 RX ADMIN — METOPROLOL TARTRATE SCH MG: 25 TABLET, FILM COATED ORAL at 09:00

## 2021-09-26 RX ADMIN — ATORVASTATIN CALCIUM SCH MG: 10 TABLET, FILM COATED ORAL at 19:17

## 2021-09-26 RX ADMIN — APIXABAN SCH MG: 5 TABLET, FILM COATED ORAL at 19:17

## 2021-09-26 RX ADMIN — MONTELUKAST SODIUM SCH MG: 10 TABLET ORAL at 19:17

## 2021-09-26 RX ADMIN — INSULIN GLARGINE SCH UNIT: 100 INJECTION, SOLUTION SUBCUTANEOUS at 20:35

## 2021-09-26 RX ADMIN — DEXTROSE SCH MLS/HR: 10 SOLUTION INTRAVENOUS at 13:45

## 2021-09-26 RX ADMIN — LATANOPROST SCH DROP: 50 SOLUTION OPHTHALMIC at 18:00

## 2021-09-26 RX ADMIN — LATANOPROST SCH DROP: 50 SOLUTION OPHTHALMIC at 18:16

## 2021-09-26 RX ADMIN — IPRATROPIUM BROMIDE AND ALBUTEROL SULFATE SCH ML: .5; 3 SOLUTION RESPIRATORY (INHALATION) at 10:26

## 2021-09-26 RX ADMIN — Medication SCH EA: at 20:45

## 2021-09-26 RX ADMIN — DORZOLAMIDE HYDROCHLORIDE AND TIMOLOL MALEATE SCH DROP: 20; 5 SOLUTION/ DROPS OPHTHALMIC at 07:46

## 2021-09-26 RX ADMIN — ACETAMINOPHEN PRN MG: 325 TABLET, FILM COATED ORAL at 19:17

## 2021-09-26 RX ADMIN — IPRATROPIUM BROMIDE AND ALBUTEROL SULFATE SCH ML: .5; 3 SOLUTION RESPIRATORY (INHALATION) at 05:01

## 2021-09-26 RX ADMIN — LATANOPROST SCH DROP: 50 SOLUTION OPHTHALMIC at 09:51

## 2021-09-26 RX ADMIN — IPRATROPIUM BROMIDE AND ALBUTEROL SULFATE SCH ML: .5; 3 SOLUTION RESPIRATORY (INHALATION) at 16:21

## 2021-09-26 RX ADMIN — TRAZODONE HYDROCHLORIDE SCH MG: 50 TABLET, FILM COATED ORAL at 19:17

## 2021-09-26 RX ADMIN — Medication SCH CAP: at 10:57

## 2021-09-26 NOTE — PN
DATE: 09/26/2021

SUBJECTIVE:  The patient is resting, slightly propped up in bed, continued to 

have recurrent bouts of cough with thick whitish sputum.  She is very wheezy, 

continued to have very poor appetite and her blood sugar was consistently low.  

Yesterday, she was started on D10 at 30 mL per hour and her blood sugar has 

improved on this afternoon. She has not really eaten much according to the 

nursing staff. Her lab work is mostly unremarkable; however, her CT scan of the 

chest without contrast showed that she is actually finding worse.  There is 

significant motion artifact; however, the right apical consolidative opacities 

are unchanged.  She has small scattered nodular opacities elsewhere are likely 

unchanged allowing for difference in slice thickness of the CT scan and motion 

artifact.  There are increased small bilateral pleural effusion and adjacent 

consolidation or atelectasis in the lower lobes, unchanged mild atelectasis in 

the lingula and right middle lobe interlobular septal thickening diaphysis is 

less conspicuous, although evaluation is limited by motion artifact.  She has 

coronary artery and aortic calcification.  However, the thyroid gland and 

thoracic inlet is unremarkable.  Heart and great vessels showed the heart is 

normal size.  There are extensive coronary artery calcification.  No pericardial

effusion.  However, the thoracic aorta is normal in caliber with severe 

calcified atherosclerosis.  She does have small mediastinal hilar lymph nodes, 

nonspecific and likely reactive.



PHYSICAL EXAMINATION:

GENERAL:  When I examined her this afternoon, she was pale, but not jaundice or 

cyanosed, no lymphadenopathy, no thyromegaly, no jugular venous distention.  No 

lower limb edema.

VITAL SIGNS:  Her heart rate was 86, blood pressure is 102/63, temperature she 

did spike a temperature this morning up to 100.4, respiratory rate 20, and 

oxygen saturation was 94% on 3 liters of oxygen.

HEAD, EYES, EARS, NOSE AND THROAT:  Normocephalic, atraumatic.

NECK:  Supple.

HEART:  Showed normal first and second heart sounds, no gallop or murmur.

CHEST:  Clear to auscultation, no crepitation or rhonchi.

ABDOMEN:  Distended, soft, nontender.

NEUROLOGIC:  She is demented without any obvious lateralizing sign.  She has 

multiple wounds in the coccyx and bilateral heel decubitus ulcer.



Her intake over the last 24 hours was 750, output was 750.



LABORATORY DATA:  As of this morning, her white cell count was 9800; hemoglobin 

9.6; hematocrit 29.6; MCV 87 and platelet count 361,000.  Her serum sodium was 

134, potassium 4.2, chloride 99, bicarbonate 28, anion gap of 7, BUN 20, 

creatinine 1.3.  Estimated GFR was 39 mL per minute.  Her glucose was 152, 

calcium was 8.6.



ASSESSMENT:

1.  Altered mental status, likely due to hypoglycemia that has improved.  Her 

blood sugars has normalized now.

2.  Her chest x-ray showed that she had worsening infiltrate, left greater than 

the right and the CT scan confirmed that finding.

3.  Acute hypoxic respiratory failure likely due to on chronic diastolic 

congestive heart failure, possible superimposed pneumonia.

4.  Acute on chronic diastolic congestive heart failure.  Her most recent 

echocardiogram showed preserved left ventricular systolic function.  Her BMP 

down from 20,000-17,000.  Unfortunately, the patient is borderline hypotensive.

5.  Tachyarrhythmias likely multifocal atrial tachycardia versus atrial 

fibrillation.  She is currently on Cardizem and low dose metoprolol.

6.  Hyperlipidemia.

7.  Type 2 diabetes mellitus.

8.  Chronic kidney disease.

9.  Dementia.

10.  History of deep vein thrombosis for which she is on Eliquis.

11. Coccygeal decubitus ulcer as well as bilateral heel decubitus ulcer.

12.  The patient is borderline hypotensive.



PLAN:  I have discontinued her losartan yesterday.  I cut down the metoprolol 

12.5 mg twice a day.  I will cut down her diltiazem to 120 mg and repeat her lab

work tomorrow.  I will also consult speech therapist tomorrow to evaluate to do 

a video swallowing evaluation.  Her overall prognosis is poor.  She seemed to be

aspirating and my recommendation is obviously hospice that something unlikely 

that the family would accept but I will discuss this with our  

tomorrow.







AMM/TIKA

DR: AMM/nts   DD: 09/26/2021 13:27

DT: 09/26/2021 20:14   TID: [id number]

## 2021-09-26 NOTE — PDOC
DATE OF SERVICE:


DOS:


DATE: 9/26/21 


TIME: 11:37





SUBJECTIVE:


Patient seen and examined





OBJECTIVE:


Problems:


Problems


Medical Problems:


(1) Dementia


Status: Acute  





(2) Pneumonia


Status: Acute  





Vital Signs/I&O:





                                   Vital Signs








  Date Time  Temp Pulse Resp B/P (MAP) Pulse Ox O2 Delivery O2 Flow Rate FiO2


 


9/26/21 11:11 100.4 86 20 102/63 (76) 94 Nasal Cannula 3.0 














                                    I & O   


 


 9/25/21 9/25/21 9/26/21





 15:00 23:00 07:00


 


Intake Total  50 ml 20 ml


 


Output Total  500 ml 300 ml


 


Balance  -450 ml -280 ml








Labs:





                                Laboratory Tests








Test


 9/25/21


12:05 9/25/21


13:36 9/25/21


14:18 9/25/21


16:48


 


Glucose (Fingerstick)


 56 mg/dL


(70-99)  L 49 mg/dL


(70-99)  L 92 mg/dL


(70-99) 111 mg/dL


(70-99)  H


 


Test


 9/25/21


20:02 9/26/21


06:30 9/26/21


07:32 





 


Glucose (Fingerstick)


 89 mg/dL


(70-99) 


 149 mg/dL


(70-99)  H 





 


White Blood Count


 


 9.8 x10^3/uL


(4.0-11.0) 


 





 


Red Blood Count


 


 3.40 x10^6/uL


(3.50-5.40)  L 


 





 


Hemoglobin


 


 9.6 g/dL


(12.0-15.5)  L 


 





 


Hematocrit


 


 29.6 %


(36.0-47.0)  L 


 





 


Mean Corpuscular Volume


 


 87 fL ()


 


 





 


Mean Corpuscular Hemoglobin  28 pg (25-35)    


 


Mean Corpuscular Hemoglobin


Concent 


 32 g/dL


(31-37) 


 





 


Red Cell Distribution Width


 


 17.1 %


(11.5-14.5)  H 


 





 


Platelet Count


 


 361 x10^3/uL


(140-400) 


 





 


Neutrophils (%) (Auto)  66 % (31-73)    


 


Lymphocytes (%) (Auto)  15 % (24-48)  L  


 


Monocytes (%) (Auto)  15 % (0-9)  H  


 


Eosinophils (%) (Auto)  3 % (0-3)    


 


Basophils (%) (Auto)  1 % (0-3)    


 


Neutrophils # (Auto)


 


 6.4 x10^3uL


(1.8-7.7) 


 





 


Lymphocytes # (Auto)


 


 1.5 x10^3/uL


(1.0-4.8) 


 





 


Monocytes # (Auto)


 


 1.4 x10^3/uL


(0.0-1.1)  H 


 





 


Eosinophils # (Auto)


 


 0.3 x10^3/uL


(0.0-0.7) 


 





 


Basophils # (Auto)


 


 0.1 x10^3/uL


(0.0-0.2) 


 





 


Sodium Level


 


 134 mmol/L


(136-145)  L 


 





 


Potassium Level


 


 4.2 mmol/L


(3.5-5.1) 


 





 


Chloride Level


 


 99 mmol/L


() 


 





 


Carbon Dioxide Level


 


 28 mmol/L


(21-32) 


 





 


Anion Gap  7 (6-14)    


 


Blood Urea Nitrogen


 


 20 mg/dL


(7-20) 


 





 


Creatinine


 


 1.3 mg/dL


(0.6-1.0)  H 


 





 


Estimated GFR


(Cockcroft-Gault) 


 38.8  


 


 





 


Glucose Level


 


 152 mg/dL


(70-99)  H 


 





 


Calcium Level


 


 8.6 mg/dL


(8.5-10.1) 


 











Physical Exam:


Chest.  Mildly decreased breath sounds.





CV.  Regular rhythm.





Abdomen.  Soft.





ASSESSMENT:


Acute respiratory failure with CHF and possible PNA.  The patient is more short 

of breath today.  We will give 40 mg of IV Lasix and pulmonary treatments.  

Continue to closely monitor.  Chest x-ray in the morning.  


Tachyarrhythmia; EKG shows MAT. Tele with MAT versus AFIB, rate intermittently 

elevated. On Cardizem for rate control. [facility] 

Recent event monitor with [facility].


Acute on chronic diastolic CHF; Recent echo with preserved LV systolic function.

 Increasing short of breath today as noted above.  Treatment with Lasix.


Hypertension; low-dose beta-blocker.


Hyperlipidmia; statin 


Diabetes, II


CKD; Cr stable at 1.3.  Will recheck in the morning.


Dementia 


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





Justification of Admission:


Justification of Admission:


Justification of Admission Dx:  Yes











BEKAH ROBERT MD            Sep 26, 2021 11:39

## 2021-09-27 VITALS — DIASTOLIC BLOOD PRESSURE: 48 MMHG | SYSTOLIC BLOOD PRESSURE: 102 MMHG

## 2021-09-27 VITALS — DIASTOLIC BLOOD PRESSURE: 61 MMHG | SYSTOLIC BLOOD PRESSURE: 106 MMHG

## 2021-09-27 VITALS — SYSTOLIC BLOOD PRESSURE: 120 MMHG | DIASTOLIC BLOOD PRESSURE: 67 MMHG

## 2021-09-27 VITALS — SYSTOLIC BLOOD PRESSURE: 105 MMHG | DIASTOLIC BLOOD PRESSURE: 57 MMHG

## 2021-09-27 VITALS — DIASTOLIC BLOOD PRESSURE: 62 MMHG | SYSTOLIC BLOOD PRESSURE: 109 MMHG

## 2021-09-27 LAB
ANION GAP SERPL CALC-SCNC: 7 MMOL/L (ref 6–14)
BASOPHILS # BLD AUTO: 0 X10^3/UL (ref 0–0.2)
BASOPHILS NFR BLD: 1 % (ref 0–3)
CA-I SERPL ISE-MCNC: 22 MG/DL (ref 7–20)
CALCIUM SERPL-MCNC: 9.1 MG/DL (ref 8.5–10.1)
CHLORIDE SERPL-SCNC: 98 MMOL/L (ref 98–107)
CO2 SERPL-SCNC: 30 MMOL/L (ref 21–32)
CREAT SERPL-MCNC: 1.3 MG/DL (ref 0.6–1)
EOSINOPHIL NFR BLD: 0.6 X10^3/UL (ref 0–0.7)
EOSINOPHIL NFR BLD: 6 % (ref 0–3)
ERYTHROCYTE [DISTWIDTH] IN BLOOD BY AUTOMATED COUNT: 16.8 % (ref 11.5–14.5)
GFR SERPLBLD BASED ON 1.73 SQ M-ARVRAT: 38.8 ML/MIN
GLUCOSE SERPL-MCNC: 169 MG/DL (ref 70–99)
HCT VFR BLD CALC: 29.8 % (ref 36–47)
HGB BLD-MCNC: 9.7 G/DL (ref 12–15.5)
LYMPHOCYTES # BLD: 1.6 X10^3/UL (ref 1–4.8)
LYMPHOCYTES NFR BLD AUTO: 16 % (ref 24–48)
MAGNESIUM SERPL-MCNC: 1.9 MG/DL (ref 1.8–2.4)
MCH RBC QN AUTO: 28 PG (ref 25–35)
MCHC RBC AUTO-ENTMCNC: 33 G/DL (ref 31–37)
MCV RBC AUTO: 87 FL (ref 79–100)
MONO #: 1.1 X10^3/UL (ref 0–1.1)
MONOCYTES NFR BLD: 10 % (ref 0–9)
NEUT #: 6.9 X10^3UL (ref 1.8–7.7)
NEUTROPHILS NFR BLD AUTO: 68 % (ref 31–73)
PLATELET # BLD AUTO: 353 X10^3/UL (ref 140–400)
POTASSIUM SERPL-SCNC: 3.7 MMOL/L (ref 3.5–5.1)
RBC # BLD AUTO: 3.44 X10^6/UL (ref 3.5–5.4)
SODIUM SERPL-SCNC: 135 MMOL/L (ref 136–145)
WBC # BLD AUTO: 10.2 X10^3/UL (ref 4–11)

## 2021-09-27 RX ADMIN — LATANOPROST SCH DROP: 50 SOLUTION OPHTHALMIC at 07:55

## 2021-09-27 RX ADMIN — Medication SCH EA: at 19:53

## 2021-09-27 RX ADMIN — Medication SCH CAP: at 19:53

## 2021-09-27 RX ADMIN — DEXTROSE SCH MLS/HR: 10 SOLUTION INTRAVENOUS at 05:35

## 2021-09-27 RX ADMIN — APIXABAN SCH MG: 5 TABLET, FILM COATED ORAL at 07:54

## 2021-09-27 RX ADMIN — ALBUTEROL SULFATE PRN MG: 108 AEROSOL, METERED RESPIRATORY (INHALATION) at 08:43

## 2021-09-27 RX ADMIN — APIXABAN SCH MG: 5 TABLET, FILM COATED ORAL at 19:56

## 2021-09-27 RX ADMIN — LIDOCAINE SCH PATCH: 50 PATCH CUTANEOUS at 07:55

## 2021-09-27 RX ADMIN — LATANOPROST SCH DROP: 50 SOLUTION OPHTHALMIC at 16:42

## 2021-09-27 RX ADMIN — Medication SCH CAP: at 07:54

## 2021-09-27 RX ADMIN — METOPROLOL TARTRATE SCH MG: 25 TABLET, FILM COATED ORAL at 07:53

## 2021-09-27 RX ADMIN — METOPROLOL TARTRATE SCH MG: 25 TABLET, FILM COATED ORAL at 19:55

## 2021-09-27 RX ADMIN — IPRATROPIUM BROMIDE AND ALBUTEROL SULFATE SCH ML: .5; 3 SOLUTION RESPIRATORY (INHALATION) at 16:24

## 2021-09-27 RX ADMIN — IPRATROPIUM BROMIDE AND ALBUTEROL SULFATE SCH ML: .5; 3 SOLUTION RESPIRATORY (INHALATION) at 21:47

## 2021-09-27 RX ADMIN — MONTELUKAST SODIUM SCH MG: 10 TABLET ORAL at 19:53

## 2021-09-27 RX ADMIN — IPRATROPIUM BROMIDE AND ALBUTEROL SULFATE SCH ML: .5; 3 SOLUTION RESPIRATORY (INHALATION) at 05:30

## 2021-09-27 RX ADMIN — TRAZODONE HYDROCHLORIDE SCH MG: 50 TABLET, FILM COATED ORAL at 19:54

## 2021-09-27 RX ADMIN — ATORVASTATIN CALCIUM SCH MG: 10 TABLET, FILM COATED ORAL at 19:53

## 2021-09-27 RX ADMIN — INSULIN GLARGINE SCH UNIT: 100 INJECTION, SOLUTION SUBCUTANEOUS at 21:30

## 2021-09-27 RX ADMIN — IPRATROPIUM BROMIDE AND ALBUTEROL SULFATE SCH ML: .5; 3 SOLUTION RESPIRATORY (INHALATION) at 10:59

## 2021-09-27 RX ADMIN — DORZOLAMIDE HYDROCHLORIDE AND TIMOLOL MALEATE SCH DROP: 20; 5 SOLUTION/ DROPS OPHTHALMIC at 07:55

## 2021-09-27 NOTE — PDOC
CARDIO Progress Notes


Date & Time


Date of Service


DATE: 9/27/21 


TIME: 08:30


Time of Evaluation


08:30





Subjective


Notes


reports cough and shortness of breath





Vitals


Vitals





Vital Signs








  Date Time  Temp Pulse Resp B/P (MAP) Pulse Ox O2 Delivery O2 Flow Rate FiO2


 


9/27/21 07:55  118  120/67    


 


9/27/21 06:19 97.0  18  100 Nasal Cannula 3.0 








Weight


Weight [ ]





Input and Output


I.O.











Intake and Output 


 


 9/27/21





 07:00


 


Intake Total 660 ml


 


Output Total 1370 ml


 


Balance -710 ml


 


 


 


Intake Oral 660 ml


 


Output Urine Total 1370 ml











Laboratory


Labs





Laboratory Tests








Test


 9/25/21


12:05 9/25/21


13:36 9/25/21


14:18 9/25/21


16:48


 


Glucose (Fingerstick)


 56 mg/dL


(70-99) 49 mg/dL


(70-99) 92 mg/dL


(70-99) 111 mg/dL


(70-99)


 


Test


 9/25/21


20:02 9/26/21


06:30 9/26/21


07:32 9/26/21


11:28


 


Glucose (Fingerstick)


 89 mg/dL


(70-99) 


 149 mg/dL


(70-99) 289 mg/dL


(70-99)


 


White Blood Count


 


 9.8 x10^3/uL


(4.0-11.0) 


 





 


Red Blood Count


 


 3.40 x10^6/uL


(3.50-5.40) 


 





 


Hemoglobin


 


 9.6 g/dL


(12.0-15.5) 


 





 


Hematocrit


 


 29.6 %


(36.0-47.0) 


 





 


Mean Corpuscular Volume  87 fL ()   


 


Mean Corpuscular Hemoglobin  28 pg (25-35)   


 


Mean Corpuscular Hemoglobin


Concent 


 32 g/dL


(31-37) 


 





 


Red Cell Distribution Width


 


 17.1 %


(11.5-14.5) 


 





 


Platelet Count


 


 361 x10^3/uL


(140-400) 


 





 


Neutrophils (%) (Auto)  66 % (31-73)   


 


Lymphocytes (%) (Auto)  15 % (24-48)   


 


Monocytes (%) (Auto)  15 % (0-9)   


 


Eosinophils (%) (Auto)  3 % (0-3)   


 


Basophils (%) (Auto)  1 % (0-3)   


 


Neutrophils # (Auto)


 


 6.4 x10^3uL


(1.8-7.7) 


 





 


Lymphocytes # (Auto)


 


 1.5 x10^3/uL


(1.0-4.8) 


 





 


Monocytes # (Auto)


 


 1.4 x10^3/uL


(0.0-1.1) 


 





 


Eosinophils # (Auto)


 


 0.3 x10^3/uL


(0.0-0.7) 


 





 


Basophils # (Auto)


 


 0.1 x10^3/uL


(0.0-0.2) 


 





 


Sodium Level


 


 134 mmol/L


(136-145) 


 





 


Potassium Level


 


 4.2 mmol/L


(3.5-5.1) 


 





 


Chloride Level


 


 99 mmol/L


() 


 





 


Carbon Dioxide Level


 


 28 mmol/L


(21-32) 


 





 


Anion Gap  7 (6-14)   


 


Blood Urea Nitrogen


 


 20 mg/dL


(7-20) 


 





 


Creatinine


 


 1.3 mg/dL


(0.6-1.0) 


 





 


Estimated GFR


(Cockcroft-Gault) 


 38.8 


 


 





 


Glucose Level


 


 152 mg/dL


(70-99) 


 





 


Calcium Level


 


 8.6 mg/dL


(8.5-10.1) 


 





 


Test


 9/26/21


16:25 9/26/21


20:31 9/27/21


06:40 9/27/21


07:43


 


Glucose (Fingerstick)


 246 mg/dL


(70-99) 258 mg/dL


(70-99) 


 163 mg/dL


(70-99)


 


White Blood Count


 


 


 10.2 x10^3/uL


(4.0-11.0) 





 


Red Blood Count


 


 


 3.44 x10^6/uL


(3.50-5.40) 





 


Hemoglobin


 


 


 9.7 g/dL


(12.0-15.5) 





 


Hematocrit


 


 


 29.8 %


(36.0-47.0) 





 


Mean Corpuscular Volume   87 fL ()  


 


Mean Corpuscular Hemoglobin   28 pg (25-35)  


 


Mean Corpuscular Hemoglobin


Concent 


 


 33 g/dL


(31-37) 





 


Red Cell Distribution Width


 


 


 16.8 %


(11.5-14.5) 





 


Platelet Count


 


 


 353 x10^3/uL


(140-400) 





 


Neutrophils (%) (Auto)   68 % (31-73)  


 


Lymphocytes (%) (Auto)   16 % (24-48)  


 


Monocytes (%) (Auto)   10 % (0-9)  


 


Eosinophils (%) (Auto)   6 % (0-3)  


 


Basophils (%) (Auto)   1 % (0-3)  


 


Neutrophils # (Auto)


 


 


 6.9 x10^3uL


(1.8-7.7) 





 


Lymphocytes # (Auto)


 


 


 1.6 x10^3/uL


(1.0-4.8) 





 


Monocytes # (Auto)


 


 


 1.1 x10^3/uL


(0.0-1.1) 





 


Eosinophils # (Auto)


 


 


 0.6 x10^3/uL


(0.0-0.7) 





 


Basophils # (Auto)


 


 


 0.0 x10^3/uL


(0.0-0.2) 





 


Sodium Level


 


 


 135 mmol/L


(136-145) 





 


Potassium Level


 


 


 3.7 mmol/L


(3.5-5.1) 





 


Chloride Level


 


 


 98 mmol/L


() 





 


Carbon Dioxide Level


 


 


 30 mmol/L


(21-32) 





 


Anion Gap   7 (6-14)  


 


Blood Urea Nitrogen


 


 


 22 mg/dL


(7-20) 





 


Creatinine


 


 


 1.3 mg/dL


(0.6-1.0) 





 


Estimated GFR


(Cockcroft-Gault) 


 


 38.8 


 





 


Glucose Level


 


 


 169 mg/dL


(70-99) 





 


Calcium Level


 


 


 9.1 mg/dL


(8.5-10.1) 





 


Magnesium Level


 


 


 1.9 mg/dL


(1.8-2.4) 














Physical Exams


HEENT:  Neck Supple W Full Motion


Chest:  Symmetric


Lungs:  Other


Heart:  RRR


Abdomen:  Soft N/T


Extremities:  No Edema


Neurology:  alert, follow commands





Assessment


Assessment


1.  Acute respiratory failure with CHF and probable PNA. ? aspiration.  


2.  Tachyarrhythmia; EKG shows MAT. Tele with periods of MAT versus AFIB. 

presently SR in 80's.  On Cardizem for rate control. [facility] Recent event monitor with SR, PAT.


3.  Acute on chronic diastolic CHF; Recent echo with preserved LV systolic 

function.  Increasing short of breath today as noted above.  Treatment with 

Lasix.


4.  Hypertension; low end. Cardizem and metoprolol decreased


5.  Hyperlipidmia; statin 


6.  Diabetes, II


7.  CKD; Cr stable at 1.3. 


8.  Dementia 


9.  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








Recommendations





Mild diuresis


Cardizem, metoprolol for rate control as BP allows


Ongoing antibiotic therapy 


Hospice consideration











NELLY ACUÑA           Sep 27, 2021 08:34

## 2021-09-27 NOTE — NUR
O2 Sat 100% on 3L via nasal canula, respirations even and unlabored; decreased oxygen to 2L, 
recheck at 10 min shows Sat 95% with no apparent distress.

## 2021-09-27 NOTE — NUR
NURSING NOTE 



PT WAS IN BED THIS AM UPON ASSESSMENT AND MEDICATION ADMINISTRATION. PT TOOK SOME MEDS 1 AT 
A TIME BUT WHOLE. PT IS ALERT TO SELF ONLY, STATING ITS 1965. PT IS LETHARGIC AND SHORT OF 
AIR. PRN BREATHING TREATMENT GIVEN THIS AM. PT HAS WET COUGH AND HAVING DIFFICULTY CLEARING 
SECRETIONS. PT IS ON 2 LITERS OF OXYGEN AT THIS TIME WITH HUMIDIFIER ATTACHED. PT HAS BEEN 
SINUS ARRHYTHMIA ON THE MONITOR. CARDIOLOGY CONSULTED AND ROUNDING ON PT DAILY. PT HAD 
CRACKLES THIS AM PRIOR TO BREATHING TREATMENT, AND WHEEZE IN UPPER LOBE. PT DID NOT HAVE AN 
APPETITE FOR BREAKFAST, REFUSED TO EAT. PT GIVEN ENSURE CLEAR AND MAGIC PUDDING CUP FOR 
NUTRITION. PT IS CURRENTLY GETTING D10@ 20MLS/HR FOR GLUCOSE IV FLUIDS. PT IS HERE FROM [place] FROM [place], DIAGNOSED WITH PNEUMONIA, CASE MANAGEMENT INVOLVED AND WORKING WITH FAMILY 
ABOUT DISCHARGE PLANNING. PER REPORT, FAMILY IN DENIAL THAT PT IS DECLINING. SPEECH THERAPY 
CONSULTED PER FAMILY REQUEST. PT THIS AFTERNOON HAS BEEN YELLING OUT AT TIMES "TONE, IS 
THAT YOU?" AND "PLEASE HELP ME". PT HAS PRN PAIN MEDICATION FOR HER HIP AND BACK, LIDOCAINE 
PATCH APPLIED THIS AM TO BACK FOR PAIN. 



MADINA MOSQUEDA.

## 2021-09-27 NOTE — RAD
XR CHEST 1V 



INDICATION: SOA, CHF 



COMPARISON STUDY: CT 9/25/2021. Chest radiograph 9/24/2021



FINDINGS:



Lungs: Normal lung volume. Improving bilateral perihilar opacities.



Pleura: Stable small pleural effusions.



Heart and Mediastinum: Stable cardiomediastinal silhouette and great vessels. 



IMPRESSION:  

1. Improving bilateral opacities.

2. Stable small pleural effusions.



Electronically signed by: Joey Bansal MD (9/27/2021 11:21 AM) [id number]

## 2021-09-27 NOTE — NUR
NURSING NOTE



PER DR ANDERSON, HOLD LASIX AT THIS TIME D/T BORDERLINE BLOOD PRESSURES.



MADINA MOSQUEDA.

## 2021-09-28 VITALS — DIASTOLIC BLOOD PRESSURE: 56 MMHG | SYSTOLIC BLOOD PRESSURE: 102 MMHG

## 2021-09-28 VITALS — DIASTOLIC BLOOD PRESSURE: 65 MMHG | SYSTOLIC BLOOD PRESSURE: 102 MMHG

## 2021-09-28 VITALS — DIASTOLIC BLOOD PRESSURE: 61 MMHG | SYSTOLIC BLOOD PRESSURE: 121 MMHG

## 2021-09-28 VITALS — DIASTOLIC BLOOD PRESSURE: 58 MMHG | SYSTOLIC BLOOD PRESSURE: 106 MMHG

## 2021-09-28 LAB
ANION GAP SERPL CALC-SCNC: 6 MMOL/L (ref 6–14)
CA-I SERPL ISE-MCNC: 21 MG/DL (ref 7–20)
CALCIUM SERPL-MCNC: 8.6 MG/DL (ref 8.5–10.1)
CHLORIDE SERPL-SCNC: 100 MMOL/L (ref 98–107)
CO2 SERPL-SCNC: 31 MMOL/L (ref 21–32)
CREAT SERPL-MCNC: 1.2 MG/DL (ref 0.6–1)
GFR SERPLBLD BASED ON 1.73 SQ M-ARVRAT: 42.6 ML/MIN
GLUCOSE SERPL-MCNC: 57 MG/DL (ref 70–99)
POTASSIUM SERPL-SCNC: 3.2 MMOL/L (ref 3.5–5.1)
SODIUM SERPL-SCNC: 137 MMOL/L (ref 136–145)

## 2021-09-28 RX ADMIN — ATORVASTATIN CALCIUM SCH MG: 10 TABLET, FILM COATED ORAL at 20:47

## 2021-09-28 RX ADMIN — IPRATROPIUM BROMIDE AND ALBUTEROL SULFATE SCH ML: .5; 3 SOLUTION RESPIRATORY (INHALATION) at 10:33

## 2021-09-28 RX ADMIN — LIDOCAINE SCH PATCH: 50 PATCH CUTANEOUS at 08:13

## 2021-09-28 RX ADMIN — Medication SCH EA: at 20:49

## 2021-09-28 RX ADMIN — MONTELUKAST SODIUM SCH MG: 10 TABLET ORAL at 20:47

## 2021-09-28 RX ADMIN — APIXABAN SCH MG: 5 TABLET, FILM COATED ORAL at 08:09

## 2021-09-28 RX ADMIN — APIXABAN SCH MG: 5 TABLET, FILM COATED ORAL at 20:47

## 2021-09-28 RX ADMIN — METOPROLOL TARTRATE SCH MG: 25 TABLET, FILM COATED ORAL at 08:12

## 2021-09-28 RX ADMIN — IPRATROPIUM BROMIDE AND ALBUTEROL SULFATE SCH ML: .5; 3 SOLUTION RESPIRATORY (INHALATION) at 15:52

## 2021-09-28 RX ADMIN — DORZOLAMIDE HYDROCHLORIDE AND TIMOLOL MALEATE SCH DROP: 20; 5 SOLUTION/ DROPS OPHTHALMIC at 08:09

## 2021-09-28 RX ADMIN — Medication SCH CAP: at 20:47

## 2021-09-28 RX ADMIN — Medication SCH CAP: at 08:09

## 2021-09-28 RX ADMIN — LATANOPROST SCH DROP: 50 SOLUTION OPHTHALMIC at 08:08

## 2021-09-28 RX ADMIN — IPRATROPIUM BROMIDE AND ALBUTEROL SULFATE SCH ML: .5; 3 SOLUTION RESPIRATORY (INHALATION) at 05:34

## 2021-09-28 RX ADMIN — TRAZODONE HYDROCHLORIDE SCH MG: 50 TABLET, FILM COATED ORAL at 20:47

## 2021-09-28 RX ADMIN — IPRATROPIUM BROMIDE AND ALBUTEROL SULFATE SCH ML: .5; 3 SOLUTION RESPIRATORY (INHALATION) at 20:57

## 2021-09-28 RX ADMIN — LATANOPROST SCH DROP: 50 SOLUTION OPHTHALMIC at 17:36

## 2021-09-28 RX ADMIN — IPRATROPIUM BROMIDE AND ALBUTEROL SULFATE SCH ML: .5; 3 SOLUTION RESPIRATORY (INHALATION) at 16:08

## 2021-09-28 RX ADMIN — METOPROLOL TARTRATE SCH MG: 25 TABLET, FILM COATED ORAL at 20:48

## 2021-09-28 NOTE — RAD
Video swallow 9/20/2021



Clinical History: Cough, shortness of breath, crackles on physical examination. Clinical concern for 
aspiration.



Technique: A video swallow study was performed by the radiologic technologist in conjunction with the
 department of speech pathology. The patient was given a series of swallowing trials using varying co
nsistencies of barium under fluoroscopic control. The total fluoroscopic time for this study was 1.8 
minutes. A single fluoroscopically captured lateral radiograph of the neck was obtained.



Findings: The patient demonstrates intermittent silent aspiration with thin liquids. The patient tole
rated the thicker consistencies of barium well without evidence of aspiration.



Impression: Intermittent silent aspiration with thin liquids.



Electronically signed by: Calvin Gray MD (9/28/2021 2:17 PM) [id number]

## 2021-09-28 NOTE — PDOC
CARDIO Progress Notes


Date & Time


Date of Service


DATE: 9/28/21 


TIME: 08:13


Time of Evaluation


08:13





Subjective


Notes


Feels like "s.h.i.t" this morning. Wheezy, mild SOA


No chest pain, palpitations





Vitals


Vitals





Vital Signs








  Date Time  Temp Pulse Resp B/P (MAP) Pulse Ox O2 Delivery O2 Flow Rate FiO2


 


9/28/21 05:59 98.1 64 18 102/65 (77) 96 Nasal Cannula 2.0 








Weight


Weight [ ]





Input and Output


I.O.











Intake and Output 


 


 9/28/21





 07:00


 


Intake Total 680 ml


 


Output Total 1350 ml


 


Balance -670 ml


 


 


 


Intake Oral 680 ml


 


Output Urine Total 1350 ml











Laboratory


Labs





Laboratory Tests








Test


 9/26/21


11:28 9/26/21


16:25 9/26/21


20:31 9/27/21


06:40


 


Glucose (Fingerstick)


 289 mg/dL


(70-99) 246 mg/dL


(70-99) 258 mg/dL


(70-99) 





 


White Blood Count


 


 


 


 10.2 x10^3/uL


(4.0-11.0)


 


Red Blood Count


 


 


 


 3.44 x10^6/uL


(3.50-5.40)


 


Hemoglobin


 


 


 


 9.7 g/dL


(12.0-15.5)


 


Hematocrit


 


 


 


 29.8 %


(36.0-47.0)


 


Mean Corpuscular Volume    87 fL () 


 


Mean Corpuscular Hemoglobin    28 pg (25-35) 


 


Mean Corpuscular Hemoglobin


Concent 


 


 


 33 g/dL


(31-37)


 


Red Cell Distribution Width


 


 


 


 16.8 %


(11.5-14.5)


 


Platelet Count


 


 


 


 353 x10^3/uL


(140-400)


 


Neutrophils (%) (Auto)    68 % (31-73) 


 


Lymphocytes (%) (Auto)    16 % (24-48) 


 


Monocytes (%) (Auto)    10 % (0-9) 


 


Eosinophils (%) (Auto)    6 % (0-3) 


 


Basophils (%) (Auto)    1 % (0-3) 


 


Neutrophils # (Auto)


 


 


 


 6.9 x10^3uL


(1.8-7.7)


 


Lymphocytes # (Auto)


 


 


 


 1.6 x10^3/uL


(1.0-4.8)


 


Monocytes # (Auto)


 


 


 


 1.1 x10^3/uL


(0.0-1.1)


 


Eosinophils # (Auto)


 


 


 


 0.6 x10^3/uL


(0.0-0.7)


 


Basophils # (Auto)


 


 


 


 0.0 x10^3/uL


(0.0-0.2)


 


Sodium Level


 


 


 


 135 mmol/L


(136-145)


 


Potassium Level


 


 


 


 3.7 mmol/L


(3.5-5.1)


 


Chloride Level


 


 


 


 98 mmol/L


()


 


Carbon Dioxide Level


 


 


 


 30 mmol/L


(21-32)


 


Anion Gap    7 (6-14) 


 


Blood Urea Nitrogen


 


 


 


 22 mg/dL


(7-20)


 


Creatinine


 


 


 


 1.3 mg/dL


(0.6-1.0)


 


Estimated GFR


(Cockcroft-Gault) 


 


 


 38.8 





 


Glucose Level


 


 


 


 169 mg/dL


(70-99)


 


Calcium Level


 


 


 


 9.1 mg/dL


(8.5-10.1)


 


Magnesium Level


 


 


 


 1.9 mg/dL


(1.8-2.4)


 


Test


 9/27/21


07:43 9/27/21


21:19 9/28/21


05:58 9/28/21


07:27


 


Glucose (Fingerstick)


 163 mg/dL


(70-99) 236 mg/dL


(70-99) 


 48 mg/dL


(70-99)


 


Sodium Level


 


 


 137 mmol/L


(136-145) 





 


Potassium Level


 


 


 3.2 mmol/L


(3.5-5.1) 





 


Chloride Level


 


 


 100 mmol/L


() 





 


Carbon Dioxide Level


 


 


 31 mmol/L


(21-32) 





 


Anion Gap   6 (6-14)  


 


Blood Urea Nitrogen


 


 


 21 mg/dL


(7-20) 





 


Creatinine


 


 


 1.2 mg/dL


(0.6-1.0) 





 


Estimated GFR


(Cockcroft-Gault) 


 


 42.6 


 





 


Glucose Level


 


 


 57 mg/dL


(70-99) 





 


Calcium Level


 


 


 8.6 mg/dL


(8.5-10.1) 





 


Test


 9/28/21


07:58 


 


 





 


Glucose (Fingerstick)


 53 mg/dL


(70-99) 


 


 














Microbiology


Micro





Microbiology


9/26/21 Blood Culture - Preliminary, Resulted


          NO GROWTH AFTER 1 DAY...





Physical Exams


HEENT:  Neck Supple W Full Motion


Chest:  Symmetric


Lungs:  Other (diffuse expiratory wheezes )


Heart:  RRR (heart tones regular. not on tele)


Abdomen:  Soft N/T


Extremities:  No Edema


Neurology:  alert, follow commands, other (confused )





Assessment


Assessment


1.  Acute respiratory failure with CHF, probable PNA, and AE COPD 


2.  Tachyarrhythmia; EKG shows MAT.  Follows with [facility] Dr. Dee Ferrari. 

Recent event monitor with REINALDO GAONA Off tele, HR controlled per VS review 

overnight 


3.  Acute on chronic diastolic CHF; Recent echo with preserved LV systolic 

function.  s/p IV diuresis 


4.  Hypertension; low end. Cardizem and metoprolol decreased


5.  Hyperlipidmia; statin 


6.  Diabetes, II


7.  CKD; Cr stable at 1.3. 


8.  Dementia 


9.  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








Recommendations





Cardizem, metoprolol for rate control


Ongoing antibiotic therapy 


ST eval pending 


Supportive care


Long-term prognosis poor 


Consider Solumedrol with persistent wheezing. As per NELLY WREN           Sep 28, 2021 08:14

## 2021-09-28 NOTE — NUR
Wound/Ostomy Care



Wound Type/Assessment:  Patient seen per wound care consult. See wound assessment. Patient 
has DFU/cellulitis wound to the right lateral ankle. It appears reddened with scabbed area 
and scant drainage. Patient states it is sensitive to the touch. Wound cleansed, assessed, 
and measured. Wound is minimal and will most likely heal easily. 





Treatment Recommendations/Plan:  Recommendations for xeroform gauze and foam dressing. 
change on Friday and Sunday. Dressing applied and no other wounds noted upon head to toe 
assessment. Coccyx and buttocks is peeling and red with what appears to incontinence 
associated dermatitis but there are no open areas. Calazime to be applied BID and as needed. 






Education provided: Patient is confused at times, but educated on wound care dressing and PU 
prevention. 





Offloading surface/device: Patient encouraged to turn while in bed. Patient in chair during 
time of assessment. 





Recommended Referrals/Tests: N/A 





Discharge Recommendations for dressings: Dressing change instructions left in room. Patient 
assisted back to chair using walker and assistance, call light in reach. Wound care will 
follow up on 10/5/21.

## 2021-09-28 NOTE — PN
DATE: 09/27/2021

SUBJECTIVE:  The patient has continued to be doing poorly.  She has continued to

be weak, mostly bedbound, complaining of low back pain.  Her appetite is very 

poor and seems to be aspirating given her secretion.  We did a repeat CT scan of

her chest and CT scan showed that there are increased small right greater than 

left pleural effusion and adjacent atelectasis or consolidation.  No significant

change in consolidative opacities in the right apex and small scattered nodular 

opacities elsewhere.



PHYSICAL EXAMINATION:

GENERAL:  When I examined her this afternoon, she was pale, but no jaundice, 

cyanosis.  No lymphadenopathy, no thyromegaly, no jugular venous distention.  No

limb edema.

VITAL SIGNS:  Her heart rate was 106, blood pressure was 106/61, temperature was

98, respiratory rate 20, and oxygen saturation was 93% on 2 liters of oxygen.

HEAD, EYES, EARS, NOSE, AND THROAT:  Normocephalic, atraumatic.

NECK:  Supple.

HEART:  Showed normal first and second heart sounds.  No gallop or murmur.

CHEST:  Shows central trachea, equal bilateral chest expansion, air entry, 

expansion with crepitation mostly on both sides posteriorly, very few scattered 

rhonchi.

ABDOMEN:  Distended, soft, nontender.

NEUROLOGIC:  She is demented without any obvious lateralizing sign.



Her intake over the last 24 hours is 70, output was 800.



LABORATORY DATA:  As of this morning showed a white cell count of 10,000, 

hemoglobin 10, hematocrit 30, MCV 87 and platelet count 353,000.  Her chemistry 

showed a serum sodium of 135, potassium 3.7, chloride 98, bicarbonate 30, anion 

gap of 7, BUN 22, creatinine 1.3.  Estimated GFR was 39 mL per minute.  Her 

glucose 169, calcium was 9.1, magnesium was 1.9.  Urinalysis was unremarkable.



ASSESSMENT:

1.  Acute hypoxic respiratory failure, probably multifactorial including:

A.  Acute on chronic diastolic congestive heart failure.

B.  Pneumonia, likely aspiration.

2.  Tachyarrhythmias consistent with multifocal atrial tachycardia versus atrial

fibrillation.  She is on Cardizem and she is currently also on Eliquis for 

stroke prevention and DVT treatment.

3.  Hypertension.  Actually she is borderline hypertensive.

4.  Hyperlipidemia.

5.  Type 2 diabetes mellitus.

6.  Chronic kidney disease.

7.  Dementia.

8.  History of deep vein thrombosis, on Eliquis.



PLAN:  The patient is declining.  She is demented.  She has probably recurrent 

aspiration pneumonia and sacral and bilateral heel decubitus ulcer.  I 

recommended hospice; however, the daughter wants bedside video swallowing 

evaluation.  We consulted the speed and language pathologist and we will decide 

the further management accordingly.  She was seen by the cardiologist today and 

she recommended Lasix 40 mg; however, given her low blood pressure, I decided to

go ahead that we will give her 20 mg of Lasix IV this afternoon.  I will repeat 

her labs again tomorrow.







AMM/MUK

DR: AMM/nts   DD: 09/27/2021 17:31

DT: 09/28/2021 02:48   TID: [id number]

## 2021-09-28 NOTE — DISCH
DISCHARGE ORDERS


DISCHARGE DATE:  Sep 28, 2021


FINAL DIAGNOSIS


acute on chronic diastolic chf


aspiration pneumonia


CONDITION AT DISCHARGE:  Stable


Code Status:  Full


SNF STAY <30 DAYS:  Yes


POST DISCHARGE ORDERS:


ACTIVITY ORDERS:  Activity as tolerated


WEIGHT BEARING STATUS:  As tolerated


DIET AFTER DISCHARGE:  ADA





CHECKS AFTER DISCHARGE:


CHECKS AFTER DISCHARGE:  Check blood press - daily





TREATMENT/EQUIPMENT ORDERS:


ADAPTIVE EQUIPMENT NEEDED:  None





DISCHARGE MEDICATIONS:


Home Meds


Reported Medications


Atorvastatin Calcium (ATORVASTATIN CALCIUM) 10 Mg Tablet, 10 MG PO QHS for FOR 

CHOLESTEROL, #30 TAB 0 Refills


   9/21/21


Lorazepam (ATIVAN) 1 Mg Tablet, 1 TAB PO DAILY for anxiety MDD 3 Tablet(s) for 

30 Days, #30 TAB 0 Refills


   9/21/21


Docusate Sodium (COLACE) 100 Mg Capsule, 1 CAP PO BID for BOWEL MOVEMENTS for 30

Days, #60 CAP 0 Refills


   9/21/21


Hydrocodone Bit/Acetaminophen (HYDROCODONE-APAP 5-325  **) 1 Each Tablet, 1 TAB 

PO PRN Q6HRS PRN for PAIN, TAB 0 Refills


   9/21/21


Insulin Glargine,Hum.rec.anlog (LANTUS SOLOSTAR) 100 Unit/1 Ml Insuln.pen, 30 

UNIT SQ QHS for DM II, #15 ML 3 Refills


   9/21/21


Montelukast Sodium (MONTELUKAST SODIUM TABLET **) 10 Mg Tablet, 10 MG PO HS for 

FOR ASTHMA, TAB 0 Refills


   9/21/21


Apixaban (ELIQUIS) 5 Mg Tablet, 5 MG PO BID for DVT, TAB


   8/8/21


Albuterol Sulfate (ALBUTEROL SULFATE NEB SOLN) 1.25 Mg/3 Ml Vial.neb, 1 VIAL NEB

Q6HRS for ., #150 ML


   7/20/21


Albuterol Sulfate (PROAIR HFA INHALER) 8.5 Gm Hfa.aer.ad, 2 PUFF IH PRN Q4-6HRS 

PRN for wheezing for 21 Days, #1 INHALER 0 Refills


   7/20/21


Dorzolamide Hcl/Timolol Maleat (DORZOLAMIDE-TIMOLOL EYE DROPS) 10 Ml Drops, 1 

DROP OD DAILY07 for ., #10 ML 0 Refills


   7/20/21


Latanoprost (XALATAN) 2.5 Ml Drops, 1 DROP OU BID76 for GLAUCOMA, DROP


   7/20/21


Triamterene/Hydrochlorothiazid (TRIAMTERENE-HCTZ 37.5-25 MG TB) 1 Each Tablet, 1

TAB PO DAILY for ., #30 TAB 5 Refills


   7/20/21


Losartan Potassium (LOSARTAN POTASSIUM **) 25 Mg Tablet, 7.5 MG PO DAILY for 

HYPERTENSION, TAB


   7/20/21


Diltiazem Hcl (CARTIA XT) 180 Mg Cap.er.24h, 180 MG PO DAILY08 for ., CAP.SR


   7/20/21


Discontinued Reported Medications


Pravastatin Sodium (PRAVASTATIN SODIUM) 40 Mg Tablet, 1 TAB PO QHS for ., #90 

TAB 3 Refills


   7/20/21











FARHAN ANDERSON MD                Sep 28, 2021 15:53

## 2021-09-29 VITALS — SYSTOLIC BLOOD PRESSURE: 114 MMHG | DIASTOLIC BLOOD PRESSURE: 63 MMHG

## 2021-09-29 RX ADMIN — LATANOPROST SCH DROP: 50 SOLUTION OPHTHALMIC at 08:35

## 2021-09-29 RX ADMIN — METOPROLOL TARTRATE SCH MG: 25 TABLET, FILM COATED ORAL at 08:36

## 2021-09-29 RX ADMIN — LIDOCAINE SCH PATCH: 50 PATCH CUTANEOUS at 08:42

## 2021-09-29 RX ADMIN — IPRATROPIUM BROMIDE AND ALBUTEROL SULFATE SCH ML: .5; 3 SOLUTION RESPIRATORY (INHALATION) at 06:01

## 2021-09-29 RX ADMIN — Medication SCH CAP: at 08:36

## 2021-09-29 RX ADMIN — IPRATROPIUM BROMIDE AND ALBUTEROL SULFATE SCH ML: .5; 3 SOLUTION RESPIRATORY (INHALATION) at 09:06

## 2021-09-29 RX ADMIN — APIXABAN SCH MG: 5 TABLET, FILM COATED ORAL at 08:36

## 2021-09-29 RX ADMIN — DORZOLAMIDE HYDROCHLORIDE AND TIMOLOL MALEATE SCH DROP: 20; 5 SOLUTION/ DROPS OPHTHALMIC at 08:35

## 2021-09-29 NOTE — NUR
PATIENT IS DISCHARGED TO [facility], REPORT GIVEN TO STAFF NURSE JUNITO. PATIENT LEFT ROOM VIA 
W/C ACCOMP BY CNA. PATIENT IS TAKEN TO [facility] BY TRANSPORTATION PROVIDED BY FACILITY.

## 2021-09-29 NOTE — PN
DATE: 09/28/2021

SUBJECTIVE:  The patient is sitting in the chair with recurrent episodes of 

cough.  She is wheezing.  Apparently, she has had a video swallowing evaluation 

done, which showed that patient has moderate oropharyngeal dysphagia with 

inconsistent trace aspiration of thin liquids.  Aspiration occurred before 

swallow due to pharyngeal delay.  Delay in pharyngeal swallow is likely related 

to patient's shortness of air.  Mastication of soft solid was prolonged, 

inefficient and would foreseeably increase overall risk of aspiration over the 

course of a meal, with modified diet to reduce the mastication requirement and 

provide nectar-thickened liquids via straw.  ____ for diet advancement in near 

future.  However, when respiratory status improves, we would consider repeat 

video swallow to determine safety of advancing diet consistently and she is now 

on dysphagia 2 with nectar thickened liquid.



PHYSICAL EXAMINATION:

GENERAL:  When I examined her, she was pale, but not jaundiced or cyanosed.  No 

lymphadenopathy, no thyromegaly, no jugular venous distention.  No lower limb 

edema.

VITAL SIGNS:  Her heart rate was 83, blood pressure was 102/56, temperature was 

98.6, respiratory rate was 18 and oxygen saturation was 96% on 2 liters of 

oxygen.

HEAD, EYES, EARS, NOSE, AND THROAT:  Normocephalic, atraumatic.

NECK:  Supple.

HEART:  Normal first and second sounds.  No gallop or murmur.

CHEST:  Shows central trachea, equally reduced expansion, air entry, vesicular 

breath sounds with bilateral scattered rhonchi and bilateral basal crepitation 

posteriorly.

ABDOMEN:  Distended, soft, nontender.

NEUROLOGIC:  She is demented, but without any obvious lateralizing sign.  All 

her cranial nerves intact.  She moves extremities without difficulty.  She is 

ambulating short distances with a walker.



Her intake is 660, output was 1370.



LABORATORY DATA:  As of this morning, her white cell count was 10,200, 

hemoglobin 9.7, hematocrit 29.8, MCV 87 and platelet count of 353,000 with 

normal manual differential.  Her chemistry showed her serum sodium was 137, 

potassium 3.2, chloride 100, bicarbonate 31, anion gap of 6, BUN 21, creatinine 

1.2.  Estimated GFR was 42 mL per minute.  Her glucose was ____ and calcium was 

8.6.



ASSESSMENT:

1.  Acute hypoxic respiratory failure, probably multifactorial including:

A. Acute on chronic diastolic congestive heart failure.

B.  Pneumonia, likely aspiration.

2.  Tachyarrhythmias consistent with multifocal atrial tachycardia versus atrial

fibrillation.  She is on Cardizem.  She is currently also on Eliquis for stroke 

prevention and deep vein thrombosis treatment.

3.  Hypertension.  She is actually borderline hypotensive.

4.  Hyperlipidemia.

5.  Type 2 diabetes mellitus.

6.  Chronic kidney disease.

7.  Dementia.

8.  History of deep vein thrombosis, on Eliquis.

9.  Recurrent aspiration pneumonia with the video swallowing evaluation showing 

that she has tendency to aspirate and that she is now on dysphagia 2 diet with 

nectar thickened liquid.



PLAN:  I did recommend hospice; however, the family continued to want aggressive

treatment, and therefore, the patient will be discharged to [facility] if she is 

accepted there today.







AMM/PAULINE/MARLON

DR: AMM/nts   DD: 09/28/2021 14:21

DT: 09/29/2021 00:48   TID: [id number]

## 2021-10-08 ENCOUNTER — HOSPITAL ENCOUNTER (INPATIENT)
Dept: HOSPITAL 61 - 5 SOUTH | Age: 86
LOS: 6 days | Discharge: NURSING, REHAB OR LONG-TERM CARE | DRG: 177 | End: 2021-10-14
Payer: MEDICARE

## 2021-10-08 ENCOUNTER — HOSPITAL ENCOUNTER (EMERGENCY)
Dept: HOSPITAL 63 - ER | Age: 86
Discharge: ANOTHER HOSPITAL | End: 2021-10-08
Payer: MEDICARE

## 2021-10-08 VITALS — BODY MASS INDEX: 26.35 KG/M2 | WEIGHT: 150.58 LBS | HEIGHT: 63.5 IN

## 2021-10-08 VITALS — SYSTOLIC BLOOD PRESSURE: 111 MMHG | DIASTOLIC BLOOD PRESSURE: 79 MMHG

## 2021-10-08 VITALS — SYSTOLIC BLOOD PRESSURE: 108 MMHG | DIASTOLIC BLOOD PRESSURE: 60 MMHG

## 2021-10-08 VITALS — SYSTOLIC BLOOD PRESSURE: 110 MMHG | DIASTOLIC BLOOD PRESSURE: 70 MMHG

## 2021-10-08 VITALS — WEIGHT: 143.3 LBS | HEIGHT: 63.5 IN | BODY MASS INDEX: 25.08 KG/M2

## 2021-10-08 DIAGNOSIS — F03.90: ICD-10-CM

## 2021-10-08 DIAGNOSIS — Z82.49: ICD-10-CM

## 2021-10-08 DIAGNOSIS — R09.02: Primary | ICD-10-CM

## 2021-10-08 DIAGNOSIS — Z88.6: ICD-10-CM

## 2021-10-08 DIAGNOSIS — J98.11: ICD-10-CM

## 2021-10-08 DIAGNOSIS — F32.9: ICD-10-CM

## 2021-10-08 DIAGNOSIS — Z88.0: ICD-10-CM

## 2021-10-08 DIAGNOSIS — I82.401: ICD-10-CM

## 2021-10-08 DIAGNOSIS — R13.10: ICD-10-CM

## 2021-10-08 DIAGNOSIS — Z86.718: ICD-10-CM

## 2021-10-08 DIAGNOSIS — E87.0: ICD-10-CM

## 2021-10-08 DIAGNOSIS — I48.91: ICD-10-CM

## 2021-10-08 DIAGNOSIS — J44.0: ICD-10-CM

## 2021-10-08 DIAGNOSIS — Z79.01: ICD-10-CM

## 2021-10-08 DIAGNOSIS — Z98.42: ICD-10-CM

## 2021-10-08 DIAGNOSIS — I48.0: ICD-10-CM

## 2021-10-08 DIAGNOSIS — Z87.01: ICD-10-CM

## 2021-10-08 DIAGNOSIS — E11.40: ICD-10-CM

## 2021-10-08 DIAGNOSIS — M19.90: ICD-10-CM

## 2021-10-08 DIAGNOSIS — J44.9: ICD-10-CM

## 2021-10-08 DIAGNOSIS — G93.41: ICD-10-CM

## 2021-10-08 DIAGNOSIS — Z98.41: ICD-10-CM

## 2021-10-08 DIAGNOSIS — I50.33: ICD-10-CM

## 2021-10-08 DIAGNOSIS — Z20.822: ICD-10-CM

## 2021-10-08 DIAGNOSIS — M25.571: ICD-10-CM
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
## 2021-10-08 DIAGNOSIS — J69.0: Primary | ICD-10-CM

## 2021-10-08 DIAGNOSIS — L97.319: ICD-10-CM

## 2021-10-08 DIAGNOSIS — E78.00: ICD-10-CM

## 2021-10-08 DIAGNOSIS — E78.5: ICD-10-CM

## 2021-10-08 DIAGNOSIS — Z88.8: ICD-10-CM

## 2021-10-08 DIAGNOSIS — J45.20: ICD-10-CM

## 2021-10-08 DIAGNOSIS — J44.1: ICD-10-CM

## 2021-10-08 DIAGNOSIS — I47.1: ICD-10-CM

## 2021-10-08 DIAGNOSIS — D68.59: ICD-10-CM

## 2021-10-08 DIAGNOSIS — N18.32: ICD-10-CM

## 2021-10-08 DIAGNOSIS — Z90.710: ICD-10-CM

## 2021-10-08 DIAGNOSIS — N17.9: ICD-10-CM

## 2021-10-08 DIAGNOSIS — J96.21: ICD-10-CM

## 2021-10-08 DIAGNOSIS — I13.0: ICD-10-CM

## 2021-10-08 DIAGNOSIS — E11.22: ICD-10-CM

## 2021-10-08 DIAGNOSIS — J90: ICD-10-CM

## 2021-10-08 DIAGNOSIS — F05: ICD-10-CM

## 2021-10-08 DIAGNOSIS — Z82.3: ICD-10-CM

## 2021-10-08 DIAGNOSIS — N18.9: ICD-10-CM

## 2021-10-08 LAB
ACETAMIN: < 2 MCG/ML (ref 10–30)
ALBUMIN SERPL-MCNC: 2.4 G/DL (ref 3.4–5)
ALP SERPL-CCNC: 77 U/L (ref 46–116)
ALT SERPL-CCNC: 16 U/L (ref 14–59)
AMORPH SED URNS QL MICRO: PRESENT /HPF
AMPHETAMINE/METHAMPHETAMINE: (no result)
ANION GAP SERPL CALC-SCNC: 5 MMOL/L (ref 6–14)
APTT PPP: YELLOW S
AST SERPL-CCNC: 19 U/L (ref 15–37)
BACTERIA #/AREA URNS HPF: 0 /HPF
BARBITURATES UR-MCNC: (no result) UG/ML
BASOPHILS # BLD AUTO: 0.1 X10^3/UL (ref 0–0.2)
BASOPHILS NFR BLD: 1 % (ref 0–3)
BENZODIAZ UR-MCNC: (no result) UG/L
BGAS PCO2: 52 MMHG (ref 35–45)
BGAS PH: 7.37 (ref 7.35–7.45)
BGAS PO2: 85 MMHG (ref 71–100)
BILIRUB DIRECT SERPL-MCNC: 0.1 MG/DL (ref 0–0.2)
BILIRUB SERPL-MCNC: 0.2 MG/DL (ref 0.2–1)
BILIRUB UR QL STRIP: (no result)
CA-I SERPL ISE-MCNC: 25 MG/DL (ref 7–20)
CALCIUM SERPL-MCNC: 8.7 MG/DL (ref 8.5–10.1)
CANNABINOIDS UR-MCNC: (no result) UG/L
CHLORIDE SERPL-SCNC: 104 MMOL/L (ref 98–107)
CO2 SERPL-SCNC: 30 MMOL/L (ref 21–32)
COCAINE UR-MCNC: (no result) NG/ML
CREAT SERPL-MCNC: 1.1 MG/DL (ref 0.6–1)
DELTA BASE BGAS: 5 MMOL/L (ref 0–3)
EOSINOPHIL NFR BLD: 0.5 X10^3/UL (ref 0–0.7)
EOSINOPHIL NFR BLD: 5 % (ref 0–3)
ERYTHROCYTE [DISTWIDTH] IN BLOOD BY AUTOMATED COUNT: 17.6 % (ref 11.5–14.5)
FIBRINOGEN PPP-MCNC: CLEAR MG/DL
GFR SERPLBLD BASED ON 1.73 SQ M-ARVRAT: 47.1 ML/MIN
GLUCOSE SERPL-MCNC: 109 MG/DL (ref 70–99)
GLUCOSE UR STRIP-MCNC: (no result) MG/DL
HCT VFR BLD CALC: 27.7 % (ref 36–47)
HGB BLD-MCNC: 8.8 G/DL (ref 12–15.5)
HYALINE CASTS #/AREA URNS LPF: (no result) /HPF
LIPASE: 93 U/L (ref 73–393)
LYMPHOCYTES # BLD: 2.2 X10^3/UL (ref 1–4.8)
LYMPHOCYTES NFR BLD AUTO: 24 % (ref 24–48)
MAGNESIUM SERPL-MCNC: 2 MG/DL (ref 1.8–2.4)
MCH RBC QN AUTO: 27 PG (ref 25–35)
MCHC RBC AUTO-ENTMCNC: 32 G/DL (ref 31–37)
MCV RBC AUTO: 86 FL (ref 79–100)
METHADONE SERPL-MCNC: (no result) NG/ML
MONO #: 1.3 X10^3/UL (ref 0–1.1)
MONOCYTES NFR BLD: 14 % (ref 0–9)
NEUT #: 5.2 X10^3UL (ref 1.8–7.7)
NEUTROPHILS NFR BLD AUTO: 57 % (ref 31–73)
NITRITE UR QL STRIP: (no result)
O2 SAT BGAS: 96 % (ref 92–99)
O2/TOTAL GAS SETTING VFR VENT: 32 %
OPIATES UR-MCNC: (no result) NG/ML
PCP SERPL-MCNC: (no result) MG/DL
PLATELET # BLD AUTO: 236 X10^3/UL (ref 140–400)
POTASSIUM SERPL-SCNC: 3.9 MMOL/L (ref 3.5–5.1)
PROT SERPL-MCNC: 6.8 G/DL (ref 6.4–8.2)
RBC # BLD AUTO: 3.2 X10^6/UL (ref 3.5–5.4)
RBC #/AREA URNS HPF: 0 /HPF (ref 0–2)
SALIC: < 2.8 MG/DL (ref 2.8–20)
SODIUM SERPL-SCNC: 139 MMOL/L (ref 136–145)
SP GR UR STRIP: 1.02
SQUAMOUS #/AREA URNS LPF: (no result) /LPF
UROBILINOGEN UR-MCNC: 0.2 MG/DL
WBC # BLD AUTO: 9.2 X10^3/UL (ref 4–11)
WBC #/AREA URNS HPF: (no result) /HPF (ref 0–4)

## 2021-10-08 PROCEDURE — C9803 HOPD COVID-19 SPEC COLLECT: HCPCS

## 2021-10-08 PROCEDURE — 83605 ASSAY OF LACTIC ACID: CPT

## 2021-10-08 PROCEDURE — 85025 COMPLETE CBC W/AUTO DIFF WBC: CPT

## 2021-10-08 PROCEDURE — 82962 GLUCOSE BLOOD TEST: CPT

## 2021-10-08 PROCEDURE — 80048 BASIC METABOLIC PNL TOTAL CA: CPT

## 2021-10-08 PROCEDURE — G0480 DRUG TEST DEF 1-7 CLASSES: HCPCS

## 2021-10-08 PROCEDURE — 81001 URINALYSIS AUTO W/SCOPE: CPT

## 2021-10-08 PROCEDURE — 87071 CULTURE AEROBIC QUANT OTHER: CPT

## 2021-10-08 PROCEDURE — 70450 CT HEAD/BRAIN W/O DYE: CPT

## 2021-10-08 PROCEDURE — 83880 ASSAY OF NATRIURETIC PEPTIDE: CPT

## 2021-10-08 PROCEDURE — 83986 ASSAY PH BODY FLUID NOS: CPT

## 2021-10-08 PROCEDURE — 87070 CULTURE OTHR SPECIMN AEROBIC: CPT

## 2021-10-08 PROCEDURE — 94640 AIRWAY INHALATION TREATMENT: CPT

## 2021-10-08 PROCEDURE — 36415 COLL VENOUS BLD VENIPUNCTURE: CPT

## 2021-10-08 PROCEDURE — 89050 BODY FLUID CELL COUNT: CPT

## 2021-10-08 PROCEDURE — 93005 ELECTROCARDIOGRAM TRACING: CPT

## 2021-10-08 PROCEDURE — G0378 HOSPITAL OBSERVATION PER HR: HCPCS

## 2021-10-08 PROCEDURE — 71250 CT THORAX DX C-: CPT

## 2021-10-08 PROCEDURE — 87040 BLOOD CULTURE FOR BACTERIA: CPT

## 2021-10-08 PROCEDURE — U0003 INFECTIOUS AGENT DETECTION BY NUCLEIC ACID (DNA OR RNA); SEVERE ACUTE RESPIRATORY SYNDROME CORONAVIRUS 2 (SARS-COV-2) (CORONAVIRUS DISEASE [COVID-19]), AMPLIFIED PROBE TECHNIQUE, MAKING USE OF HIGH THROUGHPUT TECHNOLOGIES AS DESCRIBED BY CMS-2020-01-R: HCPCS

## 2021-10-08 PROCEDURE — 71045 X-RAY EXAM CHEST 1 VIEW: CPT

## 2021-10-08 PROCEDURE — 94760 N-INVAS EAR/PLS OXIMETRY 1: CPT

## 2021-10-08 PROCEDURE — 83735 ASSAY OF MAGNESIUM: CPT

## 2021-10-08 PROCEDURE — 82945 GLUCOSE OTHER FLUID: CPT

## 2021-10-08 PROCEDURE — 83690 ASSAY OF LIPASE: CPT

## 2021-10-08 PROCEDURE — 80076 HEPATIC FUNCTION PANEL: CPT

## 2021-10-08 PROCEDURE — 88305 TISSUE EXAM BY PATHOLOGIST: CPT

## 2021-10-08 PROCEDURE — 84157 ASSAY OF PROTEIN OTHER: CPT

## 2021-10-08 PROCEDURE — 84484 ASSAY OF TROPONIN QUANT: CPT

## 2021-10-08 PROCEDURE — 85610 PROTHROMBIN TIME: CPT

## 2021-10-08 PROCEDURE — 80329 ANALGESICS NON-OPIOID 1 OR 2: CPT

## 2021-10-08 PROCEDURE — 87205 SMEAR GRAM STAIN: CPT

## 2021-10-08 PROCEDURE — 87426 SARSCOV CORONAVIRUS AG IA: CPT

## 2021-10-08 PROCEDURE — 87075 CULTR BACTERIA EXCEPT BLOOD: CPT

## 2021-10-08 PROCEDURE — 80307 DRUG TEST PRSMV CHEM ANLYZR: CPT

## 2021-10-08 PROCEDURE — 80053 COMPREHEN METABOLIC PANEL: CPT

## 2021-10-08 PROCEDURE — 82550 ASSAY OF CK (CPK): CPT

## 2021-10-08 PROCEDURE — 99285 EMERGENCY DEPT VISIT HI MDM: CPT

## 2021-10-08 PROCEDURE — 82803 BLOOD GASES ANY COMBINATION: CPT

## 2021-10-08 PROCEDURE — 88112 CYTOPATH CELL ENHANCE TECH: CPT

## 2021-10-08 PROCEDURE — 32555 ASPIRATE PLEURA W/ IMAGING: CPT

## 2021-10-08 PROCEDURE — 96365 THER/PROPH/DIAG IV INF INIT: CPT

## 2021-10-08 PROCEDURE — 83615 LACTATE (LD) (LDH) ENZYME: CPT

## 2021-10-08 RX ADMIN — LINEZOLID SCH MG: 600 TABLET, FILM COATED ORAL at 21:32

## 2021-10-08 RX ADMIN — LATANOPROST SCH DROP: 50 SOLUTION OPHTHALMIC at 21:33

## 2021-10-08 RX ADMIN — IPRATROPIUM BROMIDE AND ALBUTEROL SCH PUFF: 20; 100 SPRAY, METERED RESPIRATORY (INHALATION) at 16:45

## 2021-10-08 RX ADMIN — IPRATROPIUM BROMIDE AND ALBUTEROL SULFATE SCH ML: .5; 3 SOLUTION RESPIRATORY (INHALATION) at 16:00

## 2021-10-08 RX ADMIN — ATORVASTATIN CALCIUM SCH MG: 10 TABLET, FILM COATED ORAL at 21:32

## 2021-10-08 RX ADMIN — APIXABAN SCH MG: 5 TABLET, FILM COATED ORAL at 21:32

## 2021-10-08 RX ADMIN — IPRATROPIUM BROMIDE AND ALBUTEROL SULFATE SCH ML: .5; 3 SOLUTION RESPIRATORY (INHALATION) at 20:00

## 2021-10-08 RX ADMIN — MONTELUKAST SODIUM SCH MG: 10 TABLET, FILM COATED ORAL at 21:32

## 2021-10-08 RX ADMIN — INSULIN LISPRO SCH UNITS: 100 INJECTION, SOLUTION INTRAVENOUS; SUBCUTANEOUS at 17:00

## 2021-10-08 RX ADMIN — DORZOLAMIDE HYDROCHLORIDE SCH DROP: 20 SOLUTION/ DROPS OPHTHALMIC at 21:33

## 2021-10-08 RX ADMIN — IPRATROPIUM BROMIDE AND ALBUTEROL SCH PUFF: 20; 100 SPRAY, METERED RESPIRATORY (INHALATION) at 21:32

## 2021-10-08 RX ADMIN — TIMOLOL MALEATE SCH DROP: 5 SOLUTION/ DROPS OPHTHALMIC at 21:32

## 2021-10-08 NOTE — PHYS DOC
Past History


Past Medical History:  Arthritis


Past Surgical History:  Other


Additional Past Surgical Histo:  R HIP FX REPAIR


Alcohol Use:  None





General Adult


EDM:


Chief Complaint:  ALTERED MENTAL STATUS





HPI:


HPI:


87 yo F PMH dementia, diabetes, right ankle pressure ulcer, asthma/COPD, 

paroxysmal A. fib (on eliquis) and CKD, presents the ED brought in by EMS from 

[facility] and rehab with concern for worsening mental status.  Per 

nursing home patient is less talkative.  Patient is normally alert but is 

disoriented due to her dementia.  Patient was recently admitted for ORIF for a 

hip fracture, complicated with pneumonia.  Pt was just started on Zyprexa.  

Patient resting comfortably in room and complains of right ankle pain.





Review of Systems:


Review of Systems:


ROS unobtainable due to dementia





Allergies:


Allergies:





Allergies








Coded Allergies Type Severity Reaction Last Updated Verified


 


  ibuprofen Allergy Severe Anaphylaxis 21 Yes


 


  Penicillins Allergy Intermediate  21 Yes











Physical Exam:


PE:





Constitutional: Sleeping comfortably, no acute distress, non-toxic appearance. 


HENT: Normocephalic, atraumatic, dry mucous membranes


Eyes: EOMI, conjunctiva normal, no discharge.  


Neck: Normal range of motion,  supple, 


Cardiovascular: S1/2 present, irregular rhythm


Lungs & Thorax: Speaking in full sentences, bilateral equal chest rise, no 

tachypnea or increased work of breathing


Abdomen:  soft, no tenderness, 


Skin: Warm, dry, no erythema, no rash. [] 


Extremities: no cyanosis, right lateral ankle with painful 2cm ulcer-no 

associated erythema or crepitus (dressing changed 10/6)


Neurologic: Alert, moves all 4 extremities,  no focal deficits noted,  clear 

speech-disorganized speech


Psychologic: Affect normal, calm mood





Current Patient Data:


Vital Signs:





                                   Vital Signs








  Date Time  Temp Pulse Resp B/P (MAP) Pulse Ox O2 Delivery O2 Flow Rate FiO2


 


10/8/21 09:34 98.6 117 16 109/53 (71) 98 Nasal Cannula 3.0 











EKG:


EKG:


Irregular irregular/A. fib at 1 3 bpm, no axis deviation, no ST elevation or ST 

depression





Radiology/Procedures:


Radiology/Procedures:


                                 IMAGING REPORT





                                     Signed





PATIENT: CRUZ VIVAS AACCOUNT: [id number]     MRN#: [id number]


: 1935           LOCATION: ER              AGE: 86


SEX: F                    EXAM DT: 10/08/21         ACCESSION#: [id number]


STATUS: REG ER            ORD. PHYSICIAN: LARRY GARCIA DO


REASON: ALTERED MENTAL STATUS


PROCEDURE: CT HEAD WO CONTRAST





Site ID: [id number]





EXAMINATION: CT HEAD/BRAIN WO.





TECHNIQUE: Noncontrast axial images of the brain were obtained with coronal and 

sagittal reconstructions.





One or more of the following radiation dose reduction techniques was used: 

automated exposure control, adjustment of mA and/or KV according to patient 

size, and/or utilization of iterative reconstruction technique.








HISTORY: 86 years  Female with altered mental status





Comparison exam: 2021





FINDINGS: There is no intracranial hemorrhage, edema or mass effect.  The brain 

parenchyma demonstrates periventricular and deep white matter hypodensities 

compatible with chronic microvascular ischemic changes.


Size of the ventricles is appropriate.





Mucosal thickening gas similar to the previous exam is seen in the maxillary 

sinuses. Other paranasal sinuses are from clear.





IMPRESSION: No acute process.





Electronically signed by: Winnie Carranza MD (10/8/2021 10:16 AM) [id number]














DICTATED AND SIGNED BY:     WINNIE CARRANZA MD


DATE:     10/08/21 1014





CC: MIGUEL LIPSCOMB MD; LARRY GARCIA DO ~MTH0 0


                                 IMAGING REPORT





                                     Signed





PATIENT: CRUZ VIVAS AACCOUNT: [id number]     MRN#: [id number]


: 1935           LOCATION: ER              AGE: 86


SEX: F                    EXAM DT: 10/08/21         ACCESSION#: [id number]


STATUS: REG ER            ORD. PHYSICIAN: LARRY GARCIA DO


REASON: ALTERED MENTAL STATUS


PROCEDURE: PORTABLE CHEST 1V





EXAM: Chest, single view.





HISTORY: Altered mental status.





COMPARISON: 2021





FINDINGS: A frontal view of the chest is obtained. There is a new moderate left 

pleural effusion. There has been interval increase in diffuse lower lobe 

predominant interstitial infiltrate. There is stable cardiomegaly. There is no 

pneumothorax. There is a suspected trace right pleural effusion.





IMPRESSION: 


1. Moderate left and trace right pleural effusion.


2. Diffuse lower lobe predominant interstitial infiltrate, increased compared to

 the prior study.





Electronically signed by: Lizeth Contreras MD (10/8/2021 10:21 AM) [id number]














DICTATED AND SIGNED BY:     LIZETH CONTRERAS MD


DATE:     10/08/21 1020





CC: MIGUEL LIPSCOMB MD; LARRY GARCIA DO ~MTH0 0





Heart Score:


C/O Chest Pain:  N/A


Risk Factors:


Risk Factors:  DM, Current or recent (<one month) smoker, HTN, HLP, family 

history of CAD, obesity.


Risk Scores:


Score 0 - 3:  2.5% MACE over next 6 weeks - Discharge Home


Score 4 - 6:  20.3% MACE over next 6 weeks - Admit for Clinical Observation


Score 7 - 10:  72.7% MACE over next 6 weeks - Early Invasive Strategies





Course & Med Decision Making:


Course & Med Decision Making


Pertinent Labs and Imaging studies reviewed. (See chart for details)





Concern for bilateral pleural effusions with infiltrate, parapneumonic effusion 

also no hypoxia reported nasal cannula.  Patient at her baseline mental status 

upon ED arrival.  Patient does not meet sepsis criteria.  I spoke to Dr. Vega-

patient was not speaking and unresponsive at the nursing home, has history of 

hypoglycemia.  Patient started on broad-spectrum antibiotics.  Patient's 

daughter and son at bedside-daughter request patient be transferred to 

[facility].  Patient accepted by Dr. Vega. Covid test and blood gas pending.





I have spoken with the patient and/or caregivers.  I have explained the 

patient's condition, diagnosis and treatment plan based on the information 

available to me at this time.  I have answered the patient's and/or caregivers 

questions and answered any concerns.  The patient and/or caregivers have as good

 an understanding of the patient's diagnosis, condition and treatment plan as 

can be expected at this point.  The patient has been stabilized within the 

capability of the emergency department.  The patient will be transported for 

further care and management or will be moved to an observation or inpatient 

service.  I have communicated with the staff or medical practitioner taking over

 this patient's care.





Dragon Disclaimer:


Dragon Disclaimer:


This electronic medical record was generated, in whole or in part, using a voice

 recognition dictation system.





Departure


Departure:


Impression:  


   Primary Impression:  


   Hypoxemia requiring supplemental oxygen


   Additional Impression:  


   Parapneumonic effusion


Disposition:  [facility] (to [facility], accepted by Dr. Vega)


Condition:  GUARDED


Referrals:  


MIGUEL LIPSCOMB MD (PCP)











LARRY GARCIA DO                Oct 8, 2021 10:43

## 2021-10-08 NOTE — RAD
Site ID: [id number]



EXAMINATION: CT HEAD/BRAIN WO.



TECHNIQUE: Noncontrast axial images of the brain were obtained with coronal and sagittal reconstructi
ons.



One or more of the following radiation dose reduction techniques was used: automated exposure control
, adjustment of mA and/or KV according to patient size, and/or utilization of iterative reconstructio
n technique.





HISTORY: 86 years  Female with altered mental status



Comparison exam: August 11, 2021



FINDINGS: There is no intracranial hemorrhage, edema or mass effect.  The brain parenchyma demonstrat
es periventricular and deep white matter hypodensities compatible with chronic microvascular ischemic
 changes.

Size of the ventricles is appropriate.



Mucosal thickening gas similar to the previous exam is seen in the maxillary sinuses. Other paranasal
 sinuses are from clear.



IMPRESSION: No acute process.



Electronically signed by: Kendrick Carranza MD (10/8/2021 10:16 AM) [id number]

## 2021-10-08 NOTE — RAD
EXAM: Chest, single view.



HISTORY: Altered mental status.



COMPARISON: 9/27/2021



FINDINGS: A frontal view of the chest is obtained. There is a new moderate left pleural effusion. The
re has been interval increase in diffuse lower lobe predominant interstitial infiltrate. There is sta
ble cardiomegaly. There is no pneumothorax. There is a suspected trace right pleural effusion.



IMPRESSION: 

1. Moderate left and trace right pleural effusion.

2. Diffuse lower lobe predominant interstitial infiltrate, increased compared to the prior study.



Electronically signed by: Lizeth Porter MD (10/8/2021 10:21 AM) [id number]

## 2021-10-08 NOTE — EKG
[facility] [address], [place] [address]

Test Date:    2021-10-08               Test Time:    10:25:51

Pat Name:     CRUZ VIVAS          Department:   

Patient ID:   [id number]           Room:          

Gender:       F                        Technician:   KIRSTIN

:          1935               Requested By: LARRY GARCIA

Order Number: [id number]            Reading MD:   Salinas Avalos

                                 Measurements

Intervals                              Axis          

Rate:         120                      P:            

WA:                                    QRS:          24

QRSD:         86                       T:            -52

QT:           338                                    

QTc:          483                                    

                           Interpretive Statements

ATRIAL FIBRILLATION

LOW VOLTAGE

T ABNORMALITY IN INFERIOR LEADS

ABNORMAL ECG

Electronically Signed On 10- 16:50:48 CDT by Salinas Avalos

## 2021-10-09 VITALS — DIASTOLIC BLOOD PRESSURE: 69 MMHG | SYSTOLIC BLOOD PRESSURE: 123 MMHG

## 2021-10-09 VITALS — SYSTOLIC BLOOD PRESSURE: 107 MMHG | DIASTOLIC BLOOD PRESSURE: 63 MMHG

## 2021-10-09 VITALS — SYSTOLIC BLOOD PRESSURE: 99 MMHG | DIASTOLIC BLOOD PRESSURE: 54 MMHG

## 2021-10-09 VITALS — DIASTOLIC BLOOD PRESSURE: 61 MMHG | SYSTOLIC BLOOD PRESSURE: 103 MMHG

## 2021-10-09 VITALS — DIASTOLIC BLOOD PRESSURE: 63 MMHG | SYSTOLIC BLOOD PRESSURE: 96 MMHG

## 2021-10-09 VITALS — DIASTOLIC BLOOD PRESSURE: 74 MMHG | SYSTOLIC BLOOD PRESSURE: 128 MMHG

## 2021-10-09 LAB
ALBUMIN SERPL-MCNC: 2.1 G/DL (ref 3.4–5)
ALBUMIN/GLOB SERPL: 0.5 {RATIO} (ref 1–1.7)
ALP SERPL-CCNC: 85 U/L (ref 46–116)
ALT SERPL-CCNC: 13 U/L (ref 14–59)
ANION GAP SERPL CALC-SCNC: 8 MMOL/L (ref 6–14)
AST SERPL-CCNC: 16 U/L (ref 15–37)
BASOPHILS # BLD AUTO: 0.1 X10^3/UL (ref 0–0.2)
BASOPHILS NFR BLD: 1 % (ref 0–3)
BILIRUB SERPL-MCNC: 0.3 MG/DL (ref 0.2–1)
BUN SERPL-MCNC: 16 MG/DL (ref 7–20)
BUN/CREAT SERPL: 13 (ref 6–20)
CALCIUM SERPL-MCNC: 8.7 MG/DL (ref 8.5–10.1)
CHLORIDE SERPL-SCNC: 105 MMOL/L (ref 98–107)
CO2 SERPL-SCNC: 27 MMOL/L (ref 21–32)
CREAT SERPL-MCNC: 1.2 MG/DL (ref 0.6–1)
EOSINOPHIL NFR BLD: 0.4 X10^3/UL (ref 0–0.7)
EOSINOPHIL NFR BLD: 4 % (ref 0–3)
ERYTHROCYTE [DISTWIDTH] IN BLOOD BY AUTOMATED COUNT: 17.4 % (ref 11.5–14.5)
GFR SERPLBLD BASED ON 1.73 SQ M-ARVRAT: 42.6 ML/MIN
GLUCOSE SERPL-MCNC: 78 MG/DL (ref 70–99)
HCT VFR BLD CALC: 27.9 % (ref 36–47)
HGB BLD-MCNC: 8.9 G/DL (ref 12–15.5)
LYMPHOCYTES # BLD: 1.5 X10^3/UL (ref 1–4.8)
LYMPHOCYTES NFR BLD AUTO: 16 % (ref 24–48)
MCH RBC QN AUTO: 28 PG (ref 25–35)
MCHC RBC AUTO-ENTMCNC: 32 G/DL (ref 31–37)
MCV RBC AUTO: 87 FL (ref 79–100)
MONO #: 1.2 X10^3/UL (ref 0–1.1)
MONOCYTES NFR BLD: 13 % (ref 0–9)
NEUT #: 6.2 X10^3/UL (ref 1.8–7.7)
NEUTROPHILS NFR BLD AUTO: 66 % (ref 31–73)
PLATELET # BLD AUTO: 260 X10^3/UL (ref 140–400)
POTASSIUM SERPL-SCNC: 4.1 MMOL/L (ref 3.5–5.1)
PROT SERPL-MCNC: 6.7 G/DL (ref 6.4–8.2)
RBC # BLD AUTO: 3.22 X10^6/UL (ref 3.5–5.4)
SODIUM SERPL-SCNC: 140 MMOL/L (ref 136–145)
WBC # BLD AUTO: 9.4 X10^3/UL (ref 4–11)

## 2021-10-09 RX ADMIN — LOSARTAN POTASSIUM SCH MG: 25 TABLET, FILM COATED ORAL at 08:54

## 2021-10-09 RX ADMIN — INSULIN LISPRO SCH UNITS: 100 INJECTION, SOLUTION INTRAVENOUS; SUBCUTANEOUS at 08:00

## 2021-10-09 RX ADMIN — INSULIN LISPRO SCH UNITS: 100 INJECTION, SOLUTION INTRAVENOUS; SUBCUTANEOUS at 11:02

## 2021-10-09 RX ADMIN — DORZOLAMIDE HYDROCHLORIDE SCH DROP: 20 SOLUTION/ DROPS OPHTHALMIC at 08:31

## 2021-10-09 RX ADMIN — IPRATROPIUM BROMIDE AND ALBUTEROL SCH PUFF: 20; 100 SPRAY, METERED RESPIRATORY (INHALATION) at 08:55

## 2021-10-09 RX ADMIN — IPRATROPIUM BROMIDE AND ALBUTEROL SULFATE SCH ML: .5; 3 SOLUTION RESPIRATORY (INHALATION) at 11:35

## 2021-10-09 RX ADMIN — ATORVASTATIN CALCIUM SCH MG: 10 TABLET, FILM COATED ORAL at 21:00

## 2021-10-09 RX ADMIN — LATANOPROST SCH DROP: 50 SOLUTION OPHTHALMIC at 08:32

## 2021-10-09 RX ADMIN — INSULIN LISPRO SCH UNITS: 100 INJECTION, SOLUTION INTRAVENOUS; SUBCUTANEOUS at 17:00

## 2021-10-09 RX ADMIN — LINEZOLID SCH MG: 600 TABLET, FILM COATED ORAL at 21:00

## 2021-10-09 RX ADMIN — CEFEPIME SCH GM: 1 INJECTION, POWDER, FOR SOLUTION INTRAMUSCULAR; INTRAVENOUS at 08:31

## 2021-10-09 RX ADMIN — IPRATROPIUM BROMIDE AND ALBUTEROL SULFATE SCH ML: .5; 3 SOLUTION RESPIRATORY (INHALATION) at 15:47

## 2021-10-09 RX ADMIN — DORZOLAMIDE HYDROCHLORIDE SCH DROP: 20 SOLUTION/ DROPS OPHTHALMIC at 21:03

## 2021-10-09 RX ADMIN — Medication SCH CAP: at 21:00

## 2021-10-09 RX ADMIN — TIMOLOL MALEATE SCH DROP: 5 SOLUTION/ DROPS OPHTHALMIC at 08:31

## 2021-10-09 RX ADMIN — TIMOLOL MALEATE SCH DROP: 5 SOLUTION/ DROPS OPHTHALMIC at 21:03

## 2021-10-09 RX ADMIN — MONTELUKAST SODIUM SCH MG: 10 TABLET, FILM COATED ORAL at 21:00

## 2021-10-09 RX ADMIN — CEFEPIME SCH GM: 1 INJECTION, POWDER, FOR SOLUTION INTRAMUSCULAR; INTRAVENOUS at 21:01

## 2021-10-09 RX ADMIN — APIXABAN SCH MG: 5 TABLET, FILM COATED ORAL at 08:55

## 2021-10-09 RX ADMIN — IPRATROPIUM BROMIDE AND ALBUTEROL SULFATE SCH ML: .5; 3 SOLUTION RESPIRATORY (INHALATION) at 20:24

## 2021-10-09 RX ADMIN — LINEZOLID SCH MG: 600 TABLET, FILM COATED ORAL at 08:55

## 2021-10-09 RX ADMIN — IPRATROPIUM BROMIDE AND ALBUTEROL SULFATE SCH ML: .5; 3 SOLUTION RESPIRATORY (INHALATION) at 07:39

## 2021-10-09 RX ADMIN — LATANOPROST SCH DROP: 50 SOLUTION OPHTHALMIC at 21:03

## 2021-10-09 RX ADMIN — APIXABAN SCH MG: 5 TABLET, FILM COATED ORAL at 21:00

## 2021-10-09 RX ADMIN — FUROSEMIDE SCH MG: 10 INJECTION, SOLUTION INTRAMUSCULAR; INTRAVENOUS at 08:54

## 2021-10-09 NOTE — PN
DATE: 10/09/2021

SUBJECTIVE:  The patient is resting, slightly propped up in bed, in no apparent 

respiratory distress.  She is awake, alert, eating her breakfast.  On 

questioning her, she did complain of shortness of breath and cough.  Denied any 

chills, rigors or fever.



PHYSICAL EXAMINATION:

GENERAL:  When I examined her, she was pale, but no jaundice, cyanosis or 

thyromegaly.  No jugular venous distention.  No lower limb edema.

VITAL SIGNS:  Her heart rate was 119, blood pressure was 110/66, temperature 

98.1, respiratory rate was 20 and oxygen saturation was 96% on 4 liters of 

oxygen.

HEAD, EYES, EARS, NOSE, AND THROAT:   Normocephalic, atraumatic.

NECK:  Supple.

HEART:  Showed normal first and second heart sounds, no gallop, rub or murmur.

CHEST:  Showed central trachea, bilateral equal reduced expansion and air entry,

vesicular breath sounds anteriorly.  I could not appreciate any crepitation or 

rhonchi posteriorly. There is dull percussion noted and absent breath sounds, 

more so on the left than right.

ABDOMEN:  Distended, soft, nontender.

NEUROLOGIC:  She is demented; however, without any obvious lateralizing sign.  

All her cranial nerves intact.  She moves extremities without difficulty.



Her intake and output are incompletely recorded.



LABORATORY DATA:  Her lab work still pending at the time of this dictation.



ASSESSMENT:

1.  Acute hypoxic respiratory failure for which she is now on 4 liters of 

oxygen.

2.  Acute on chronic diastolic congestive heart failure.

3.  Chronic obstructive pulmonary disease exacerbation.

4.  Aspiration pneumonia.

5.  Atrial fibrillation versus multifocal atrial tachycardia for which she is on

diltiazem.

6.  She has right lower extremity deep vein thrombosis, for which she is on 

apixaban.



PLAN:  To obviously continue with IV antibiotic as recommended by Infectious 

Disease specialist.  She is now on cefepime and Zyvox.  Meanwhile, we will 

continue with all other medications.  She is also on Lasix 40 mg IV daily, 

diltiazem to control the heart rate and she is on low dose sliding scale.







AMM/KRI

DR: AMM/nts   DD: 10/09/2021 09:39

DT: 10/09/2021 10:20   TID: [id number]

## 2021-10-09 NOTE — PDOC2
CONSULT


Date of Consult


Date of Consult


DATE: 10/9/21 


TIME: 11:02





Reason for Consult


Reason for Consult:


Congestive heart failure and atrial fibrillation





Referring Physician


Referring Physician:


Dr. Vega





Identification/Chief Complaint


Chief Complaint


Altered mental status





Source


Source:  Chart review, Patient





History of Present Illness


Reason for Visit:


86-year-old female who was undergoing rehabilitation at [place] after surgical

fixation of hip fracture and complicated pneumonia was initially brought to [facility] ED with mental status changes.  She was found to have acute on chronic 

diastolic heart failure, atrial fibrillation with RVR and possibly healthcare 

associated pneumonia and transferred to [facility] for further management.  Upon my 

exam, she was feeling better and denied any chest pain, orthopnea/PND, 

palpitations or syncope.





Past Medical History


Past Medical History


Paroxysmal atrial fibrillation usually followed by [facility] cardiology


Chronic diastolic heart failure


Hyperlipidemia


Hypertension


Thoracic aortic aneurysm


COPD


Chronic kidney disease





Past Surgical History


Past Surgical History


Bilateral cataract extraction


Hysterectomy


Surgical fixation of hip fracture





Family History


Family History


Coronary artery disease


Stroke





Social History


Social History


Patient is a non-smoker and nondrinker and used to work at [employer]





Current Medications


Current Medications





Current Medications


Acetaminophen (Tylenol) 650 mg PRN Q6HRS  PRN PO MILD PAIN / TEMP > 100.3'F;  

Start 10/8/21 at 15:30


Apixaban (Eliquis) 5 mg BID PO  Last administered on 10/9/21at 08:55;  Start 

10/8/21 at 21:00


Atorvastatin Calcium (Lipitor) 10 mg HS PO  Last administered on 10/8/21at 

21:32;  Start 10/8/21 at 21:00


Albuterol/ Ipratropium (Duoneb) 3 ml RTQID NEB  Last administered on 10/9/21at 

07:39;  Start 10/8/21 at 16:00


Latanoprost (Xalatan) 1 drop BID OU  Last administered on 10/9/21at 08:32;  

Start 10/8/21 at 21:00


Losartan Potassium (Cozaar) 25 mg DAILY PO  Last administered on 10/9/21at 

08:54;  Start 10/9/21 at 09:00


Montelukast Sodium (Singulair) 10 mg HS PO  Last administered on 10/8/21at 

21:32;  Start 10/8/21 at 21:00


Non-Formulary Medication (Albuterol Sulfate (Ventolin Hfa Inhaler)) 2 puff PRN 

Q4HRS  PRN INH WHEEZING;  Start 10/8/21 at 15:30;  Status UNV


Diltiazem HCl (Cardizem 24hr Cd) 180 mg DAILY PO  Last administered on 10/9/21at

08:53;  Start 10/9/21 at 09:00


Dorzolamide HCl (Trusopt) 1 drop BID OU  Last administered on 10/9/21at 08:31;  

Start 10/8/21 at 21:00


Furosemide (Lasix) 40 mg DAILY IVP  Last administered on 10/9/21at 08:54;  Start

10/9/21 at 09:00


Insulin Human Lispro (HumaLOG) 0-5 UNITS TIDWMEALS SQ ;  Start 10/8/21 at 17:00


Dextrose (Dextrose 50%-Water Syringe) 12.5 gm PRN Q15MIN  PRN IV SEE COMMENTS;  

Start 10/8/21 at 15:30


Linezolid (Zyvox) 600 mg BID PO  Last administered on 10/9/21at 08:55;  Start 

10/8/21 at 21:00


Levofloxacin/ Dextrose 150 ml @  100 mls/hr Q48H IV  Last administered on 

10/8/21at 16:45;  Start 10/8/21 at 16:00;  Stop 10/9/21 at 15:59


Timolol Maleate (Timoptic 0.5% Ophth) 1 drop BID OU  Last administered on 

10/9/21at 08:31;  Start 10/8/21 at 21:00


Albuterol Sulfate (Ventolin Neb Soln) 2.5 mg PRN Q4HRS  PRN NEB SHORTNESS OF 

BREATH Last administered on 10/9/21at 03:47;  Start 10/8/21 at 16:00


Info (Anti-Coagulation Monitoring By Pharmacy) 1 each PRN DAILY  PRN MC PER 

PROTOCOL;  Start 10/8/21 at 16:00


Albuterol Sulfate (Ventolin Hfa) 1 puff PRN Q4HRS  PRN INH WHEEZING;  Start 

10/8/21 at 16:15


Albuterol/ Ipratropium (Combivent Respimat  Mcg) 1 puff RTQID INH  Last 

administered on 10/9/21at 08:55;  Start 10/8/21 at 16:00


Cefepime HCl (Maxipime) 1 gm Q12HR IVP  Last administered on 10/9/21at 08:31;  

Start 10/9/21 at 09:00





Active Scripts


Active


Ativan (Lorazepam) 1 Mg Tablet 1 Mg PO PRN BID PRN 7 Days


Hydrocodone-Apap 5-325  ** (Hydrocodone Bit/Acetaminophen) 1 Tab Tablet 1 Tab PO

PRN Q6-8HRS PRN 7 Days


Reported


Diltiazem 24Hr ER (LA) (Diltiazem HCl) 180 Mg Tab.er.24h 1 Tab PO DAILY 30 Days


Xalatan (Latanoprost) 2.5 Ml Drops 1 Drop OU BID


Triamterene-Hctz 37.5-25 Mg Cp (Triamterene/Hydrochlorothiazid) 1 Each Capsule 1

Cap PO DAILY


Ventolin Hfa Inhaler (Albuterol Sulfate) 18 Gm Hfa.aer.ad 2 Puff INH PRN Q4HRS 

PRN


Singulair Tablet ** (Montelukast Sodium) 10 Mg Tablet 10 Mg PO HS


Duoneb 0.5-3(2.5) Mg/3 Ml (Albuterol/Ipratropium) 3 Ml Ampul.neb 3 Ml NEB QID


Lantus Solostar (Insulin Glargine,Hum.rec.anlog) 100 Unit/1 Ml Insuln.pen 30 

Unit SQ QHS


Eliquis (Apixaban) 5 Mg Tablet 5 Mg PO BID


Cozaar ** (Losartan Potassium) 25 Mg Tablet 25 Mg PO DAILY


Cosopt Eye Drops (Dorzolamide Hcl/Timolol Maleat) 10 Ml Drops 1 Drop EACHEYE BID


Atorvastatin Calcium 10 Mg Tablet 10 Mg PO HS


Admelog (Insulin Lispro) 100 Unit/1 Ml Vial 8 Unit SQ TIDAC


Admelog (Insulin Lispro) 100 Unit/1 Ml Vial 0-12 Unit SQ TIDWMEALS


Acetaminophen 325 Mg Tablet 2 Tab PO PRN Q6HRS PRN 30 Days





Allergies


Allergies:  


Coded Allergies:  


     Penicillins (Verified  Allergy, Intermediate, 10/9/21)


   Has tolerated keflex


     ibuprofen (Verified  Allergy, Intermediate, 9/9/21)





ROS


General:  YES: Fatigue


PSYCHOLOGICAL ROS:  No: Hallucinations


Eyes:  No Loss of vision


HEENT:  No: Epistaxis


Respiratory:  YES: Shortness of breath; 


   No: Hemoptysis


Cardiovascular:  No Chest Pain


Gastrointestinal:  No Vomiting


Genitourinary:  No Dysuria, No Hematuria


Neurological:  Yes Confusion; 


   No Seizures





Physical Exam


General:  Alert, No acute distress


HEENT:  Atraumatic


Lungs:  Other (Decreased air entry bases)


Heart:  Other (Heart rate irregular)


Abdomen:  Soft


Extremities:  No edema


Neuro:  Normal speech


Psych/Mental Status:  Mood NL





Vitals


VITALS





Vital Signs








  Date Time  Temp Pulse Resp B/P (MAP) Pulse Ox O2 Delivery O2 Flow Rate FiO2


 


10/9/21 08:54  119  110/66    


 


10/9/21 07:40     98 Nasal Cannula 4.0 


 


10/9/21 07:00 98.1  20     





 98.1       











Labs


Labs





Laboratory Tests








Test


 10/9/21


07:41 10/9/21


08:50 10/9/21


10:57


 


Glucose (Fingerstick)


 83 mg/dL


(70-99) 


 141 mg/dL


(70-99)


 


White Blood Count


 


 9.4 x10^3/uL


(4.0-11.0) 





 


Red Blood Count


 


 3.22 x10^6/uL


(3.50-5.40) 





 


Hemoglobin


 


 8.9 g/dL


(12.0-15.5) 





 


Hematocrit


 


 27.9 %


(36.0-47.0) 





 


Mean Corpuscular Volume  87 fL ()  


 


Mean Corpuscular Hemoglobin  28 pg (25-35)  


 


Mean Corpuscular Hemoglobin


Concent 


 32 g/dL


(31-37) 





 


Red Cell Distribution Width


 


 17.4 %


(11.5-14.5) 





 


Platelet Count


 


 260 x10^3/uL


(140-400) 





 


Neutrophils (%) (Auto)  66 % (31-73)  


 


Lymphocytes (%) (Auto)  16 % (24-48)  


 


Monocytes (%) (Auto)  13 % (0-9)  


 


Eosinophils (%) (Auto)  4 % (0-3)  


 


Basophils (%) (Auto)  1 % (0-3)  


 


Neutrophils # (Auto)


 


 6.2 x10^3/uL


(1.8-7.7) 





 


Lymphocytes # (Auto)


 


 1.5 x10^3/uL


(1.0-4.8) 





 


Monocytes # (Auto)


 


 1.2 x10^3/uL


(0.0-1.1) 





 


Eosinophils # (Auto)


 


 0.4 x10^3/uL


(0.0-0.7) 





 


Basophils # (Auto)


 


 0.1 x10^3/uL


(0.0-0.2) 





 


Sodium Level


 


 140 mmol/L


(136-145) 





 


Potassium Level


 


 4.1 mmol/L


(3.5-5.1) 





 


Chloride Level


 


 105 mmol/L


() 





 


Carbon Dioxide Level


 


 27 mmol/L


(21-32) 





 


Anion Gap  8 (6-14)  


 


Blood Urea Nitrogen


 


 16 mg/dL


(7-20) 





 


Creatinine


 


 1.2 mg/dL


(0.6-1.0) 





 


Estimated GFR


(Cockcroft-Gault) 


 42.6 


 





 


BUN/Creatinine Ratio  13 (6-20)  


 


Glucose Level


 


 78 mg/dL


(70-99) 





 


Calcium Level


 


 8.7 mg/dL


(8.5-10.1) 





 


Total Bilirubin


 


 0.3 mg/dL


(0.2-1.0) 





 


Aspartate Amino Transf


(AST/SGOT) 


 16 U/L (15-37) 


 





 


Alanine Aminotransferase


(ALT/SGPT) 


 13 U/L (14-59) 


 





 


Alkaline Phosphatase


 


 85 U/L


() 





 


Total Protein


 


 6.7 g/dL


(6.4-8.2) 





 


Albumin


 


 2.1 g/dL


(3.4-5.0) 





 


Albumin/Globulin Ratio  0.5 (1.0-1.7)  








Laboratory Tests








Test


 10/9/21


07:41 10/9/21


08:50 10/9/21


10:57


 


Glucose (Fingerstick)


 83 mg/dL


(70-99) 


 141 mg/dL


(70-99)


 


White Blood Count


 


 9.4 x10^3/uL


(4.0-11.0) 





 


Red Blood Count


 


 3.22 x10^6/uL


(3.50-5.40) 





 


Hemoglobin


 


 8.9 g/dL


(12.0-15.5) 





 


Hematocrit


 


 27.9 %


(36.0-47.0) 





 


Mean Corpuscular Volume  87 fL ()  


 


Mean Corpuscular Hemoglobin  28 pg (25-35)  


 


Mean Corpuscular Hemoglobin


Concent 


 32 g/dL


(31-37) 





 


Red Cell Distribution Width


 


 17.4 %


(11.5-14.5) 





 


Platelet Count


 


 260 x10^3/uL


(140-400) 





 


Neutrophils (%) (Auto)  66 % (31-73)  


 


Lymphocytes (%) (Auto)  16 % (24-48)  


 


Monocytes (%) (Auto)  13 % (0-9)  


 


Eosinophils (%) (Auto)  4 % (0-3)  


 


Basophils (%) (Auto)  1 % (0-3)  


 


Neutrophils # (Auto)


 


 6.2 x10^3/uL


(1.8-7.7) 





 


Lymphocytes # (Auto)


 


 1.5 x10^3/uL


(1.0-4.8) 





 


Monocytes # (Auto)


 


 1.2 x10^3/uL


(0.0-1.1) 





 


Eosinophils # (Auto)


 


 0.4 x10^3/uL


(0.0-0.7) 





 


Basophils # (Auto)


 


 0.1 x10^3/uL


(0.0-0.2) 





 


Sodium Level


 


 140 mmol/L


(136-145) 





 


Potassium Level


 


 4.1 mmol/L


(3.5-5.1) 





 


Chloride Level


 


 105 mmol/L


() 





 


Carbon Dioxide Level


 


 27 mmol/L


(21-32) 





 


Anion Gap  8 (6-14)  


 


Blood Urea Nitrogen


 


 16 mg/dL


(7-20) 





 


Creatinine


 


 1.2 mg/dL


(0.6-1.0) 





 


Estimated GFR


(Cockcroft-Gault) 


 42.6 


 





 


BUN/Creatinine Ratio  13 (6-20)  


 


Glucose Level


 


 78 mg/dL


(70-99) 





 


Calcium Level


 


 8.7 mg/dL


(8.5-10.1) 





 


Total Bilirubin


 


 0.3 mg/dL


(0.2-1.0) 





 


Aspartate Amino Transf


(AST/SGOT) 


 16 U/L (15-37) 


 





 


Alanine Aminotransferase


(ALT/SGPT) 


 13 U/L (14-59) 


 





 


Alkaline Phosphatase


 


 85 U/L


() 





 


Total Protein


 


 6.7 g/dL


(6.4-8.2) 





 


Albumin


 


 2.1 g/dL


(3.4-5.0) 





 


Albumin/Globulin Ratio  0.5 (1.0-1.7)  











Assessment/Plan


Assessment/Plan


1.  Acute hypoxic respiratory failure, multifactorial secondary to combination 

of acute on chronic diastolic heart failure and possible pneumonia.  Chest x-ray

showed moderate left and trace right pleural effusions and diffuse lower lobe 

infiltrates.  CT scan of the chest pending.  Pulmonary and ID teams following.


2.  Acute on chronic diastolic heart failure: Better compensated after diuresis.

 2D echo in May 2021 showed LVEF 55 to 60%, grade 2 diastolic dysfunction and 

mild aortic stenosis.  Lexiscan nuclear stress test at that time did not show 

any significant ischemia.  Continue current medical regimen.


3.  Paroxysmal atrial fibrillation, presenting with atrial fibrillation with 

RVR.  Heart rate better controlled presently with diltiazem.  Continue Eliquis 

for stroke prophylaxis.


4.  Hypertension: Controlled


5.  Hyperlipidemia: Continue statins


6.  Diabetes mellitus type 2: Treat per IM


Thank you for your consultation











ADAL MCHUGH MD            Oct 9, 2021 11:03

## 2021-10-09 NOTE — PDOC
PULMONARY PROGRESS NOTES


DATE: 10/9/21 


TIME: 12:52


Vitals





Vital Signs








  Date Time  Temp Pulse Resp B/P (MAP) Pulse Ox O2 Delivery O2 Flow Rate FiO2


 


10/9/21 08:54  119  110/66    


 


10/9/21 08:00      Nasal Cannula 4.0 


 


10/9/21 07:40     98   


 


10/9/21 07:00 98.1  20     





 98.1       








Lungs:  Clear


Labs





Laboratory Tests








Test


 10/9/21


07:41 10/9/21


08:50 10/9/21


10:57


 


Glucose (Fingerstick)


 83 mg/dL


(70-99) 


 141 mg/dL


(70-99)


 


White Blood Count


 


 9.4 x10^3/uL


(4.0-11.0) 





 


Red Blood Count


 


 3.22 x10^6/uL


(3.50-5.40) 





 


Hemoglobin


 


 8.9 g/dL


(12.0-15.5) 





 


Hematocrit


 


 27.9 %


(36.0-47.0) 





 


Mean Corpuscular Volume  87 fL ()  


 


Mean Corpuscular Hemoglobin  28 pg (25-35)  


 


Mean Corpuscular Hemoglobin


Concent 


 32 g/dL


(31-37) 





 


Red Cell Distribution Width


 


 17.4 %


(11.5-14.5) 





 


Platelet Count


 


 260 x10^3/uL


(140-400) 





 


Neutrophils (%) (Auto)  66 % (31-73)  


 


Lymphocytes (%) (Auto)  16 % (24-48)  


 


Monocytes (%) (Auto)  13 % (0-9)  


 


Eosinophils (%) (Auto)  4 % (0-3)  


 


Basophils (%) (Auto)  1 % (0-3)  


 


Neutrophils # (Auto)


 


 6.2 x10^3/uL


(1.8-7.7) 





 


Lymphocytes # (Auto)


 


 1.5 x10^3/uL


(1.0-4.8) 





 


Monocytes # (Auto)


 


 1.2 x10^3/uL


(0.0-1.1) 





 


Eosinophils # (Auto)


 


 0.4 x10^3/uL


(0.0-0.7) 





 


Basophils # (Auto)


 


 0.1 x10^3/uL


(0.0-0.2) 





 


Sodium Level


 


 140 mmol/L


(136-145) 





 


Potassium Level


 


 4.1 mmol/L


(3.5-5.1) 





 


Chloride Level


 


 105 mmol/L


() 





 


Carbon Dioxide Level


 


 27 mmol/L


(21-32) 





 


Anion Gap  8 (6-14)  


 


Blood Urea Nitrogen


 


 16 mg/dL


(7-20) 





 


Creatinine


 


 1.2 mg/dL


(0.6-1.0) 





 


Estimated GFR


(Cockcroft-Gault) 


 42.6 


 





 


BUN/Creatinine Ratio  13 (6-20)  


 


Glucose Level


 


 78 mg/dL


(70-99) 





 


Calcium Level


 


 8.7 mg/dL


(8.5-10.1) 





 


Total Bilirubin


 


 0.3 mg/dL


(0.2-1.0) 





 


Aspartate Amino Transf


(AST/SGOT) 


 16 U/L (15-37) 


 





 


Alanine Aminotransferase


(ALT/SGPT) 


 13 U/L (14-59) 


 





 


Alkaline Phosphatase


 


 85 U/L


() 





 


Total Protein


 


 6.7 g/dL


(6.4-8.2) 





 


Albumin


 


 2.1 g/dL


(3.4-5.0) 





 


Albumin/Globulin Ratio  0.5 (1.0-1.7)  








Laboratory Tests








Test


 10/9/21


07:41 10/9/21


08:50 10/9/21


10:57


 


Glucose (Fingerstick)


 83 mg/dL


(70-99) 


 141 mg/dL


(70-99)


 


White Blood Count


 


 9.4 x10^3/uL


(4.0-11.0) 





 


Red Blood Count


 


 3.22 x10^6/uL


(3.50-5.40) 





 


Hemoglobin


 


 8.9 g/dL


(12.0-15.5) 





 


Hematocrit


 


 27.9 %


(36.0-47.0) 





 


Mean Corpuscular Volume  87 fL ()  


 


Mean Corpuscular Hemoglobin  28 pg (25-35)  


 


Mean Corpuscular Hemoglobin


Concent 


 32 g/dL


(31-37) 





 


Red Cell Distribution Width


 


 17.4 %


(11.5-14.5) 





 


Platelet Count


 


 260 x10^3/uL


(140-400) 





 


Neutrophils (%) (Auto)  66 % (31-73)  


 


Lymphocytes (%) (Auto)  16 % (24-48)  


 


Monocytes (%) (Auto)  13 % (0-9)  


 


Eosinophils (%) (Auto)  4 % (0-3)  


 


Basophils (%) (Auto)  1 % (0-3)  


 


Neutrophils # (Auto)


 


 6.2 x10^3/uL


(1.8-7.7) 





 


Lymphocytes # (Auto)


 


 1.5 x10^3/uL


(1.0-4.8) 





 


Monocytes # (Auto)


 


 1.2 x10^3/uL


(0.0-1.1) 





 


Eosinophils # (Auto)


 


 0.4 x10^3/uL


(0.0-0.7) 





 


Basophils # (Auto)


 


 0.1 x10^3/uL


(0.0-0.2) 





 


Sodium Level


 


 140 mmol/L


(136-145) 





 


Potassium Level


 


 4.1 mmol/L


(3.5-5.1) 





 


Chloride Level


 


 105 mmol/L


() 





 


Carbon Dioxide Level


 


 27 mmol/L


(21-32) 





 


Anion Gap  8 (6-14)  


 


Blood Urea Nitrogen


 


 16 mg/dL


(7-20) 





 


Creatinine


 


 1.2 mg/dL


(0.6-1.0) 





 


Estimated GFR


(Cockcroft-Gault) 


 42.6 


 





 


BUN/Creatinine Ratio  13 (6-20)  


 


Glucose Level


 


 78 mg/dL


(70-99) 





 


Calcium Level


 


 8.7 mg/dL


(8.5-10.1) 





 


Total Bilirubin


 


 0.3 mg/dL


(0.2-1.0) 





 


Aspartate Amino Transf


(AST/SGOT) 


 16 U/L (15-37) 


 





 


Alanine Aminotransferase


(ALT/SGPT) 


 13 U/L (14-59) 


 





 


Alkaline Phosphatase


 


 85 U/L


() 





 


Total Protein


 


 6.7 g/dL


(6.4-8.2) 





 


Albumin


 


 2.1 g/dL


(3.4-5.0) 





 


Albumin/Globulin Ratio  0.5 (1.0-1.7)  








Medications





Active Scripts








 Medications  Dose


 Route/Sig


 Max Daily Dose Days Date Category


 


 Ativan


  (Lorazepam) 1 Mg


 Tablet  1 Mg


 PO PRN BID PRN


  7 9/13/21 Rx


 


 Hydrocodone-Apap


 5-325  **


  (Hydrocodone


 Bit/Acetaminophen)


 1 Tab Tablet  1 Tab


 PO PRN Q6-8HRS PRN


  7 9/13/21 Rx


 


 Diltiazem 24Hr ER


  (LA) (Diltiazem


 HCl) 180 Mg


 Tab.er.24h  1 Tab


 PO DAILY


  30 9/10/21 Reported


 


 Xalatan


  (Latanoprost) 2.5


 Ml Drops  1 Drop


 OU BID


   9/8/21 Reported


 


 Triamterene-Hctz


 37.5-25 Mg Cp


  (Triamterene/Hydrochlorothiazid)


 1 Each Capsule  1 Cap


 PO DAILY


   9/8/21 Reported


 


 Ventolin Hfa


 Inhaler


  (Albuterol


 Sulfate) 18 Gm


 Hfa.aer.ad  2 Puff


 INH PRN Q4HRS PRN


   9/8/21 Reported


 


 Singulair Tablet


 ** (Montelukast


 Sodium) 10 Mg


 Tablet  10 Mg


 PO HS


   9/8/21 Reported


 


 Duoneb 0.5-3(2.5)


 Mg/3 Ml


  (Albuterol/Ipratropium)


 3 Ml Ampul.neb  3 Ml


 NEB QID


   9/8/21 Reported


 


 Lantus Solostar


  (Insulin


 Glargine,Hum.rec.anlog)


 100 Unit/1 Ml


 Insuln.pen  30 Unit


 SQ QHS


   9/8/21 Reported


 


 Eliquis


  (Apixaban) 5 Mg


 Tablet  5 Mg


 PO BID


   9/8/21 Reported


 


 Cozaar **


  (Losartan


 Potassium) 25 Mg


 Tablet  25 Mg


 PO DAILY


   9/8/21 Reported


 


 Cosopt Eye Drops


  (Dorzolamide


 Hcl/Timolol


 Maleat) 10 Ml


 Drops  1 Drop


 EACHEYE BID


   9/8/21 Reported


 


 Atorvastatin


 Calcium 10 Mg


 Tablet  10 Mg


 PO HS


   9/8/21 Reported


 


 Admelog (Insulin


 Lispro) 100


 Unit/1 Ml Vial  8 Unit


 SQ TIDAC


   9/8/21 Reported


 


 Admelog (Insulin


 Lispro) 100


 Unit/1 Ml Vial  0-12 Unit


 SQ TIDWMEALS


   9/8/21 Reported


 


 Acetaminophen 325


 Mg Tablet  2 Tab


 PO PRN Q6HRS PRN


  30 9/8/21 Reported











Impression


.


Acute hypoxemic respiratory failure, multifactorial





Abnormal chest x-ray





Possible pulmonary edema, possible pneumonia





Obtain CT chest











KADE PARSONS MD               Oct 9, 2021 12:52

## 2021-10-09 NOTE — CONS
DATE OF CONSULTATION: 10/09/2021

ATTENDING PHYSICIAN:  Kimberly Vega MD.



REASON FOR CONSULTATION:  The patient is seen in pulmonary consultation at the 

request of Dr. Vega for abnormal x-ray.



HISTORY OF PRESENT ILLNESS:  The patient is an 86-year-old with a history of 

dementia.  She presented from nursing home with a history of type 2 diabetes, 

neuropathy, and major depressive disorder.



The patient was seen in the emergency room on 10/08 with altered mental status. 

There was also concern about her x-ray.  She was transferred to [place] for 

further care.



She recently was admitted for ORIF for hip fracture complicated by pneumonia.  

She has been seen by Infectious Disease service.  She is currently on multiple 

antibiotics including cefepime and linezolid.



The patient is currently on oxygen supplementation.  She is awake.  She states 

that she wants to die.  Otherwise, no significant history is obtained per the 

patient herself.



I reviewed her x-ray, which revealed bilateral pulmonary infiltrates and 

left-sided effusion.



PAST MEDICAL HISTORY:  Diabetes; COPD, apparently according to the patient, she 

has never smoked.  There is a history of paroxysmal AFib, chronic kidney 

disease, dementia.



REVIEW OF SYSTEMS:  As indicated above, otherwise other systems could not be 

adequately reviewed.



ALLERGIES:  PENICILLIN AND IBUPROFEN.



CURRENT MEDICATIONS:  List was reviewed.



PHYSICAL EXAMINATION:

VITAL SIGNS:  Stable.  O2 saturation was greater than 92%, currently on 3 

liters.  Since admission, she has been afebrile.

HEENT:  Eyes:  The sclerae were nonicteric.

NECK:  Jugular venous distention could not be assessed secondary to body 

habitus.

CHEST:  Full expansion.

LUNGS:  Diminished breath sounds in the bases.  No wheezes.

CARDIOVASCULAR:  Regular rate and rhythm with S1, S2, no S3.

ABDOMEN:  Soft.

EXTREMITIES:  No clubbing or cyanosis.  Minimal edema.



LABORATORY DATA:  White count was 9.4, hemoglobin and hematocrit were noted.  

BUN and creatinine were noted.  Creatinine was slightly elevated. Total protein 

was 6.7, albumin was 1.1.



IMPRESSION:

1.  Acute hypoxemic respiratory failure, multifactorial.

2.  Possible bacterial pneumonia.

3.  Bilateral pulmonary infiltrates on chest x-ray, compatible with possible 

pulmonary edema, left-sided effusion.

4.  Multifactorial encephalopathy.

5.  Recent open reduction and internal fixation for hip fracture.

6.  SARS-CoV-2 was negative.

7.  Other comorbidities including type 2 diabetes, neuropathy, dementia, major 

depressive disorder, allergic rhinitis, and atrial fibrillation.



PLAN:

1.  Continue antibiotics per ID.

2.  Obtain CT chest, the patient may benefit from thoracentesis.

3.  Continue home meds.

4.  I will review CT and make further recommendations.



Dr. Vega, I do appreciate the privilege in sharing in the patient's care.







JAJA/ALETA

DR: Sandra   DD: 10/09/2021 12:52

DT: 10/09/2021 13:08   TID: [id number]

## 2021-10-09 NOTE — HP
ADMIT DATE: 10/09/2021

HISTORY OF PRESENT ILLNESS:  The patient is an 86-year-old  female 

patient, resident at [facility], who was sent to the Emergency 

Room of [facility] with altered mental status.  The patient apparently 

was unresponsive even to sternal rub.  She apparently normally alert, but 

disoriented due to dementia.  She has had open reduction internal fixation of a 

hip fracture, complicated pneumonia.  She was started on Zyprexa.  She has been 

sundowning.  She was extensively investigated in the Emergency Room of [facility] and had had an EKG, which showed that her rhythm was irregularly

irregular consistent with atrial fibrillation at 113 beats per minute with no ST

segment elevation or depression.  Her chest x-ray showed moderate left and trace

right pleural effusion and diffuse lower lobe predominant interstitial 

infiltrate, increased compared to the prior study.  She had had a CT scan of the

head, which showed there is no intracranial hemorrhage, edema or mass effect.  

The brain parenchyma demonstrates periventricular and deep white matter 

hypodensities compatible with chronic microvascular ischemic changes.  The size 

of the ventricles is appropriate.  She has mucosal thickening similar to the 

previous exam is seen in the maxillary sinuses.  Other paranasal sinuses are 

clear.  She apparently was hypoxic on arrival to the Emergency Room and 

therefore she was started on oxygen supplementation, was treated with aztreonam 

and Flagyl and was transferred to [facility] for further 

evaluation and treatment.



PAST MEDICAL HISTORY:  Significant for pure hypercholesterolemia, 

hypercoagulability state, essential hypertension, paroxysmal atrial 

fibrillation/wandering atrial pacemaker, thoracic aortic aneurysm without 

rupture, chronic obstructive pulmonary disease, chronic kidney disease as well 

as chronic diastolic congestive heart failure.



PAST SURGICAL HISTORY:  Significant for bilateral cataract extraction, total 

abdominal hysterectomy and most recently she has hip fracture for which she 

underwent open reduction internal fixation.



ALLERGIES:  SHE IS ALLERGIC TO PENICILLIN AND IBUPROFEN.



FAMILY HISTORY:  Significant for the fact that the father has heart disease and 

 at the age of 72.  Mother had cerebrovascular accident and  at age of 

77.  She has a brother with heart disease and alcohol abuse.



SOCIAL HISTORY:  She is  and lives with her .  She has 2 sons from

previous marriage and 1 daughter.  She never smoked, does not drink alcohol or 

do any recreational drugs.  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



REVIEW OF SYSTEMS:  As per history of present illness.



PHYSICAL EXAMINATION:

GENERAL:  On arrival to the Emergency Room, the patient looked pale, but not 

jaundiced or cyanosed.  No lymphadenopathy, no thyromegaly, no jugular venous 

distention.  No lower limb edema.

VITAL SIGNS:  Her heart rate was 117, blood pressure was 109/53, temperature was

98.6, respiratory rate was 16 and oxygen saturation was 98% on 3 liters of 

oxygen.

HEAD, EYES, EARS, NOSE AND THROAT:  Normocephalic, atraumatic.

NECK:  Supple.

HEART:  Showed normal first and second heart sounds.  No gallop or murmur.

CHEST:  Shows central trachea, equally reduced expansion air entry, vesicular 

breath sounds.  No crepitation or rhonchi.  She has dull percussion noted and 

absent breath sounds both sides posteriorly.

ABDOMEN:  Distended, soft, nontender.

NEUROLOGIC:  She was alert, moves all her extremities.

PSYCHOLOGIC:  Her affect and mood was normal.



LABORATORY DATA:  While in the Emergency Room, she has had lab work done showed 

white cell count of 9200, hemoglobin 8.8, hematocrit 27.7, MCV 86 and platelet 

count 236,000 with normal manual differential.  Her chemistry showed a serum 

sodium 139, potassium 3.9, chloride 104, bicarbonate 30, anion gap of 5, BUN 25,

creatinine 1.1.  Estimated GFR was 47 mL per minute.  Her glucose 109, calcium 

was 8.7, magnesium 2.  Lactic acid was 1.1.  Her total bilirubin, AST, ALT, 

alkaline phosphatase were normal.  Her BNP was 9686.  Total protein 6.8, albumin

2.4 and lipase was 93.  Her arterial blood gas showed a pH of 7.37, pCO2 of 52, 

pO2 of 65, bicarbonate 30 and oxygen saturation was 96% on FiO2 of 32%.  Her 

urinalysis essentially unremarkable and tox screen was negative.  Her 

coronavirus by PCR and rapid testing both were negative.  Her chest x-ray showed

moderate left and trace right pleural effusion and diffuse lower lobe 

predominant interstitial infiltrate, increased compared to the prior study.  CT 

scan of the head showed there is no intracranial hemorrhage, edema or mass 

effect; the brain parenchyma demonstrates periventricular and deep white matter 

hypodensities compatible with chronic microvascular ischemic changes; size of 

the ventricles appropriate; mucosal thickening, similar to previous exam seen in

the maxillary sinuses; other paranasal sinuses are clear.



ASSESSMENT:  The patient was transferred to [facility] with:

1.  Acute hypoxic respiratory failure.

2.  Probably healthcare-associated pneumonia as the patient is known to have 

dysphagia and has had aspiration pneumonia before.

3.  Acute on chronic diastolic congestive heart failure.

4.  Atrial fibrillation/multifocal atrial tachycardia, for which she is on 

diltiazem.



PLAN:  My plan is to continue with all her medications.  I have held her 

hydrocodone, lorazepam.  Continue with apixaban for her right lower extremity 

DVT.  I have consulted the infectious disease specialist, cardiologist and the 

pulmonologist.  We have had discussion with the family regarding hospice care as

she has recurrent aspiration pneumonia, but the family want to pursue aggressive

treatment.







AMM/ILTA

DR: AMM/nts   DD: 10/09/2021 09:33

DT: 10/09/2021 09:57   TID: [id number]

## 2021-10-09 NOTE — CONS
DATE OF CONSULTATION: 10/09/2021

REFERRING PHYSICIAN:  Kimberly Vega MD.



REASON FOR CONSULTATION:  HCAP antibiotic management.



HISTORY OF PRESENT ILLNESS:  An 86-year-old female, nursing home resident with 

history of type 2 diabetes, dementia, neuropathy, major depressive disorder, 

obesity, DVT embolization, glaucoma, allergic rhinitis, osteoarthritis, pressure

ulcer, atrial fibrillation, COPD, intermittent asthma, CKD, history of 

pneumonia, history of diastolic chronic heart failure, dysphagia oropharyngeal, 

was sent to [facility] on 10/08/2021 with altered mental status.  There was 

also concern that her altered mental status was getting worse at [place].  The

patient is not able to give history.  She is arousable, but does not give any 

information.  The patient is normally alert, was found to be disoriented due to 

her dementia.  She was recently admitted for ORIF for hip fracture complicated 

by pneumonia.  From ED notes, it appears that she was started on Zyprexa 

recently.  The patient's white count was normal at 9.2, creatinine of 1.1.  UA 

was negative.  BNP was 9686.  CK of 12.  Troponin was 0.018.  Lactate of 1.1.  

SARS COVID was negative.  The patient was found to have infiltrate with pleural 

effusion.  The patient was started on Levaquin and linezolid.  ID consultation 

has been requested for antibiotic management.  The patient remains afebrile 

here.  Labs from here are pending.  At this time, requiring 4 liters O2 by nasal

cannula.



PAST MEDICAL HISTORY:  Diabetes, asthma, COPD, right ankle pressure ulcer, 

paroxysmal atrial fibrillation, CKD, dementia, nursing home resident.



REVIEW OF SYSTEMS:  Negative to obtain.



ALLERGIES:  PENICILLIN, UNKNOWN REACTION PER PHARMACY.  The patient has 

tolerated Keflex in the past.  IBUPROFEN.



SOCIAL HISTORY:  Nonsmoker, nursing home resident.



CURRENT MEDICATIONS:  Levaquin, linezolid.  Other medications reviewed in 

medication list.



PHYSICAL EXAMINATION:

VITAL SIGNS:  Temperature 97.9, pulse 115, respiratory rate 20, blood pressure 

123/69, oxygen saturation 98% on 4 liters O2 by nasal cannula.

GENERAL:  Sleepy, arousable, lethargic female, appears comfortable on 4 liters 

O2 by nasal cannula.

HEENT:  Normocephalic, atraumatic.  Anicteric.  No thrush.  Oral mucosa moist.

NECK:  Supple.

LUNGS:  Decreased breath sounds at the bases.  No accessory muscle use.  No 

wheezing.

HEART:  S1, S2.  No murmurs.

ABDOMEN:  Soft, nontender, nondistended.

EXTREMITIES:  No edema, no cyanosis.

DERMATOLOGIC:  Warm, dry, no generalized rash.

NEUROLOGIC:  Lethargic, sleepy, arousable.

PSYCHIATRIC:  Unable to assess.



LABORATORY DATA:  None here.  Labs at [facility] as per HPI.



DIAGNOSTIC DATA:  Reviewed.



IMPRESSION:

1.  Acute hypoxic respiratory failure, likely aspiration pneumonia.

2.  Underlying chronic obstructive pulmonary disease and mild intermittent 

asthma.

3.  Encephalopathy with underlying dementia.

4.  Chronic kidney disease.

5.  Diabetes mellitus with neuropathy.

6.  Right ankle ulcer.

7.  History of ALLERGIES TO PENICILLIN intermediate, has tolerated Keflex per 

pharmacy.



RECOMMENDATIONS:

1.  Discontinue Levaquin.

2.  Start cefepime, renal dosing, pharmacy to assist, observe closely.

3.  Continue linezolid.

4.  Monitor labs and cultures.

5.  Maintain aspiration precaution.

6.  Continue supportive care.

7.  CT Chest planned



Discussed with nursing staff.



Thank you, Dr. Vega, for consulting Infectious Disease to participate in this 

patient's care.  If you have any questions, do not hesitate to contact me.  We 

will follow along with you.







JONI/RACHEL FREEMAN: Cony   DD: 10/09/2021 08:21

DT: 10/09/2021 09:40   TID: [id number]

[facility]

## 2021-10-10 VITALS — DIASTOLIC BLOOD PRESSURE: 66 MMHG | SYSTOLIC BLOOD PRESSURE: 101 MMHG

## 2021-10-10 VITALS — DIASTOLIC BLOOD PRESSURE: 60 MMHG | SYSTOLIC BLOOD PRESSURE: 104 MMHG

## 2021-10-10 VITALS — DIASTOLIC BLOOD PRESSURE: 57 MMHG | SYSTOLIC BLOOD PRESSURE: 118 MMHG

## 2021-10-10 VITALS — SYSTOLIC BLOOD PRESSURE: 107 MMHG | DIASTOLIC BLOOD PRESSURE: 66 MMHG

## 2021-10-10 VITALS — SYSTOLIC BLOOD PRESSURE: 98 MMHG | DIASTOLIC BLOOD PRESSURE: 39 MMHG

## 2021-10-10 VITALS — DIASTOLIC BLOOD PRESSURE: 46 MMHG | SYSTOLIC BLOOD PRESSURE: 110 MMHG

## 2021-10-10 PROCEDURE — 0W993ZZ DRAINAGE OF RIGHT PLEURAL CAVITY, PERCUTANEOUS APPROACH: ICD-10-PCS

## 2021-10-10 RX ADMIN — CEFEPIME SCH GM: 1 INJECTION, POWDER, FOR SOLUTION INTRAMUSCULAR; INTRAVENOUS at 09:22

## 2021-10-10 RX ADMIN — LINEZOLID SCH MG: 600 TABLET, FILM COATED ORAL at 13:09

## 2021-10-10 RX ADMIN — LATANOPROST SCH DROP: 50 SOLUTION OPHTHALMIC at 13:10

## 2021-10-10 RX ADMIN — FUROSEMIDE SCH MG: 10 INJECTION, SOLUTION INTRAMUSCULAR; INTRAVENOUS at 09:22

## 2021-10-10 RX ADMIN — DORZOLAMIDE HYDROCHLORIDE SCH DROP: 20 SOLUTION/ DROPS OPHTHALMIC at 21:09

## 2021-10-10 RX ADMIN — TIMOLOL MALEATE SCH DROP: 5 SOLUTION/ DROPS OPHTHALMIC at 21:09

## 2021-10-10 RX ADMIN — ATORVASTATIN CALCIUM SCH MG: 10 TABLET, FILM COATED ORAL at 21:10

## 2021-10-10 RX ADMIN — INSULIN LISPRO SCH UNITS: 100 INJECTION, SOLUTION INTRAVENOUS; SUBCUTANEOUS at 17:00

## 2021-10-10 RX ADMIN — DORZOLAMIDE HYDROCHLORIDE SCH DROP: 20 SOLUTION/ DROPS OPHTHALMIC at 13:10

## 2021-10-10 RX ADMIN — MONTELUKAST SODIUM SCH MG: 10 TABLET, FILM COATED ORAL at 21:10

## 2021-10-10 RX ADMIN — CEFEPIME SCH GM: 1 INJECTION, POWDER, FOR SOLUTION INTRAMUSCULAR; INTRAVENOUS at 21:09

## 2021-10-10 RX ADMIN — INSULIN LISPRO SCH UNITS: 100 INJECTION, SOLUTION INTRAVENOUS; SUBCUTANEOUS at 08:00

## 2021-10-10 RX ADMIN — LINEZOLID SCH MG: 600 TABLET, FILM COATED ORAL at 21:10

## 2021-10-10 RX ADMIN — INSULIN LISPRO SCH UNITS: 100 INJECTION, SOLUTION INTRAVENOUS; SUBCUTANEOUS at 12:00

## 2021-10-10 RX ADMIN — IPRATROPIUM BROMIDE AND ALBUTEROL SULFATE SCH ML: .5; 3 SOLUTION RESPIRATORY (INHALATION) at 20:15

## 2021-10-10 RX ADMIN — IPRATROPIUM BROMIDE AND ALBUTEROL SULFATE SCH ML: .5; 3 SOLUTION RESPIRATORY (INHALATION) at 16:05

## 2021-10-10 RX ADMIN — LATANOPROST SCH DROP: 50 SOLUTION OPHTHALMIC at 21:09

## 2021-10-10 RX ADMIN — TIMOLOL MALEATE SCH DROP: 5 SOLUTION/ DROPS OPHTHALMIC at 13:10

## 2021-10-10 RX ADMIN — Medication SCH CAP: at 21:10

## 2021-10-10 RX ADMIN — IPRATROPIUM BROMIDE AND ALBUTEROL SULFATE SCH ML: .5; 3 SOLUTION RESPIRATORY (INHALATION) at 06:17

## 2021-10-10 RX ADMIN — LOSARTAN POTASSIUM SCH MG: 25 TABLET, FILM COATED ORAL at 13:09

## 2021-10-10 RX ADMIN — Medication SCH CAP: at 13:08

## 2021-10-10 RX ADMIN — IPRATROPIUM BROMIDE AND ALBUTEROL SULFATE SCH ML: .5; 3 SOLUTION RESPIRATORY (INHALATION) at 11:50

## 2021-10-10 NOTE — PDOC
PROGRESS NOTES


Date of Service:


DATE: 10/10/21 


TIME: 11:45





Subjective


Subjective


Dyspnea improved





Objective


Objective





Vital Signs








  Date Time  Temp Pulse Resp B/P (MAP) Pulse Ox O2 Delivery O2 Flow Rate FiO2


 


10/10/21 08:00      Nasal Cannula 2.0 


 


10/10/21 07:00 97.9 115 26 107/66 (80) 97   





 97.9       














Intake and Output 


 


 10/10/21





 07:00


 


Intake Total 360 ml


 


Output Total 400 ml


 


Balance -40 ml


 


 


 


Intake Oral 360 ml


 


Output Urine Total 400 ml


 


# Voids 6











Physical Exam


Abdomen:  Soft


Heart:  Other (Heart rate irregular)


Extremities:  No edema


General:  Alert, No acute distress


HEENT:  Atraumatic


Lungs:  Other (Decreased air entry bases)


Neuro:  Normal speech


Psych/Mental Status:  Mood NL





Assessment


Assessment


1.  Acute hypoxic respiratory failure, multifactorial secondary to combination 

of acute on chronic diastolic heart failure and possible pneumonia. CT chest 

showed moderate bilateral pleural effusions.  Pulmonary and ID teams following.


2.  Acute on chronic diastolic heart failure: Better compensated after diuresis.

 2D echo in May 2021 showed LVEF 55 to 60%, grade 2 diastolic dysfunction and 

mild aortic stenosis.  Lexiscan nuclear stress test at that time did not show 

any significant ischemia.  Continue current medical regimen.


3.  Paroxysmal atrial fibrillation, presenting with atrial fibrillation with 

RVR.  Heart rate better controlled presently with diltiazem.  Continue Eliquis 

for stroke prophylaxis.


4.  Hypertension: Controlled


5.  Hyperlipidemia: Continue statins


6.  Diabetes mellitus type 2: Treat per IM





Comment


Review of Relevant


I have reviewed the following items ellie (where applicable) has been applied.


Labs





Laboratory Tests








Test


 10/9/21


15:46 10/9/21


20:49 10/10/21


08:34


 


Glucose (Fingerstick)


 138 mg/dL


(70-99) 234 mg/dL


(70-99) 202 mg/dL


(70-99)








Medications





Current Medications


Lactobacillus Rhamnosus (Culturelle) 1 cap BID PO  Last administered on 

10/9/21at 21:00;  Start 10/9/21 at 21:00


Olanzapine (ZyPREXA) 2.5 mg PRN Q4HRS  PRN PO AGITATION Last administered on 

10/10/21at 00:18;  Start 10/9/21 at 23:00


Vitals/I & O





Vital Sign - Last 24 Hours








 10/9/21 10/9/21 10/9/21 10/9/21





 15:00 15:48 19:00 20:24


 


Temp 98.4  98.6 





 98.4  98.6 


 


Pulse 113  106 


 


Resp 20  22 


 


B/P (MAP) 107/63 (78)  99/54 (69) 


 


Pulse Ox 99 100 94 


 


O2 Delivery Nasal Cannula Nasal Cannula  Nasal Cannula


 


O2 Flow Rate 3.0 4.0  4.0


 


    





    





 10/9/21 10/9/21 10/10/21 10/10/21





 20:27 23:00 03:07 06:16


 


Temp  98.2 98.8 





  98.2 98.8 


 


Pulse  120 114 


 


Resp  24 21 


 


B/P (MAP)  96/63 (74) 118/57 (77) 


 


Pulse Ox 100 93 93 


 


O2 Delivery Nasal Cannula  Nasal Cannula Nasal Cannula


 


O2 Flow Rate 5.0  2.0 3.0


 


    





    





 10/10/21 10/10/21  





 07:00 08:00  


 


Temp 97.9   





 97.9   


 


Pulse 115   


 


Resp 26   


 


B/P (MAP) 107/66 (80)   


 


Pulse Ox 97   


 


O2 Delivery Nasal Cannula Nasal Cannula  


 


O2 Flow Rate 2.0 2.0  














Intake and Output   


 


 10/9/21 10/9/21 10/10/21





 15:00 23:00 07:00


 


Intake Total 120 ml 120 ml 120 ml


 


Output Total  400 ml 


 


Balance 120 ml -280 ml 120 ml

















ADAL MCHUGH MD           Oct 10, 2021 11:45

## 2021-10-10 NOTE — PDOC
Infectious Disease Note


Subjective:


Subjective


Patient more alert today 


pleasantly confused which appears to be baseline


On O2 by nasal cannula





Vital Signs:


Vital Signs





Vital Signs








  Date Time  Temp Pulse Resp B/P (MAP) Pulse Ox O2 Delivery O2 Flow Rate FiO2


 


10/10/21 07:00 97.9 115 26 107/66 (80) 97 Nasal Cannula 2.0 





 97.9       











Physical Exam:


PHYSICAL EXAM


GENERAL: Patient alert awake confused appears comfortable, nontoxic-appearing


HEENT:  Normocephalic, atraumatic.  Anicteric.  No thrush.  Oral mucosa moist.


NECK:  Supple.


LUNGS:  Decreased breath sounds at the bases.  No accessory muscle use.  No 


wheezing.


HEART:  S1, S2.  Irregular no murmurs.


ABDOMEN:  Soft, nontender, nondistended.


EXTREMITIES:  No edema, no cyanosis.


DERMATOLOGIC:  Warm, dry, no generalized rash.


NEUROLOGIC:  Lethargic, sleepy, arousable.


PSYCHIATRIC:  Unable to assess.





Medications:


Inpatient Meds:


Medications reviewed.





Labs:


Lab





Laboratory Tests








Test


 10/9/21


10:57 10/9/21


15:46 10/9/21


20:49 10/10/21


08:34


 


Glucose (Fingerstick)


 141 mg/dL


(70-99) 138 mg/dL


(70-99) 234 mg/dL


(70-99) 202 mg/dL


(70-99)











Objective:


Assessment:


 


1.  Acute hypoxic respiratory failure, likely aspiration pneumonia.


     CT chest noted


2.  Underlying chronic obstructive pulmonary disease and mild intermittent 


asthma.


3.  Encephalopathy with underlying dementia.


4.  Chronic kidney disease.


5.  Diabetes mellitus with neuropathy.


6.  Right ankle ulcer.


7.  CHF, atrial fibrillation 


8  History of ALLERGIES TO PENICILLIN intermediate, has tolerated Keflex per 


pharmacy.





Plan:


Plan of Care


Continue cefepime and linezolid


Monitor labs and cultures


Maintain aspiration precaution


Continue supportive care











KENIA LAMB MD           Oct 10, 2021 08:56

## 2021-10-10 NOTE — PDOC
PULMONARY PROGRESS NOTES


DATE: 10/10/21 


TIME: 05:52


Subjective


Patient less confused awake alert no respiratory distress


Vitals





Vital Signs








  Date Time  Temp Pulse Resp B/P (MAP) Pulse Ox O2 Delivery O2 Flow Rate FiO2


 


10/10/21 03:07 98.8 114 21 118/57 (77) 93 Nasal Cannula 2.0 





 98.8       








Lungs:  Clear, Other (Diminished breath sounds in the base)


Cardiovascular:  S1, S2


Abdomen:  Soft


Neuro Exam:  Alert


Extremities:  No Edema


Skin:  Warm


Labs





Laboratory Tests








Test


 10/9/21


07:41 10/9/21


08:50 10/9/21


10:57 10/9/21


15:46


 


Glucose (Fingerstick)


 83 mg/dL


(70-99) 


 141 mg/dL


(70-99) 138 mg/dL


(70-99)


 


White Blood Count


 


 9.4 x10^3/uL


(4.0-11.0) 


 





 


Red Blood Count


 


 3.22 x10^6/uL


(3.50-5.40) 


 





 


Hemoglobin


 


 8.9 g/dL


(12.0-15.5) 


 





 


Hematocrit


 


 27.9 %


(36.0-47.0) 


 





 


Mean Corpuscular Volume  87 fL ()   


 


Mean Corpuscular Hemoglobin  28 pg (25-35)   


 


Mean Corpuscular Hemoglobin


Concent 


 32 g/dL


(31-37) 


 





 


Red Cell Distribution Width


 


 17.4 %


(11.5-14.5) 


 





 


Platelet Count


 


 260 x10^3/uL


(140-400) 


 





 


Neutrophils (%) (Auto)  66 % (31-73)   


 


Lymphocytes (%) (Auto)  16 % (24-48)   


 


Monocytes (%) (Auto)  13 % (0-9)   


 


Eosinophils (%) (Auto)  4 % (0-3)   


 


Basophils (%) (Auto)  1 % (0-3)   


 


Neutrophils # (Auto)


 


 6.2 x10^3/uL


(1.8-7.7) 


 





 


Lymphocytes # (Auto)


 


 1.5 x10^3/uL


(1.0-4.8) 


 





 


Monocytes # (Auto)


 


 1.2 x10^3/uL


(0.0-1.1) 


 





 


Eosinophils # (Auto)


 


 0.4 x10^3/uL


(0.0-0.7) 


 





 


Basophils # (Auto)


 


 0.1 x10^3/uL


(0.0-0.2) 


 





 


Sodium Level


 


 140 mmol/L


(136-145) 


 





 


Potassium Level


 


 4.1 mmol/L


(3.5-5.1) 


 





 


Chloride Level


 


 105 mmol/L


() 


 





 


Carbon Dioxide Level


 


 27 mmol/L


(21-32) 


 





 


Anion Gap  8 (6-14)   


 


Blood Urea Nitrogen


 


 16 mg/dL


(7-20) 


 





 


Creatinine


 


 1.2 mg/dL


(0.6-1.0) 


 





 


Estimated GFR


(Cockcroft-Gault) 


 42.6 


 


 





 


BUN/Creatinine Ratio  13 (6-20)   


 


Glucose Level


 


 78 mg/dL


(70-99) 


 





 


Calcium Level


 


 8.7 mg/dL


(8.5-10.1) 


 





 


Total Bilirubin


 


 0.3 mg/dL


(0.2-1.0) 


 





 


Aspartate Amino Transf


(AST/SGOT) 


 16 U/L (15-37) 


 


 





 


Alanine Aminotransferase


(ALT/SGPT) 


 13 U/L (14-59) 


 


 





 


Alkaline Phosphatase


 


 85 U/L


() 


 





 


Total Protein


 


 6.7 g/dL


(6.4-8.2) 


 





 


Albumin


 


 2.1 g/dL


(3.4-5.0) 


 





 


Albumin/Globulin Ratio  0.5 (1.0-1.7)   


 


Test


 10/9/21


20:49 


 


 





 


Glucose (Fingerstick)


 234 mg/dL


(70-99) 


 


 











Laboratory Tests








Test


 10/9/21


07:41 10/9/21


08:50 10/9/21


10:57 10/9/21


15:46


 


Glucose (Fingerstick)


 83 mg/dL


(70-99) 


 141 mg/dL


(70-99) 138 mg/dL


(70-99)


 


White Blood Count


 


 9.4 x10^3/uL


(4.0-11.0) 


 





 


Red Blood Count


 


 3.22 x10^6/uL


(3.50-5.40) 


 





 


Hemoglobin


 


 8.9 g/dL


(12.0-15.5) 


 





 


Hematocrit


 


 27.9 %


(36.0-47.0) 


 





 


Mean Corpuscular Volume  87 fL ()   


 


Mean Corpuscular Hemoglobin  28 pg (25-35)   


 


Mean Corpuscular Hemoglobin


Concent 


 32 g/dL


(31-37) 


 





 


Red Cell Distribution Width


 


 17.4 %


(11.5-14.5) 


 





 


Platelet Count


 


 260 x10^3/uL


(140-400) 


 





 


Neutrophils (%) (Auto)  66 % (31-73)   


 


Lymphocytes (%) (Auto)  16 % (24-48)   


 


Monocytes (%) (Auto)  13 % (0-9)   


 


Eosinophils (%) (Auto)  4 % (0-3)   


 


Basophils (%) (Auto)  1 % (0-3)   


 


Neutrophils # (Auto)


 


 6.2 x10^3/uL


(1.8-7.7) 


 





 


Lymphocytes # (Auto)


 


 1.5 x10^3/uL


(1.0-4.8) 


 





 


Monocytes # (Auto)


 


 1.2 x10^3/uL


(0.0-1.1) 


 





 


Eosinophils # (Auto)


 


 0.4 x10^3/uL


(0.0-0.7) 


 





 


Basophils # (Auto)


 


 0.1 x10^3/uL


(0.0-0.2) 


 





 


Sodium Level


 


 140 mmol/L


(136-145) 


 





 


Potassium Level


 


 4.1 mmol/L


(3.5-5.1) 


 





 


Chloride Level


 


 105 mmol/L


() 


 





 


Carbon Dioxide Level


 


 27 mmol/L


(21-32) 


 





 


Anion Gap  8 (6-14)   


 


Blood Urea Nitrogen


 


 16 mg/dL


(7-20) 


 





 


Creatinine


 


 1.2 mg/dL


(0.6-1.0) 


 





 


Estimated GFR


(Cockcroft-Gault) 


 42.6 


 


 





 


BUN/Creatinine Ratio  13 (6-20)   


 


Glucose Level


 


 78 mg/dL


(70-99) 


 





 


Calcium Level


 


 8.7 mg/dL


(8.5-10.1) 


 





 


Total Bilirubin


 


 0.3 mg/dL


(0.2-1.0) 


 





 


Aspartate Amino Transf


(AST/SGOT) 


 16 U/L (15-37) 


 


 





 


Alanine Aminotransferase


(ALT/SGPT) 


 13 U/L (14-59) 


 


 





 


Alkaline Phosphatase


 


 85 U/L


() 


 





 


Total Protein


 


 6.7 g/dL


(6.4-8.2) 


 





 


Albumin


 


 2.1 g/dL


(3.4-5.0) 


 





 


Albumin/Globulin Ratio  0.5 (1.0-1.7)   


 


Test


 10/9/21


20:49 


 


 





 


Glucose (Fingerstick)


 234 mg/dL


(70-99) 


 


 











Medications





Active Scripts








 Medications  Dose


 Route/Sig


 Max Daily Dose Days Date Category


 


 Ativan


  (Lorazepam) 1 Mg


 Tablet  1 Mg


 PO PRN BID PRN


  7 9/13/21 Rx


 


 Hydrocodone-Apap


 5-325  **


  (Hydrocodone


 Bit/Acetaminophen)


 1 Tab Tablet  1 Tab


 PO PRN Q6-8HRS PRN


  7 9/13/21 Rx


 


 Diltiazem 24Hr ER


  (LA) (Diltiazem


 HCl) 180 Mg


 Tab.er.24h  1 Tab


 PO DAILY


  30 9/10/21 Reported


 


 Xalatan


  (Latanoprost) 2.5


 Ml Drops  1 Drop


 OU BID


   9/8/21 Reported


 


 Triamterene-Hctz


 37.5-25 Mg Cp


  (Triamterene/Hydrochlorothiazid)


 1 Each Capsule  1 Cap


 PO DAILY


   9/8/21 Reported


 


 Ventolin Hfa


 Inhaler


  (Albuterol


 Sulfate) 18 Gm


 Hfa.aer.ad  2 Puff


 INH PRN Q4HRS PRN


   9/8/21 Reported


 


 Singulair Tablet


 ** (Montelukast


 Sodium) 10 Mg


 Tablet  10 Mg


 PO HS


   9/8/21 Reported


 


 Duoneb 0.5-3(2.5)


 Mg/3 Ml


  (Albuterol/Ipratropium)


 3 Ml Ampul.neb  3 Ml


 NEB QID


   9/8/21 Reported


 


 Lantus Solostar


  (Insulin


 Glargine,Hum.rec.anlog)


 100 Unit/1 Ml


 Insuln.pen  30 Unit


 SQ QHS


   9/8/21 Reported


 


 Eliquis


  (Apixaban) 5 Mg


 Tablet  5 Mg


 PO BID


   9/8/21 Reported


 


 Cozaar **


  (Losartan


 Potassium) 25 Mg


 Tablet  25 Mg


 PO DAILY


   9/8/21 Reported


 


 Cosopt Eye Drops


  (Dorzolamide


 Hcl/Timolol


 Maleat) 10 Ml


 Drops  1 Drop


 EACHEYE BID


   9/8/21 Reported


 


 Atorvastatin


 Calcium 10 Mg


 Tablet  10 Mg


 PO HS


   9/8/21 Reported


 


 Admelog (Insulin


 Lispro) 100


 Unit/1 Ml Vial  8 Unit


 SQ TIDAC


   9/8/21 Reported


 


 Admelog (Insulin


 Lispro) 100


 Unit/1 Ml Vial  0-12 Unit


 SQ TIDWMEALS


   9/8/21 Reported


 


 Acetaminophen 325


 Mg Tablet  2 Tab


 PO PRN Q6HRS PRN


  30 9/8/21 Reported











Impression


.


IMPRESSION:


1.  Acute hypoxemic respiratory failure, multifactorial.


2.  Possible bacterial pneumonia.


3.  Bilateral pulmonary infiltrates on chest x-ray, compatible with possible 


pulmonary edema, left-sided effusion.


4.  Multifactorial encephalopathy.


5.  Recent open reduction and internal fixation for hip fracture.


6.  SARS-CoV-2 was negative.


7.  Other comorbidities including type 2 diabetes, neuropathy, dementia, major 


depressive disorder, allergic rhinitis, and atrial fibrillation.








CT chest reviewed


Impression:


1.  Moderate bilateral pleural effusions with adjacent atelectasis or 

consolidations.


2.  Mild right upper lobe groundglass opacities, may represent infectious or 

inflammatory process.


3.  Mildly prominent mediastinal lymph nodes, likely reactive.


4.  Increased bilateral adrenal gland nodular thickening. Recommend follow-up 

adrenal protocol CT or MRI.





Plan


.


Schedule thoracentesis in the a.m. with interventional radiologist, need to 

discuss with family


Continue current support


Empiric antibiotics


Diurese














PLAN:


1.  Continue antibiotics per ID.


2.  Obtain CT chest, the patient may benefit from thoracentesis.


3.  Continue home meds.


4.  I will review CT and make further recommendations.





Dr. Vega, I do appreciate the privilege in sharing in the patient's care.











KADE PARSONS MD              Oct 10, 2021 05:54

## 2021-10-10 NOTE — PN
DATE: 10/10/2021

SUBJECTIVE:  The patient is resting, slightly propped up in bed, sleeping 

comfortably.  According to nursing staff, she has not slept the whole night, 

yelling, restless, agitated despite getting olanzapine twice.



PHYSICAL EXAMINATION:

GENERAL:  When I examined her, she was pale, not jaundiced or cyanosed. No 

lymphadenopathy. No thyromegaly. No jugular venous distention.  No limb edema.

VITAL SIGNS:  Her heart rate was 115, blood pressure was 107/66, temperature 

97.9, respiratory rate was 26 and oxygen saturation was 97% on 2 liters of 

oxygen.

HEAD, EYES, EARS, NOSE, AND THROAT:  Showed she is normocephalic, atraumatic.

NECK:  Supple.

HEART:  Showed normal first and second heart sounds, no gallop, rub or murmur.

CHEST:  Clear to auscultation, no crepitation or rhonchi.

ABDOMEN:  Distended, soft, nontender.

NEUROLOGIC:  She was demented, but without any obvious lateralizing sign.



Her intake and output are incompletely recorded.



LABORATORY DATA:  Today's labs are still pending at the time of this dictation.



ASSESSMENT:

1.  Acute on chronic hypoxic respiratory failure.  She is now on 4 liters of 

oxygen by nasal cannula.

2.  Acute on chronic diastolic congestive heart failure.

3.  Chronic obstructive pulmonary disease.

4.  Aspiration pneumonia.

5.  Atrial fibrillation versus multifocal atrial tachycardia for which she is on

diltiazem.

6.  She has right lower extremity deep vein thrombosis for which she is on 

apixaban.

7.  Type 2 diabetes mellitus.

8.  Hypertension.

9.  Hypercholesterolemia.

10.  Chronic kidney disease.



PLAN:  To continue with IV antibiotic as recommended by Infectious Disease 

specialist.  Continue with IV Lasix.  Continue with bronchodilators and 

montelukast.  She apparently was seen by the pulmonologist and recommended chest

CT as well as thoracentesis by the interventional radiologist.







AMM/ANNITA/UBALDO

DR: AMM/nts   DD: 10/10/2021 08:34

DT: 10/10/2021 08:49   TID: [id number]

## 2021-10-11 VITALS — DIASTOLIC BLOOD PRESSURE: 53 MMHG | SYSTOLIC BLOOD PRESSURE: 100 MMHG

## 2021-10-11 VITALS — SYSTOLIC BLOOD PRESSURE: 109 MMHG | DIASTOLIC BLOOD PRESSURE: 89 MMHG

## 2021-10-11 VITALS — DIASTOLIC BLOOD PRESSURE: 59 MMHG | SYSTOLIC BLOOD PRESSURE: 119 MMHG

## 2021-10-11 VITALS — SYSTOLIC BLOOD PRESSURE: 99 MMHG | DIASTOLIC BLOOD PRESSURE: 51 MMHG

## 2021-10-11 VITALS — DIASTOLIC BLOOD PRESSURE: 51 MMHG | SYSTOLIC BLOOD PRESSURE: 98 MMHG

## 2021-10-11 VITALS — DIASTOLIC BLOOD PRESSURE: 57 MMHG | SYSTOLIC BLOOD PRESSURE: 105 MMHG

## 2021-10-11 VITALS — SYSTOLIC BLOOD PRESSURE: 113 MMHG | DIASTOLIC BLOOD PRESSURE: 74 MMHG

## 2021-10-11 VITALS — DIASTOLIC BLOOD PRESSURE: 54 MMHG | SYSTOLIC BLOOD PRESSURE: 96 MMHG

## 2021-10-11 VITALS — DIASTOLIC BLOOD PRESSURE: 50 MMHG | SYSTOLIC BLOOD PRESSURE: 107 MMHG

## 2021-10-11 LAB
ANION GAP SERPL CALC-SCNC: 6 MMOL/L (ref 6–14)
BASOPHILS # BLD AUTO: 0 X10^3/UL (ref 0–0.2)
BASOPHILS NFR BLD: 1 % (ref 0–3)
BF MON %: 76 %
BF OTHER %: 1 %
BF PMN %: 23 %
BF RBC COUNT: 1011 /CMM
BF SOURCE: (no result)
BF WBC COUNT: 578 /CMM
BUN SERPL-MCNC: 22 MG/DL (ref 7–20)
CALCIUM SERPL-MCNC: 8.8 MG/DL (ref 8.5–10.1)
CHLORIDE SERPL-SCNC: 104 MMOL/L (ref 98–107)
CLARITY SPEC: CLEAR
CO2 SERPL-SCNC: 34 MMOL/L (ref 21–32)
COLOR FLD: YELLOW
CREAT SERPL-MCNC: 1.6 MG/DL (ref 0.6–1)
EOSINOPHIL NFR BLD: 0.3 X10^3/UL (ref 0–0.7)
EOSINOPHIL NFR BLD: 4 % (ref 0–3)
ERYTHROCYTE [DISTWIDTH] IN BLOOD BY AUTOMATED COUNT: 18.5 % (ref 11.5–14.5)
GFR SERPLBLD BASED ON 1.73 SQ M-ARVRAT: 30.6 ML/MIN
GLUCOSE SERPL-MCNC: 191 MG/DL (ref 70–99)
HCT VFR BLD CALC: 29.4 % (ref 36–47)
HGB BLD-MCNC: 9.3 G/DL (ref 12–15.5)
LYMPHOCYTES # BLD: 1.3 X10^3/UL (ref 1–4.8)
LYMPHOCYTES NFR BLD AUTO: 16 % (ref 24–48)
MCH RBC QN AUTO: 28 PG (ref 25–35)
MCHC RBC AUTO-ENTMCNC: 32 G/DL (ref 31–37)
MCV RBC AUTO: 87 FL (ref 79–100)
MONO #: 1.1 X10^3/UL (ref 0–1.1)
MONOCYTES NFR BLD: 14 % (ref 0–9)
NEUT #: 5.5 X10^3/UL (ref 1.8–7.7)
NEUTROPHILS NFR BLD AUTO: 66 % (ref 31–73)
PLATELET # BLD AUTO: 300 X10^3/UL (ref 140–400)
POTASSIUM SERPL-SCNC: 4.3 MMOL/L (ref 3.5–5.1)
PROTHROMBIN TIME: 17.9 SEC (ref 11.7–14)
RBC # BLD AUTO: 3.39 X10^6/UL (ref 3.5–5.4)
SODIUM SERPL-SCNC: 144 MMOL/L (ref 136–145)
WBC # BLD AUTO: 8.3 X10^3/UL (ref 4–11)

## 2021-10-11 RX ADMIN — ATORVASTATIN CALCIUM SCH MG: 10 TABLET, FILM COATED ORAL at 19:56

## 2021-10-11 RX ADMIN — Medication SCH CAP: at 19:56

## 2021-10-11 RX ADMIN — Medication SCH CAP: at 10:58

## 2021-10-11 RX ADMIN — TIMOLOL MALEATE SCH DROP: 5 SOLUTION/ DROPS OPHTHALMIC at 19:57

## 2021-10-11 RX ADMIN — LATANOPROST SCH DROP: 50 SOLUTION OPHTHALMIC at 11:13

## 2021-10-11 RX ADMIN — CEFEPIME SCH GM: 1 INJECTION, POWDER, FOR SOLUTION INTRAMUSCULAR; INTRAVENOUS at 11:13

## 2021-10-11 RX ADMIN — LINEZOLID SCH MG: 600 TABLET, FILM COATED ORAL at 19:56

## 2021-10-11 RX ADMIN — IPRATROPIUM BROMIDE AND ALBUTEROL SULFATE SCH ML: .5; 3 SOLUTION RESPIRATORY (INHALATION) at 12:39

## 2021-10-11 RX ADMIN — DORZOLAMIDE HYDROCHLORIDE SCH DROP: 20 SOLUTION/ DROPS OPHTHALMIC at 19:57

## 2021-10-11 RX ADMIN — TIMOLOL MALEATE SCH DROP: 5 SOLUTION/ DROPS OPHTHALMIC at 11:13

## 2021-10-11 RX ADMIN — FUROSEMIDE SCH MG: 10 INJECTION, SOLUTION INTRAMUSCULAR; INTRAVENOUS at 11:16

## 2021-10-11 RX ADMIN — DORZOLAMIDE HYDROCHLORIDE SCH DROP: 20 SOLUTION/ DROPS OPHTHALMIC at 11:12

## 2021-10-11 RX ADMIN — INSULIN LISPRO SCH UNITS: 100 INJECTION, SOLUTION INTRAVENOUS; SUBCUTANEOUS at 12:00

## 2021-10-11 RX ADMIN — LOSARTAN POTASSIUM SCH MG: 25 TABLET, FILM COATED ORAL at 09:00

## 2021-10-11 RX ADMIN — IPRATROPIUM BROMIDE AND ALBUTEROL SULFATE SCH ML: .5; 3 SOLUTION RESPIRATORY (INHALATION) at 07:48

## 2021-10-11 RX ADMIN — INSULIN LISPRO SCH UNITS: 100 INJECTION, SOLUTION INTRAVENOUS; SUBCUTANEOUS at 08:00

## 2021-10-11 RX ADMIN — LINEZOLID SCH MG: 600 TABLET, FILM COATED ORAL at 10:58

## 2021-10-11 RX ADMIN — LATANOPROST SCH DROP: 50 SOLUTION OPHTHALMIC at 19:57

## 2021-10-11 RX ADMIN — INSULIN LISPRO SCH UNITS: 100 INJECTION, SOLUTION INTRAVENOUS; SUBCUTANEOUS at 17:00

## 2021-10-11 RX ADMIN — IPRATROPIUM BROMIDE AND ALBUTEROL SULFATE SCH ML: .5; 3 SOLUTION RESPIRATORY (INHALATION) at 16:05

## 2021-10-11 RX ADMIN — IPRATROPIUM BROMIDE AND ALBUTEROL SULFATE SCH ML: .5; 3 SOLUTION RESPIRATORY (INHALATION) at 21:00

## 2021-10-11 RX ADMIN — MONTELUKAST SODIUM SCH MG: 10 TABLET, FILM COATED ORAL at 19:56

## 2021-10-11 NOTE — PN
DATE: 10/11/2021

SUBJECTIVE:  The patient is resting, slightly propped up in bed, no apparent 

distress, awake, alert, but confused.  Denied any complaint.  The nursing staff 

did not voice any concerns.  She is scheduled for thoracentesis.



PHYSICAL EXAMINATION:

GENERAL:   When I examined her, she was pale, but no jaundice, cyanosis or 

thyromegaly.  No jugular venous distention.  No limb edema.

VITAL SIGNS:  Her heart rate was 105, blood pressure is 113/74, temperature 

97.7, respiratory rate was 16 and oxygen saturation was 95%.



PHYSICAL EXAMINATION:

HEAD, EYES, EARS, NOSE, AND THROAT:  Normocephalic, atraumatic.

NECK:  Supple.

HEART:  Showed normal first and second heart sounds. No gallop, rub or murmur.

CHEST:  Showed central trachea, equally reduced expansion, reduced air entry, 

vesicular breath sounds, very few scattered rhonchi.  She has dull percussion 

noted and absent breath sounds both sides posteriorly.

ABDOMEN:  Distended, soft, nontender.

NEUROLOGIC:  She is demented, but without any obvious lateralizing sign.



LABORATORY DATA:  Her lab work this morning is still pending at the time of this

dictation.  Her intake and output were incompletely recorded.



ASSESSMENT:

1.  Acute hypoxic respiratory failure, multifactorial.

2.  Acute on chronic diastolic congestive heart failure.

3.  Paroxysmal atrial fibrillation.

4.  Probably aspiration pneumonia.

5.  Dysphagia.

6.  Hypertension.

7.  Hyperlipidemia.

8.  Diabetes mellitus.

9.  Dementia with sundowning.



PLAN:  To await for the results of the thoracentesis and decide the further 

management accordingly.







SUSANNA

DR: AMM/nts   DD: 10/11/2021 09:12

DT: 10/11/2021 09:51   TID: [id number]

## 2021-10-11 NOTE — RAD
AP chest.



HISTORY: Postthoracentesis, pleural effusions



AP view was taken of the chest. There is a moderate left pleural effusion. There is been an improveme
nt in the right pleural effusion compared to the prior image. There is hazy infiltrates or edema and 
the lungs. There is no pneumothorax.



IMPRESSION:

1. Improved right pleural effusion.

2. Persistent left pleural effusion.

3. Hazy infiltrates or pulmonary edema.



Electronically signed by: Taj Garcia MD (10/11/2021 3:00 PM) [facility]

## 2021-10-11 NOTE — NUR
SW following. Discussed with RN. SW verified pt is from [facility]. Pt getting a 
thoracentesis today. SW notified RN that pt will need a COVID test prior to return to 
facility. SW will continue to follow.

## 2021-10-11 NOTE — RAD
Ultrasound Guided Thoracentesis

 

Indication: Adult female with bilateral pleural effusions, right greater than left.



Consent: The procedure was explained in its entirety to the patient or the patients designated repres
entative by a member of the treatment team, including a discussion of the risks, benefits and commonl
y accepted alternatives to the procedure, as well as the expected consequences of not performing the 
procedure.  Discussion of the risks included, but was not limited to, those that are most frequent an
d those that are rare but possibly severe or life-threatening, as well as the possibility of unforese
en complications.



Sterility: The procedure was performed in its entirety using appropriate elements of sterile techniqu
e.



Technique and Findings: Following informed consent, the patient was prepped and draped in the usual s
terile fashion.  Ultrasound interrogation of the area of interest revealed a pleural effusion.  Under
 ultrasound guidance, a 6 Sami Nrj-O-Lwyxqoal catheter was inserted into the pleural space and 650 
cc of thin yellow fluid was removed.  The catheter was removed and hemostasis was achieved with manua
l compression.  Chest x-ray was ordered.



Complications: No immediate

 

Impression:



1.  Ultrasound-guided right thoracentesis as described.



Electronically signed by: Jacob Tinajero MD (10/11/2021 3:06 PM) [id number]

## 2021-10-11 NOTE — PDOC
NELLY ACUÑA APRHAILEY 10/11/21 0939:


CARDIO Progress Notes


Date and Time


Date of Service


10/11/21


Time of Evaluation


0930





Subjective


Subjective:  No Chest Pain, No shortness of breath, No Palpitations





Vitals


Vitals





Vital Signs








  Date Time  Temp Pulse Resp B/P (MAP) Pulse Ox O2 Delivery O2 Flow Rate FiO2


 


10/11/21 07:48     95 Nasal Cannula 2.0 


 


10/11/21 07:00 97.7 105 16 113/74 (87)    





 97.7       








Weight


Weight [ ]





Input and Output


Intake and Output











Intake and Output 


 


 10/11/21





 07:00


 


Intake Total 600 ml


 


Balance 600 ml


 


 


 


Intake Oral 600 ml


 


# Voids 5


 


# Bowel Movements 2











Laboratory


Labs





Laboratory Tests








Test


 10/10/21


12:26 10/10/21


17:39 10/10/21


21:18 10/11/21


07:26


 


Glucose (Fingerstick)


 157 mg/dL


(70-99) 194 mg/dL


(70-99) 173 mg/dL


(70-99) 170 mg/dL


(70-99)











Physical Exam


HEENT:  Neck Supple W Full Motion


Chest:  Symmetric


Heart:  irregularly irregular


Abdomen:  Soft N/T


Extremities:  No Edema


Neurology:  alert, follow commands, confused





Assessment


Assessment


1.  Acute respiratory failure with CHF and probable aspiration PNA. CT chest 

with moderate bilateral pleural effusions.


2.  Acute on chronic diastolic CHF; Recent echo with preserved LV systolic 

function and mild aortic stenosis. MPI 5/21 without significant ischemia. 

improved s/p IV diuresis


3.  PAFIB with intermittent RVR.  On Card[facility] for rate control[facility] event monitor with SR PAT. No evidence of AFIB


4.  Hypertension; low end. 


5.  Hyperlipidmia; statin 


6.  Diabetes, II


7.  OSBALDO on CKD


8.  Dementia 


9.  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








Recommendations





Unable to uptitrate Cardizem due to low end blood pressure


Will give dose of IV Lenora x1 now. Poor candidate for routine Dig due to low CrCl


Discontinue losartan


Consider using metoprolol rather than Cardizem for rate control if BP remains 

low


Will hold additional IV diuresis as Cr is trending ^


Ongoing antibiotic therapy as per ID.





Justicifation of Admission Dx:


Justifications for Admission:


Justification of Admission Dx:  Yes





ADAL MCHUGH MD 10/11/21 2056:


CARDIO Progress Notes


Assessment


Assessment


Patient seen and examined.  Agree with NP's assessment and plan.


PAF, rate controlled


Ac on chr diast HF better compensated


Pulm planning thoracentesis today











NELLY ACUÑA           Oct 11, 2021 09:39


ADAL MCHUGH MD           Oct 11, 2021 20:56

## 2021-10-11 NOTE — PDOC
Infectious Disease Note


Subjective


Subjective


Patient more alert today 


pleasantly confused which appears to be baseline


On O2 by nasal cannula





ROS


ROS


no n/v/d/





Vital Sign


Vital Signs





Vital Signs








  Date Time  Temp Pulse Resp B/P (MAP) Pulse Ox O2 Delivery O2 Flow Rate FiO2


 


10/11/21 07:48     95 Nasal Cannula 2.0 


 


10/11/21 03:00 98.3 103 18 119/59 (79)    





 98.3       











Physical Exam


PHYSICAL EXAM


GENERAL: Patient alert awake confused appears comfortable, nontoxic-appearing


HEENT:  Normocephalic, atraumatic.  Anicteric.  No thrush.  Oral mucosa moist.


NECK:  Supple.


LUNGS:  Decreased breath sounds at the bases.  No accessory muscle use.  No 


wheezing.


HEART:  S1, S2.  Irregular no murmurs.


ABDOMEN:  Soft, nontender, nondistended.


EXTREMITIES:  No edema, no cyanosis.


DERMATOLOGIC:  Warm, dry, no generalized rash.


NEUROLOGIC:  Lethargic, sleepy, arousable.


PSYCHIATRIC:  Unable to assess.





Labs


Lab





Laboratory Tests








Test


 10/10/21


12:26 10/10/21


17:39 10/10/21


21:18 10/11/21


07:26


 


Glucose (Fingerstick)


 157 mg/dL


(70-99) 194 mg/dL


(70-99) 173 mg/dL


(70-99) 170 mg/dL


(70-99)











Objective


Assessment


1.  Acute hypoxic respiratory failure, likely aspiration pneumonia.


     CT chest noted


2.  Underlying chronic obstructive pulmonary disease and mild intermittent 


asthma.


3.  Encephalopathy with underlying dementia.


4.  Chronic kidney disease.


5.  Diabetes mellitus with neuropathy.


6.  Right ankle ulcer.


7.  CHF, atrial fibrillation 


8  History of ALLERGIES TO PENICILLIN intermediate, has tolerated Keflex per 


pharmacy.





Plan


Plan of Care


Continue cefepime and linezolid


Monitor labs and cultures


Maintain aspiration precaution


Continue supportive care











MIHAELA LAMB MD               Oct 11, 2021 09:09

## 2021-10-11 NOTE — NUR
Wound/Ostomy Care



Wound Type/Assessment:  Patient seen per wound care consult regarding DFUs to the right 
lateral ankle and right heel. Wounds cleansed, assessed, and, measured. Patient is confused 
with minimal conversation. Wounds are reddened with purplish tent with no depth. 





Treatment Recommendations/Plan:  Recommendations for skin prep and foam dressings to both 
wounds. Dressings applied. A foam placed on the coccyx for protection. No other wounds 
noted. 





Education provided: Unable to educate due to mental status. 





Offloading surface/device: We ordered bilateral heel medix for this patient. Patient 
repositioned to left side using wedge. 





Recommended Referrals/Tests: N/A 





Discharge Recommendations for dressings: Dressing change instructions left in room. No other 
wounds noted. Wound care will follow up on 10/19/21 for reassessment. Bed lowered and call 
light in reach.

## 2021-10-11 NOTE — PDOC
PULMONARY PROGRESS NOTES


DATE: 10/11/21 


TIME: 09:29


Subjective


No signs of respiratory distress patient 


Appears to be less cough


Vitals





Vital Signs








  Date Time  Temp Pulse Resp B/P (MAP) Pulse Ox O2 Delivery O2 Flow Rate FiO2


 


10/11/21 07:48     95 Nasal Cannula 2.0 


 


10/11/21 07:00 97.7 105 16 113/74 (87)    





 97.7       








Lungs:  Clear, Other (Diminished breath sounds in the base)


Cardiovascular:  S1, S2


Abdomen:  Soft


Neuro Exam:  Alert


Extremities:  No Edema


Skin:  Warm


Labs





Laboratory Tests








Test


 10/9/21


10:57 10/9/21


15:46 10/9/21


20:49 10/10/21


08:34


 


Glucose (Fingerstick)


 141 mg/dL


(70-99) 138 mg/dL


(70-99) 234 mg/dL


(70-99) 202 mg/dL


(70-99)


 


Test


 10/10/21


12:26 10/10/21


17:39 10/10/21


21:18 10/11/21


07:26


 


Glucose (Fingerstick)


 157 mg/dL


(70-99) 194 mg/dL


(70-99) 173 mg/dL


(70-99) 170 mg/dL


(70-99)








Laboratory Tests








Test


 10/10/21


12:26 10/10/21


17:39 10/10/21


21:18 10/11/21


07:26


 


Glucose (Fingerstick)


 157 mg/dL


(70-99) 194 mg/dL


(70-99) 173 mg/dL


(70-99) 170 mg/dL


(70-99)








Medications





Active Scripts








 Medications  Dose


 Route/Sig


 Max Daily Dose Days Date Category


 


 Ativan


  (Lorazepam) 1 Mg


 Tablet  1 Mg


 PO PRN BID PRN


  7 9/13/21 Rx


 


 Hydrocodone-Apap


 5-325  **


  (Hydrocodone


 Bit/Acetaminophen)


 1 Tab Tablet  1 Tab


 PO PRN Q6-8HRS PRN


  7 9/13/21 Rx


 


 Diltiazem 24Hr ER


  (LA) (Diltiazem


 HCl) 180 Mg


 Tab.er.24h  1 Tab


 PO DAILY


  30 9/10/21 Reported


 


 Xalatan


  (Latanoprost) 2.5


 Ml Drops  1 Drop


 OU BID


   9/8/21 Reported


 


 Triamterene-Hctz


 37.5-25 Mg Cp


  (Triamterene/Hydrochlorothiazid)


 1 Each Capsule  1 Cap


 PO DAILY


   9/8/21 Reported


 


 Ventolin Hfa


 Inhaler


  (Albuterol


 Sulfate) 18 Gm


 Hfa.aer.ad  2 Puff


 INH PRN Q4HRS PRN


   9/8/21 Reported


 


 Singulair Tablet


 ** (Montelukast


 Sodium) 10 Mg


 Tablet  10 Mg


 PO HS


   9/8/21 Reported


 


 Duoneb 0.5-3(2.5)


 Mg/3 Ml


  (Albuterol/Ipratropium)


 3 Ml Ampul.neb  3 Ml


 NEB QID


   9/8/21 Reported


 


 Lantus Solostar


  (Insulin


 Glargine,Hum.rec.anlog)


 100 Unit/1 Ml


 Insuln.pen  30 Unit


 SQ QHS


   9/8/21 Reported


 


 Eliquis


  (Apixaban) 5 Mg


 Tablet  5 Mg


 PO BID


   9/8/21 Reported


 


 Cozaar **


  (Losartan


 Potassium) 25 Mg


 Tablet  25 Mg


 PO DAILY


   9/8/21 Reported


 


 Cosopt Eye Drops


  (Dorzolamide


 Hcl/Timolol


 Maleat) 10 Ml


 Drops  1 Drop


 EACHEYE BID


   9/8/21 Reported


 


 Atorvastatin


 Calcium 10 Mg


 Tablet  10 Mg


 PO HS


   9/8/21 Reported


 


 Admelog (Insulin


 Lispro) 100


 Unit/1 Ml Vial  8 Unit


 SQ TIDAC


   9/8/21 Reported


 


 Admelog (Insulin


 Lispro) 100


 Unit/1 Ml Vial  0-12 Unit


 SQ TIDWMEALS


   9/8/21 Reported


 


 Acetaminophen 325


 Mg Tablet  2 Tab


 PO PRN Q6HRS PRN


  30 9/8/21 Reported











Impression


.


IMPRESSION:


1.  Acute hypoxemic respiratory failure, multifactorial.


2.  Possible bacterial pneumonia.


3.  Bilateral pulmonary infiltrates on chest x-ray, compatible with possible 


pulmonary edema, left-sided effusion.


4.  Multifactorial encephalopathy.


5.  Recent open reduction and internal fixation for hip fracture.


6.  SARS-CoV-2 was negative.


7.  Other comorbidities including type 2 diabetes, neuropathy, dementia, major 


depressive disorder, allergic rhinitis, and atrial fibrillation.








CT chest reviewed


Impression:


1.  Moderate bilateral pleural effusions with adjacent atelectasis or 

consolidations.


2.  Mild right upper lobe groundglass opacities, may represent infectious or 

inflammatory process.


3.  Mildly prominent mediastinal lymph nodes, likely reactive.


4.  Increased bilateral adrenal gland nodular thickening. Recommend follow-up 

adrenal protocol CT or MRI.





Plan


.


Updated 10/11


Discussed with interventional radiologist


Proceed with thoracentesis








Updated 10/10


Schedule thoracentesis in the a.m. with interventional radiologist, need to 

discuss with family


Continue current support


Empiric antibiotics


KADE Gilman MD              Oct 11, 2021 09:29

## 2021-10-12 VITALS — SYSTOLIC BLOOD PRESSURE: 97 MMHG | DIASTOLIC BLOOD PRESSURE: 48 MMHG

## 2021-10-12 VITALS — SYSTOLIC BLOOD PRESSURE: 104 MMHG | DIASTOLIC BLOOD PRESSURE: 50 MMHG

## 2021-10-12 VITALS — DIASTOLIC BLOOD PRESSURE: 45 MMHG | SYSTOLIC BLOOD PRESSURE: 104 MMHG

## 2021-10-12 VITALS — SYSTOLIC BLOOD PRESSURE: 97 MMHG | DIASTOLIC BLOOD PRESSURE: 46 MMHG

## 2021-10-12 VITALS — DIASTOLIC BLOOD PRESSURE: 62 MMHG | SYSTOLIC BLOOD PRESSURE: 136 MMHG

## 2021-10-12 VITALS — SYSTOLIC BLOOD PRESSURE: 93 MMHG | DIASTOLIC BLOOD PRESSURE: 36 MMHG

## 2021-10-12 LAB
ANION GAP SERPL CALC-SCNC: 9 MMOL/L (ref 6–14)
BUN SERPL-MCNC: 21 MG/DL (ref 7–20)
CALCIUM SERPL-MCNC: 8.8 MG/DL (ref 8.5–10.1)
CHLORIDE SERPL-SCNC: 105 MMOL/L (ref 98–107)
CO2 SERPL-SCNC: 32 MMOL/L (ref 21–32)
CREAT SERPL-MCNC: 1.8 MG/DL (ref 0.6–1)
GFR SERPLBLD BASED ON 1.73 SQ M-ARVRAT: 26.7 ML/MIN
GLUCOSE SERPL-MCNC: 182 MG/DL (ref 70–99)
POTASSIUM SERPL-SCNC: 4.2 MMOL/L (ref 3.5–5.1)
SODIUM SERPL-SCNC: 146 MMOL/L (ref 136–145)

## 2021-10-12 RX ADMIN — Medication SCH CAP: at 09:47

## 2021-10-12 RX ADMIN — LATANOPROST SCH DROP: 50 SOLUTION OPHTHALMIC at 09:48

## 2021-10-12 RX ADMIN — Medication SCH CAP: at 22:42

## 2021-10-12 RX ADMIN — TIMOLOL MALEATE SCH DROP: 5 SOLUTION/ DROPS OPHTHALMIC at 22:43

## 2021-10-12 RX ADMIN — INSULIN LISPRO SCH UNITS: 100 INJECTION, SOLUTION INTRAVENOUS; SUBCUTANEOUS at 10:00

## 2021-10-12 RX ADMIN — INSULIN LISPRO SCH UNITS: 100 INJECTION, SOLUTION INTRAVENOUS; SUBCUTANEOUS at 13:07

## 2021-10-12 RX ADMIN — DORZOLAMIDE HYDROCHLORIDE SCH DROP: 20 SOLUTION/ DROPS OPHTHALMIC at 21:00

## 2021-10-12 RX ADMIN — TIMOLOL MALEATE SCH DROP: 5 SOLUTION/ DROPS OPHTHALMIC at 09:48

## 2021-10-12 RX ADMIN — MONTELUKAST SODIUM SCH MG: 10 TABLET, FILM COATED ORAL at 22:42

## 2021-10-12 RX ADMIN — LATANOPROST SCH DROP: 50 SOLUTION OPHTHALMIC at 22:44

## 2021-10-12 RX ADMIN — IPRATROPIUM BROMIDE AND ALBUTEROL SULFATE SCH ML: .5; 3 SOLUTION RESPIRATORY (INHALATION) at 16:34

## 2021-10-12 RX ADMIN — IPRATROPIUM BROMIDE AND ALBUTEROL SULFATE SCH ML: .5; 3 SOLUTION RESPIRATORY (INHALATION) at 12:00

## 2021-10-12 RX ADMIN — DORZOLAMIDE HYDROCHLORIDE SCH DROP: 20 SOLUTION/ DROPS OPHTHALMIC at 09:47

## 2021-10-12 RX ADMIN — LINEZOLID SCH MG: 600 TABLET, FILM COATED ORAL at 09:47

## 2021-10-12 RX ADMIN — IPRATROPIUM BROMIDE AND ALBUTEROL SULFATE SCH ML: .5; 3 SOLUTION RESPIRATORY (INHALATION) at 08:49

## 2021-10-12 RX ADMIN — INSULIN LISPRO SCH UNITS: 100 INJECTION, SOLUTION INTRAVENOUS; SUBCUTANEOUS at 17:56

## 2021-10-12 RX ADMIN — LINEZOLID SCH MG: 600 TABLET, FILM COATED ORAL at 22:42

## 2021-10-12 RX ADMIN — IPRATROPIUM BROMIDE AND ALBUTEROL SULFATE SCH ML: .5; 3 SOLUTION RESPIRATORY (INHALATION) at 20:49

## 2021-10-12 RX ADMIN — ATORVASTATIN CALCIUM SCH MG: 10 TABLET, FILM COATED ORAL at 22:43

## 2021-10-12 NOTE — PN
DATE: 10/12/2021

SUBJECTIVE:  The patient is sitting propped up in bed, eating her breakfast 

comfortably, in no apparent distress.  The nursing staff did not voice any 

concerns, stated that she had uneventful night.  She underwent thoracentesis 

yesterday and about 650 mL of yellow fluid was removed, had had a chest x-ray 

done, which showed improved right-sided pleural effusion, persistent left-sided 

pleural effusion. She continued to have hazy infiltrate with pulmonary edema; 

however, there is no pneumothorax.  The patient has been on IV Lasix and her 

creatinine has jumped to 1.6 and therefore her Lasix was discontinued.



PHYSICAL EXAMINATION:

GENERAL:  When I examined her this morning, she was pale, but no jaundice, 

cyanosis, no lymphadenopathy, no thyromegaly, no jugular venous distention.  No 

lower limb edema.

VITAL SIGNS:  Her heart rate was 119, blood pressure is 136/62, temperature was 

97.7, respiratory rate was 18 and oxygen saturation was 93% on 2 liters of 

oxygen.

HEAD, EYES, EARS, NOSE, AND THROAT:  Normocephalic, atraumatic.

NECK:  Supple.

HEART:  Showed normal first and second heart sounds, no gallop, rub or murmur.

CHEST:  Shows central trachea, equal bilateral chest expansion, air entry, 

vesicular breath sounds, very few scattered rhonchi anteriorly.  She has dull 

percussion note posteriorly, more on the left side.

ABDOMEN:  Distended, soft, nontender.

NEUROLOGIC:  She is demented without any obvious lateralizing sign.



Her intake over the last 24 hours was 600, no output was recorded.



LABORATORY DATA:  Today's labs are still pending at the time of this dictation. 

As of yesterday, her serum sodium was 144, potassium 4.3, chloride 104, 

bicarbonate 34, anion gap of 6, BUN 22, creatinine 1.6.  Estimated GFR was 30 mL

per minute.  Her glucose 191, calcium was 8.8.  Her white cell count was 8300, 

hemoglobin 9.3, hematocrit 29, MCV 87 and platelet count 300,000 with normal 

manual differential.



ASSESSMENT:

1.  Acute hypoxic respiratory failure, multifactorial.

2.  Acute on chronic diastolic congestive heart failure.

3.  Dysphagia.

4.  Probably aspiration pneumonia.

5.  Paroxysmal atrial fibrillation.

6.  Hypertension.

7.  Hyperlipidemia.

8.  Diabetes mellitus.

9.  Dementia with sundowning.



PLAN:  To continue with the IV antibiotic.  Continue with the breathing 

treatment.  Continue with PT, OT.  I am hoping to discharge her back to [facility]

tomorrow.







AMM/VIS

DR: AMM/nts   DD: 10/12/2021 10:37

DT: 10/12/2021 11:48   TID: [id number]

## 2021-10-12 NOTE — PDOC
PULMONARY PROGRESS NOTES


DATE: 10/12/21 


TIME: 08:50


Subjective


No signs of respiratory distress patient 


Appears to be less cough


Vitals





Vital Signs








  Date Time  Temp Pulse Resp B/P (MAP) Pulse Ox O2 Delivery O2 Flow Rate FiO2


 


10/12/21 03:00 98.0 110 20 104/45 (64) 95 Nasal Cannula 2.0 





 98.0       








Lungs:  Clear, Other (Diminished breath sounds in the base)


Cardiovascular:  S1, S2


Abdomen:  Soft


Neuro Exam:  Alert


Extremities:  No Edema


Skin:  Warm


Labs





Laboratory Tests








Test


 10/10/21


12:26 10/10/21


17:39 10/10/21


21:18 10/11/21


07:26


 


Glucose (Fingerstick)


 157 mg/dL


(70-99) 194 mg/dL


(70-99) 173 mg/dL


(70-99) 170 mg/dL


(70-99)


 


Test


 10/11/21


09:20 10/11/21


10:52 10/11/21


13:30 10/11/21


14:56


 


White Blood Count


 8.3 x10^3/uL


(4.0-11.0) 


 


 





 


Red Blood Count


 3.39 x10^6/uL


(3.50-5.40) 


 


 





 


Hemoglobin


 9.3 g/dL


(12.0-15.5) 


 


 





 


Hematocrit


 29.4 %


(36.0-47.0) 


 


 





 


Mean Corpuscular Volume 87 fL ()    


 


Mean Corpuscular Hemoglobin 28 pg (25-35)    


 


Mean Corpuscular Hemoglobin


Concent 32 g/dL


(31-37) 


 


 





 


Red Cell Distribution Width


 18.5 %


(11.5-14.5) 


 


 





 


Platelet Count


 300 x10^3/uL


(140-400) 


 


 





 


Neutrophils (%) (Auto) 66 % (31-73)    


 


Lymphocytes (%) (Auto) 16 % (24-48)    


 


Monocytes (%) (Auto) 14 % (0-9)    


 


Eosinophils (%) (Auto) 4 % (0-3)    


 


Basophils (%) (Auto) 1 % (0-3)    


 


Neutrophils # (Auto)


 5.5 x10^3/uL


(1.8-7.7) 


 


 





 


Lymphocytes # (Auto)


 1.3 x10^3/uL


(1.0-4.8) 


 


 





 


Monocytes # (Auto)


 1.1 x10^3/uL


(0.0-1.1) 


 


 





 


Eosinophils # (Auto)


 0.3 x10^3/uL


(0.0-0.7) 


 


 





 


Basophils # (Auto)


 0.0 x10^3/uL


(0.0-0.2) 


 


 





 


Prothrombin Time


 17.9 SEC


(11.7-14.0) 


 


 





 


Prothromb Time International


Ratio 1.5 (0.8-1.1) 


 


 


 





 


Sodium Level


 144 mmol/L


(136-145) 


 


 





 


Potassium Level


 4.3 mmol/L


(3.5-5.1) 


 


 





 


Chloride Level


 104 mmol/L


() 


 


 





 


Carbon Dioxide Level


 34 mmol/L


(21-32) 


 


 





 


Anion Gap 6 (6-14)    


 


Blood Urea Nitrogen


 22 mg/dL


(7-20) 


 


 





 


Creatinine


 1.6 mg/dL


(0.6-1.0) 


 


 





 


Estimated GFR


(Cockcroft-Gault) 30.6 


 


 


 





 


Glucose Level


 191 mg/dL


(70-99) 


 


 





 


Calcium Level


 8.8 mg/dL


(8.5-10.1) 


 


 





 


Glucose (Fingerstick)


 


 164 mg/dL


(70-99) 


 199 mg/dL


(70-99)


 


Body Fluid Source   Pleural  


 


Body Fluid Color   Yellow  


 


Body Fluid Clarity   Clear  


 


Body Fluid pH   7.67  


 


Body Fluid Nucleated Cells


 


 


 578 /cmm (Not


Established) 





 


Body Fluid Mononuclear WBCs


(%) 


 


 76 % 


 





 


Body Fluid Polymorphonuclear


Cells 


 


 23 % 


 





 


Body Fluid Total RBCs Counted


 


 


 1011 /cmm (Not


Established) 





 


Body Fluid Other Cells (%)   1 %  


 


Test


 10/11/21


19:26 10/12/21


08:27 


 





 


Glucose (Fingerstick)


 191 mg/dL


(70-99) 180 mg/dL


(70-99) 


 











Laboratory Tests








Test


 10/11/21


09:20 10/11/21


10:52 10/11/21


13:30 10/11/21


14:56


 


White Blood Count


 8.3 x10^3/uL


(4.0-11.0) 


 


 





 


Red Blood Count


 3.39 x10^6/uL


(3.50-5.40) 


 


 





 


Hemoglobin


 9.3 g/dL


(12.0-15.5) 


 


 





 


Hematocrit


 29.4 %


(36.0-47.0) 


 


 





 


Mean Corpuscular Volume 87 fL ()    


 


Mean Corpuscular Hemoglobin 28 pg (25-35)    


 


Mean Corpuscular Hemoglobin


Concent 32 g/dL


(31-37) 


 


 





 


Red Cell Distribution Width


 18.5 %


(11.5-14.5) 


 


 





 


Platelet Count


 300 x10^3/uL


(140-400) 


 


 





 


Neutrophils (%) (Auto) 66 % (31-73)    


 


Lymphocytes (%) (Auto) 16 % (24-48)    


 


Monocytes (%) (Auto) 14 % (0-9)    


 


Eosinophils (%) (Auto) 4 % (0-3)    


 


Basophils (%) (Auto) 1 % (0-3)    


 


Neutrophils # (Auto)


 5.5 x10^3/uL


(1.8-7.7) 


 


 





 


Lymphocytes # (Auto)


 1.3 x10^3/uL


(1.0-4.8) 


 


 





 


Monocytes # (Auto)


 1.1 x10^3/uL


(0.0-1.1) 


 


 





 


Eosinophils # (Auto)


 0.3 x10^3/uL


(0.0-0.7) 


 


 





 


Basophils # (Auto)


 0.0 x10^3/uL


(0.0-0.2) 


 


 





 


Prothrombin Time


 17.9 SEC


(11.7-14.0) 


 


 





 


Prothromb Time International


Ratio 1.5 (0.8-1.1) 


 


 


 





 


Sodium Level


 144 mmol/L


(136-145) 


 


 





 


Potassium Level


 4.3 mmol/L


(3.5-5.1) 


 


 





 


Chloride Level


 104 mmol/L


() 


 


 





 


Carbon Dioxide Level


 34 mmol/L


(21-32) 


 


 





 


Anion Gap 6 (6-14)    


 


Blood Urea Nitrogen


 22 mg/dL


(7-20) 


 


 





 


Creatinine


 1.6 mg/dL


(0.6-1.0) 


 


 





 


Estimated GFR


(Cockcroft-Gault) 30.6 


 


 


 





 


Glucose Level


 191 mg/dL


(70-99) 


 


 





 


Calcium Level


 8.8 mg/dL


(8.5-10.1) 


 


 





 


Glucose (Fingerstick)


 


 164 mg/dL


(70-99) 


 199 mg/dL


(70-99)


 


Body Fluid Source   Pleural  


 


Body Fluid Color   Yellow  


 


Body Fluid Clarity   Clear  


 


Body Fluid pH   7.67  


 


Body Fluid Nucleated Cells


 


 


 578 /cmm (Not


Established) 





 


Body Fluid Mononuclear WBCs


(%) 


 


 76 % 


 





 


Body Fluid Polymorphonuclear


Cells 


 


 23 % 


 





 


Body Fluid Total RBCs Counted


 


 


 1011 /cmm (Not


Established) 





 


Body Fluid Other Cells (%)   1 %  


 


Test


 10/11/21


19:26 10/12/21


08:27 


 





 


Glucose (Fingerstick)


 191 mg/dL


(70-99) 180 mg/dL


(70-99) 


 











Medications





Active Scripts








 Medications  Dose


 Route/Sig


 Max Daily Dose Days Date Category


 


 Ativan


  (Lorazepam) 1 Mg


 Tablet  1 Mg


 PO PRN BID PRN


  7 9/13/21 Rx


 


 Hydrocodone-Apap


 5-325  **


  (Hydrocodone


 Bit/Acetaminophen)


 1 Tab Tablet  1 Tab


 PO PRN Q6-8HRS PRN


  7 9/13/21 Rx


 


 Diltiazem 24Hr ER


  (LA) (Diltiazem


 HCl) 180 Mg


 Tab.er.24h  1 Tab


 PO DAILY


  30 9/10/21 Reported


 


 Xalatan


  (Latanoprost) 2.5


 Ml Drops  1 Drop


 OU BID


   9/8/21 Reported


 


 Triamterene-Hctz


 37.5-25 Mg Cp


  (Triamterene/Hydrochlorothiazid)


 1 Each Capsule  1 Cap


 PO DAILY


   9/8/21 Reported


 


 Ventolin Hfa


 Inhaler


  (Albuterol


 Sulfate) 18 Gm


 Hfa.aer.ad  2 Puff


 INH PRN Q4HRS PRN


   9/8/21 Reported


 


 Singulair Tablet


 ** (Montelukast


 Sodium) 10 Mg


 Tablet  10 Mg


 PO HS


   9/8/21 Reported


 


 Duoneb 0.5-3(2.5)


 Mg/3 Ml


  (Albuterol/Ipratropium)


 3 Ml Ampul.neb  3 Ml


 NEB QID


   9/8/21 Reported


 


 Lantus Solostar


  (Insulin


 Glargine,Hum.rec.anlog)


 100 Unit/1 Ml


 Insuln.pen  30 Unit


 SQ QHS


   9/8/21 Reported


 


 Eliquis


  (Apixaban) 5 Mg


 Tablet  5 Mg


 PO BID


   9/8/21 Reported


 


 Cozaar **


  (Losartan


 Potassium) 25 Mg


 Tablet  25 Mg


 PO DAILY


   9/8/21 Reported


 


 Cosopt Eye Drops


  (Dorzolamide


 Hcl/Timolol


 Maleat) 10 Ml


 Drops  1 Drop


 EACHEYE BID


   9/8/21 Reported


 


 Atorvastatin


 Calcium 10 Mg


 Tablet  10 Mg


 PO HS


   9/8/21 Reported


 


 Admelog (Insulin


 Lispro) 100


 Unit/1 Ml Vial  8 Unit


 SQ TIDAC


   9/8/21 Reported


 


 Admelog (Insulin


 Lispro) 100


 Unit/1 Ml Vial  0-12 Unit


 SQ TIDWMEALS


   9/8/21 Reported


 


 Acetaminophen 325


 Mg Tablet  2 Tab


 PO PRN Q6HRS PRN


  30 9/8/21 Reported











Impression


.


IMPRESSION:


1.  Acute hypoxemic respiratory failure, multifactorial.


2.  Possible bacterial pneumonia.


3.  Bilateral pulmonary infiltrates on chest x-ray, compatible with possible 


pulmonary edema, left-sided effusion.


4.  Multifactorial encephalopathy.


5.  Recent open reduction and internal fixation for hip fracture.


6.  SARS-CoV-2 was negative.


7.  Other comorbidities including type 2 diabetes, neuropathy, dementia, major 


depressive disorder, allergic rhinitis, and atrial fibrillation.


8.  Status post thoracentesis on the right removing 650 cc





CT chest reviewed


Impression:


1.  Moderate bilateral pleural effusions with adjacent atelectasis or 

consolidations.


2.  Mild right upper lobe groundglass opacities, may represent infectious or 

inflammatory process.


3.  Mildly prominent mediastinal lymph nodes, likely reactive.


4.  Increased bilateral adrenal gland nodular thickening. Recommend follow-up 

adrenal protocol CT or MRI.





Plan


.


Updated 10/12


Patient in no respiratory distress


Follow-up on analysis of pleural fluid


Suspect transudate of effusion


Discussed with  at the bedside 


Okay to plan discharge for 10/13





updated 10/11


Discussed with interventional radiologist


Proceed with thoracentesis








Updated 10/10


Schedule thoracentesis in the a.m. with interventional radiologist, need to 

discuss with family


Continue current support


Empiric antibiotics


KADE Gilman MD              Oct 12, 2021 08:50

## 2021-10-12 NOTE — PDOC
ESTELITA ARMIJO APRN 10/12/21 1452:


CARDIO Progress Notes


Date and Time


Date of Service


10/12/2021


Time of Evaluation


1400





Subjective


Subjective:  No Chest Pain, No shortness of breath, No Palpitations





Vitals


Vitals





Vital Signs








  Date Time  Temp Pulse Resp B/P (MAP) Pulse Ox O2 Delivery O2 Flow Rate FiO2


 


10/12/21 11:00 98.2 105 18 93/36 (55) 93 Nasal Cannula 2.0 





 98.2       








Weight


Weight [ ]





Input and Output


Intake and Output











Intake and Output 


 


 10/12/21





 07:00


 


Intake Total 530 ml


 


Output Total 650 ml


 


Balance -120 ml


 


 


 


Intake Oral 530 ml


 


Drainage Total 650 ml


 


# Voids 2


 


# Bowel Movements 1











Laboratory


Labs





Laboratory Tests








Test


 10/11/21


14:56 10/11/21


19:26 10/12/21


08:27 10/12/21


11:00


 


Glucose (Fingerstick)


 199 mg/dL


(70-99) 191 mg/dL


(70-99) 180 mg/dL


(70-99) 





 


Sodium Level


 


 


 


 146 mmol/L


(136-145)


 


Potassium Level


 


 


 


 4.2 mmol/L


(3.5-5.1)


 


Chloride Level


 


 


 


 105 mmol/L


()


 


Carbon Dioxide Level


 


 


 


 32 mmol/L


(21-32)


 


Anion Gap    9 (6-14) 


 


Blood Urea Nitrogen


 


 


 


 21 mg/dL


(7-20)


 


Creatinine


 


 


 


 1.8 mg/dL


(0.6-1.0)


 


Estimated GFR


(Cockcroft-Gault) 


 


 


 26.7 





 


Glucose Level


 


 


 


 182 mg/dL


(70-99)


 


Calcium Level


 


 


 


 8.8 mg/dL


(8.5-10.1)


 


Test


 10/12/21


11:36 


 


 





 


Glucose (Fingerstick)


 196 mg/dL


(70-99) 


 


 














Microbiology


Micro





Microbiology


10/11/21 Gram Stain - Final, Resulted


           


10/11/21 Aerobic and Anaerobic Culture - Preliminary, Resulted


           


10/10/21 Gram Stain Evaluation - Final, Resulted


           


10/10/21 Respiratory Culture, Resulted


           Pending





Physical Exam


HEENT:  Neck Supple W Full Motion


Chest:  Symmetric


LUNGS:  Other (diminished)


Heart:  irregularly irregular (AFIB)


Abdomen:  Soft N/T


Extremities:  No Edema


Neurology:  alert, follow commands, confused





Assessment


Assessment


1.  Acute respiratory failure with CHF and probable aspiration PNA. CT chest 

with moderate bilateral pleural effusions.


2.  Acute on chronic diastolic CHF; better after right sided thoracentesis


3.  PAFIB with intermittent RVR.  [facility] 

Presently in RVR


4.  Hypertension; low end. 


5.  Hyperlipidmia; statin 


6.  Diabetes, II


7.  OSBALDO on CKD


8.  Dementia 


9.  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








Recommendations


Likely would not be able to tolerate cardizem CD in regards to BP. Will 

transition to BB tomorrow low dose. IV digoxin x1 today


Discontinue losartan for now


Lasix PRN


Ongoing antibiotic therapy as per ID.





Justicifation of Admission Dx:


Justifications for Admission:


Justification of Admission Dx:  Yes





ADAL MCHUGH MD 10/13/21 0624:


CARDIO Progress Notes


Assessment


Assessment


Patient seen and examined 10/12/21.  Agree with NP's assessment and plan.


AF with episodes of RVR noted on tele - agree with digoxin


s/p thoracentesis feeling better


Ac on chr diast HF better compensated


Continue antibiotics per ID team











ESTELITA ARMIJO          Oct 12, 2021 14:52


ADAL MCHUGH MD           Oct 13, 2021 06:24

## 2021-10-12 NOTE — PDOC
Infectious Disease Note


Subjective


Subjective


Patient more alert today 


pleasantly confused which appears to be baseline


On O2 by nasal cannula





ROS


ROS


No nausea vomiting diarrhea fever





Vital Sign


Vital Signs





Vital Signs








  Date Time  Temp Pulse Resp B/P (MAP) Pulse Ox O2 Delivery O2 Flow Rate FiO2


 


10/12/21 08:52      Nasal Cannula 2.0 


 


10/12/21 07:00 97.7 119 18 136/62 (86) 93   





 97.7       











Physical Exam


PHYSICAL EXAM


GENERAL: Patient alert awake confused appears comfortable, nontoxic-appearing


HEENT:  Normocephalic, atraumatic.  Anicteric.  No thrush.  Oral mucosa moist.


NECK:  Supple.


LUNGS:  Decreased breath sounds at the bases.  No accessory muscle use.  No 


wheezing.


HEART:  S1, S2.  Irregular no murmurs.


ABDOMEN:  Soft, nontender, nondistended.


EXTREMITIES:  No edema, no cyanosis.


DERMATOLOGIC:  Warm, dry, no generalized rash.


NEUROLOGIC:  Lethargic, sleepy, arousable.


PSYCHIATRIC:  Unable to assess.





Labs


Lab





Laboratory Tests








Test


 10/11/21


09:20 10/11/21


10:52 10/11/21


13:30 10/11/21


14:56


 


White Blood Count


 8.3 x10^3/uL


(4.0-11.0) 


 


 





 


Red Blood Count


 3.39 x10^6/uL


(3.50-5.40) 


 


 





 


Hemoglobin


 9.3 g/dL


(12.0-15.5) 


 


 





 


Hematocrit


 29.4 %


(36.0-47.0) 


 


 





 


Mean Corpuscular Volume 87 fL ()    


 


Mean Corpuscular Hemoglobin 28 pg (25-35)    


 


Mean Corpuscular Hemoglobin


Concent 32 g/dL


(31-37) 


 


 





 


Red Cell Distribution Width


 18.5 %


(11.5-14.5) 


 


 





 


Platelet Count


 300 x10^3/uL


(140-400) 


 


 





 


Neutrophils (%) (Auto) 66 % (31-73)    


 


Lymphocytes (%) (Auto) 16 % (24-48)    


 


Monocytes (%) (Auto) 14 % (0-9)    


 


Eosinophils (%) (Auto) 4 % (0-3)    


 


Basophils (%) (Auto) 1 % (0-3)    


 


Neutrophils # (Auto)


 5.5 x10^3/uL


(1.8-7.7) 


 


 





 


Lymphocytes # (Auto)


 1.3 x10^3/uL


(1.0-4.8) 


 


 





 


Monocytes # (Auto)


 1.1 x10^3/uL


(0.0-1.1) 


 


 





 


Eosinophils # (Auto)


 0.3 x10^3/uL


(0.0-0.7) 


 


 





 


Basophils # (Auto)


 0.0 x10^3/uL


(0.0-0.2) 


 


 





 


Prothrombin Time


 17.9 SEC


(11.7-14.0) 


 


 





 


Prothromb Time International


Ratio 1.5 (0.8-1.1) 


 


 


 





 


Sodium Level


 144 mmol/L


(136-145) 


 


 





 


Potassium Level


 4.3 mmol/L


(3.5-5.1) 


 


 





 


Chloride Level


 104 mmol/L


() 


 


 





 


Carbon Dioxide Level


 34 mmol/L


(21-32) 


 


 





 


Anion Gap 6 (6-14)    


 


Blood Urea Nitrogen


 22 mg/dL


(7-20) 


 


 





 


Creatinine


 1.6 mg/dL


(0.6-1.0) 


 


 





 


Estimated GFR


(Cockcroft-Gault) 30.6 


 


 


 





 


Glucose Level


 191 mg/dL


(70-99) 


 


 





 


Calcium Level


 8.8 mg/dL


(8.5-10.1) 


 


 





 


Glucose (Fingerstick)


 


 164 mg/dL


(70-99) 


 199 mg/dL


(70-99)


 


Body Fluid Source   Pleural  


 


Body Fluid Color   Yellow  


 


Body Fluid Clarity   Clear  


 


Body Fluid pH   7.67  


 


Body Fluid Nucleated Cells


 


 


 578 /cmm (Not


Established) 





 


Body Fluid Mononuclear WBCs


(%) 


 


 76 % 


 





 


Body Fluid Polymorphonuclear


Cells 


 


 23 % 


 





 


Body Fluid Total RBCs Counted


 


 


 1011 /cmm (Not


Established) 





 


Body Fluid Other Cells (%)   1 %  


 


Test


 10/11/21


19:26 10/12/21


08:27 


 





 


Glucose (Fingerstick)


 191 mg/dL


(70-99) 180 mg/dL


(70-99) 


 











Micro


Fluid culture is negative





Objective


Assessment


1.  Acute hypoxic respiratory failure, likely aspiration pneumonia.


     CT chest noted


2.  Underlying chronic obstructive pulmonary disease and mild intermittent 


asthma.


3.  Encephalopathy with underlying dementia.


4.  Chronic kidney disease.


5.  Diabetes mellitus with neuropathy.


6.  Right ankle ulcer.


7.  CHF, atrial fibrillation 


8  History of ALLERGIES TO PENICILLIN intermediate, has tolerated Keflex per 


pharmacy.





Plan


Plan of Care


Continue cefepime and linezolid,, changed to p.o. for discharge 


Monitor labs and cultures


Maintain aspiration precaution


Continue supportive care











MIHAELA LAMB MD               Oct 12, 2021 09:19

## 2021-10-12 NOTE — DS
DATE OF DISCHARGE: 09/29/2021

HOSPITAL COURSE:  The patient is an 86-year-old  female patient who was

brought to the Emergency Room of [facility] with increased back pain.  

She was evaluated in the Emergency Room extensively and her CT scan showed that 

she has bilateral infiltrate consistent with healthcare-associated pneumonia.  

She was treated with IV antibiotic in the form of Levaquin, AS SHE IS ALLERGIC 

TO PENICILLIN.  She had a prolonged stay as she has multiple episodes of 

hypoglycemia and also has multiple episodes of aspiration.  She has been also 

sundowning and we have had multiple discussions with her family regarding 

hospice and comfort care, but the family insisted on pursuing aggressive 

treatment and once her blood sugar stabilized, decision was made to discharge 

her back to [facility].  She was in fact seen by the speech 

therapist and she was started on dysphagia 2 with nectar thickened liquids.  She

was also seen by the wound care team and the patient treated according to their 

instruction.



PHYSICAL EXAMINATION:

GENERAL:  On day of discharge, the patient looked well and was clearly in no 

apparent respiratory distress.  She was pale, but no jaundice, cyanosis or 

thyromegaly.  No jugular venous distention.  No limb edema.

VITAL SIGNS:  Her heart rate was 83, blood pressure was 102/56, temperature was 

98.6, respiratory rate was 18 and oxygen saturation was 96% on 2 liters of 

oxygen.

HEAD, EYES, EARS, NOSE, AND THROAT:  Showed normocephalic, atraumatic.

NECK:  Supple.

HEART:  Showed normal first and second heart sounds, no gallop, rub or murmur.

CHEST:  Shows central trachea, equally reduced expansion, reduced air entry, 

vesicular breath sounds with bilateral scattered rhonchi and bilateral basal 

crepitation posteriorly.

ABDOMEN:  Distended, soft, nontender.

NEUROLOGIC:  She is profoundly demented, but without any obvious lateralizing 

sign.  All her cranial nerves are intact.  She moves her extremities without 

difficulty.  She ambulates for short distances with a walker.



LABORATORY DATA:  Her lab work on the day of discharge showed her white cell 

count was 10,000, hemoglobin 10, hematocrit 30, MCV 87 and platelet count of 

353,000.  Her chemistry showed a serum sodium 137, potassium 3.2, chloride 100, 

bicarbonate 31, anion gap of 6, BUN 21, creatinine 1.2.  Estimated GFR was 42 mL

per minute.  Her glucose was 157, calcium was 8.6.  Her coronavirus by PCR was 

negative.



DISCHARGE MEDICATIONS:  She was discharged back to [facility] to

continue on the following medications:  Albuterol sulfate 2 puffs every 4-6 

hours, albuterol sulfate by nebulizer every 6 hours, apixaban 5 mg twice a day, 

atorvastatin calcium 10 mg at bedtime, diltiazem extended release 180 mg daily, 

Colace 100 mg twice a day, dorzolamide/timolol 1 drop to both eyes daily, 

hydrocodone/APAP 5/325 one tablet every 6 hours as needed.  She is on Lantus 

insulin 30 units subcutaneously at bedtime, latanoprost 1 drop to both eyes 

twice a day, lorazepam 1 mg p.o. daily for anxiety, losartan potassium 25 mg 

p.o. daily, Singulair 10 mg once a day, triamterene/hydrochlorothiazide 37.5/25 

one tablet once a day.



FINAL DISCHARGE DIAGNOSES:

1.  Acute hypoxic respiratory failure, multifactorial including:

A.  Acute on chronic diastolic congestive heart failure.

B.  Pneumonia, likely aspiration.

2.  Tachyarrhythmias consistent with multifocal atrial tachycardia versus atrial

fibrillation.  She is on Cardizem.  She is currently also on Eliquis for stroke 

prevention and deep vein thrombosis treatment.

3.  Hypertension.

4.  Hyperlipidemia.

5.  Type 2 diabetes mellitus.

6.  Chronic kidney disease.

7.  Dementia.

8.  History of deep vein thrombosis of her right lower extremity.

9.  Recurrent aspiration pneumonia for which video swallowing evaluation showed 

that she has tendency to aspirate and that she is now on dysphagia 2 diet with 

nectar thickened liquid.







AMM/KRI/MAN

DR: AMM/nts   DD: 10/12/2021 10:50

DT: 10/12/2021 11:31   TID: [id number]

## 2021-10-13 VITALS — DIASTOLIC BLOOD PRESSURE: 56 MMHG | SYSTOLIC BLOOD PRESSURE: 106 MMHG

## 2021-10-13 VITALS — SYSTOLIC BLOOD PRESSURE: 98 MMHG | DIASTOLIC BLOOD PRESSURE: 62 MMHG

## 2021-10-13 VITALS — SYSTOLIC BLOOD PRESSURE: 112 MMHG | DIASTOLIC BLOOD PRESSURE: 48 MMHG

## 2021-10-13 VITALS — SYSTOLIC BLOOD PRESSURE: 118 MMHG | DIASTOLIC BLOOD PRESSURE: 52 MMHG

## 2021-10-13 VITALS — DIASTOLIC BLOOD PRESSURE: 45 MMHG | SYSTOLIC BLOOD PRESSURE: 107 MMHG

## 2021-10-13 VITALS — SYSTOLIC BLOOD PRESSURE: 99 MMHG | DIASTOLIC BLOOD PRESSURE: 46 MMHG

## 2021-10-13 LAB
ANION GAP SERPL CALC-SCNC: 4 MMOL/L (ref 6–14)
BUN SERPL-MCNC: 24 MG/DL (ref 7–20)
CALCIUM SERPL-MCNC: 8.8 MG/DL (ref 8.5–10.1)
CHLORIDE SERPL-SCNC: 110 MMOL/L (ref 98–107)
CO2 SERPL-SCNC: 32 MMOL/L (ref 21–32)
CREAT SERPL-MCNC: 1.6 MG/DL (ref 0.6–1)
GFR SERPLBLD BASED ON 1.73 SQ M-ARVRAT: 30.6 ML/MIN
GLUCOSE SERPL-MCNC: 208 MG/DL (ref 70–99)
POTASSIUM SERPL-SCNC: 4.3 MMOL/L (ref 3.5–5.1)
SODIUM SERPL-SCNC: 146 MMOL/L (ref 136–145)

## 2021-10-13 RX ADMIN — IPRATROPIUM BROMIDE AND ALBUTEROL SULFATE SCH ML: .5; 3 SOLUTION RESPIRATORY (INHALATION) at 12:22

## 2021-10-13 RX ADMIN — BACITRACIN SCH MLS/HR: 5000 INJECTION, POWDER, FOR SOLUTION INTRAMUSCULAR at 11:28

## 2021-10-13 RX ADMIN — ATORVASTATIN CALCIUM SCH MG: 10 TABLET, FILM COATED ORAL at 21:51

## 2021-10-13 RX ADMIN — DORZOLAMIDE HYDROCHLORIDE SCH DROP: 20 SOLUTION/ DROPS OPHTHALMIC at 09:52

## 2021-10-13 RX ADMIN — TIMOLOL MALEATE SCH DROP: 5 SOLUTION/ DROPS OPHTHALMIC at 22:20

## 2021-10-13 RX ADMIN — Medication SCH CAP: at 09:51

## 2021-10-13 RX ADMIN — DORZOLAMIDE HYDROCHLORIDE SCH DROP: 20 SOLUTION/ DROPS OPHTHALMIC at 22:17

## 2021-10-13 RX ADMIN — IPRATROPIUM BROMIDE AND ALBUTEROL SULFATE SCH ML: .5; 3 SOLUTION RESPIRATORY (INHALATION) at 16:50

## 2021-10-13 RX ADMIN — MONTELUKAST SODIUM SCH MG: 10 TABLET, FILM COATED ORAL at 21:51

## 2021-10-13 RX ADMIN — LATANOPROST SCH DROP: 50 SOLUTION OPHTHALMIC at 09:52

## 2021-10-13 RX ADMIN — IPRATROPIUM BROMIDE AND ALBUTEROL SULFATE SCH ML: .5; 3 SOLUTION RESPIRATORY (INHALATION) at 19:45

## 2021-10-13 RX ADMIN — METOPROLOL SUCCINATE SCH MG: 25 TABLET, EXTENDED RELEASE ORAL at 12:37

## 2021-10-13 RX ADMIN — TIMOLOL MALEATE SCH DROP: 5 SOLUTION/ DROPS OPHTHALMIC at 09:51

## 2021-10-13 RX ADMIN — INSULIN LISPRO SCH UNITS: 100 INJECTION, SOLUTION INTRAVENOUS; SUBCUTANEOUS at 08:00

## 2021-10-13 RX ADMIN — Medication SCH CAP: at 21:51

## 2021-10-13 RX ADMIN — INSULIN LISPRO SCH UNITS: 100 INJECTION, SOLUTION INTRAVENOUS; SUBCUTANEOUS at 17:00

## 2021-10-13 RX ADMIN — LATANOPROST SCH DROP: 50 SOLUTION OPHTHALMIC at 22:17

## 2021-10-13 RX ADMIN — CEFDINIR SCH MG: 300 CAPSULE ORAL at 21:51

## 2021-10-13 RX ADMIN — INSULIN LISPRO SCH UNITS: 100 INJECTION, SOLUTION INTRAVENOUS; SUBCUTANEOUS at 12:39

## 2021-10-13 RX ADMIN — IPRATROPIUM BROMIDE AND ALBUTEROL SULFATE SCH ML: .5; 3 SOLUTION RESPIRATORY (INHALATION) at 07:57

## 2021-10-13 RX ADMIN — APIXABAN SCH MG: 2.5 TABLET, FILM COATED ORAL at 21:51

## 2021-10-13 RX ADMIN — APIXABAN SCH MG: 2.5 TABLET, FILM COATED ORAL at 12:36

## 2021-10-13 NOTE — PDOC
PULMONARY PROGRESS NOTES


DATE: 10/13/21 


TIME: 08:33


Subjective


No signs of respiratory distress patient 


Appears to be less cough


Vitals





Vital Signs








  Date Time  Temp Pulse Resp B/P (MAP) Pulse Ox O2 Delivery O2 Flow Rate FiO2


 


10/13/21 07:58     97 Nasal Cannula 2.0 


 


10/13/21 03:47 98.1 86 20 107/45 (65)    





 98.1       








Lungs:  Clear, Other (Diminished breath sounds in the base)


Cardiovascular:  S1, S2


Abdomen:  Soft


Neuro Exam:  Alert


Extremities:  No Edema


Skin:  Warm


Labs





Laboratory Tests








Test


 10/11/21


09:20 10/11/21


10:52 10/11/21


13:30 10/11/21


14:56


 


White Blood Count


 8.3 x10^3/uL


(4.0-11.0) 


 


 





 


Red Blood Count


 3.39 x10^6/uL


(3.50-5.40) 


 


 





 


Hemoglobin


 9.3 g/dL


(12.0-15.5) 


 


 





 


Hematocrit


 29.4 %


(36.0-47.0) 


 


 





 


Mean Corpuscular Volume 87 fL ()    


 


Mean Corpuscular Hemoglobin 28 pg (25-35)    


 


Mean Corpuscular Hemoglobin


Concent 32 g/dL


(31-37) 


 


 





 


Red Cell Distribution Width


 18.5 %


(11.5-14.5) 


 


 





 


Platelet Count


 300 x10^3/uL


(140-400) 


 


 





 


Neutrophils (%) (Auto) 66 % (31-73)    


 


Lymphocytes (%) (Auto) 16 % (24-48)    


 


Monocytes (%) (Auto) 14 % (0-9)    


 


Eosinophils (%) (Auto) 4 % (0-3)    


 


Basophils (%) (Auto) 1 % (0-3)    


 


Neutrophils # (Auto)


 5.5 x10^3/uL


(1.8-7.7) 


 


 





 


Lymphocytes # (Auto)


 1.3 x10^3/uL


(1.0-4.8) 


 


 





 


Monocytes # (Auto)


 1.1 x10^3/uL


(0.0-1.1) 


 


 





 


Eosinophils # (Auto)


 0.3 x10^3/uL


(0.0-0.7) 


 


 





 


Basophils # (Auto)


 0.0 x10^3/uL


(0.0-0.2) 


 


 





 


Prothrombin Time


 17.9 SEC


(11.7-14.0) 


 


 





 


Prothromb Time International


Ratio 1.5 (0.8-1.1) 


 


 


 





 


Sodium Level


 144 mmol/L


(136-145) 


 


 





 


Potassium Level


 4.3 mmol/L


(3.5-5.1) 


 


 





 


Chloride Level


 104 mmol/L


() 


 


 





 


Carbon Dioxide Level


 34 mmol/L


(21-32) 


 


 





 


Anion Gap 6 (6-14)    


 


Blood Urea Nitrogen


 22 mg/dL


(7-20) 


 


 





 


Creatinine


 1.6 mg/dL


(0.6-1.0) 


 


 





 


Estimated GFR


(Cockcroft-Gault) 30.6 


 


 


 





 


Glucose Level


 191 mg/dL


(70-99) 


 


 





 


Calcium Level


 8.8 mg/dL


(8.5-10.1) 


 


 





 


Glucose (Fingerstick)


 


 164 mg/dL


(70-99) 


 199 mg/dL


(70-99)


 


Body Fluid Source   Pleural  


 


Body Fluid Color   Yellow  


 


Body Fluid Clarity   Clear  


 


Body Fluid pH   7.67  


 


Body Fluid Nucleated Cells


 


 


 578 /cmm (Not


Established) 





 


Body Fluid Mononuclear WBCs


(%) 


 


 76 % 


 





 


Body Fluid Polymorphonuclear


Cells 


 


 23 % 


 





 


Body Fluid Total RBCs Counted


 


 


 1011 /cmm (Not


Established) 





 


Body Fluid Other Cells (%)   1 %  


 


Test


 10/11/21


19:26 10/12/21


08:27 10/12/21


11:00 10/12/21


11:36


 


Glucose (Fingerstick)


 191 mg/dL


(70-99) 180 mg/dL


(70-99) 


 196 mg/dL


(70-99)


 


Sodium Level


 


 


 146 mmol/L


(136-145) 





 


Potassium Level


 


 


 4.2 mmol/L


(3.5-5.1) 





 


Chloride Level


 


 


 105 mmol/L


() 





 


Carbon Dioxide Level


 


 


 32 mmol/L


(21-32) 





 


Anion Gap   9 (6-14)  


 


Blood Urea Nitrogen


 


 


 21 mg/dL


(7-20) 





 


Creatinine


 


 


 1.8 mg/dL


(0.6-1.0) 





 


Estimated GFR


(Cockcroft-Gault) 


 


 26.7 


 





 


Glucose Level


 


 


 182 mg/dL


(70-99) 





 


Calcium Level


 


 


 8.8 mg/dL


(8.5-10.1) 





 


Test


 10/12/21


17:06 10/12/21


20:22 10/13/21


07:44 





 


Glucose (Fingerstick)


 252 mg/dL


(70-99) 228 mg/dL


(70-99) 220 mg/dL


(70-99) 











Laboratory Tests








Test


 10/12/21


11:00 10/12/21


11:36 10/12/21


17:06 10/12/21


20:22


 


Sodium Level


 146 mmol/L


(136-145) 


 


 





 


Potassium Level


 4.2 mmol/L


(3.5-5.1) 


 


 





 


Chloride Level


 105 mmol/L


() 


 


 





 


Carbon Dioxide Level


 32 mmol/L


(21-32) 


 


 





 


Anion Gap 9 (6-14)    


 


Blood Urea Nitrogen


 21 mg/dL


(7-20) 


 


 





 


Creatinine


 1.8 mg/dL


(0.6-1.0) 


 


 





 


Estimated GFR


(Cockcroft-Gault) 26.7 


 


 


 





 


Glucose Level


 182 mg/dL


(70-99) 


 


 





 


Calcium Level


 8.8 mg/dL


(8.5-10.1) 


 


 





 


Glucose (Fingerstick)


 


 196 mg/dL


(70-99) 252 mg/dL


(70-99) 228 mg/dL


(70-99)


 


Test


 10/13/21


07:44 


 


 





 


Glucose (Fingerstick)


 220 mg/dL


(70-99) 


 


 











Medications





Active Scripts








 Medications  Dose


 Route/Sig


 Max Daily Dose Days Date Category


 


 Ativan


  (Lorazepam) 1 Mg


 Tablet  1 Mg


 PO PRN BID PRN


  7 9/13/21 Rx


 


 Hydrocodone-Apap


 5-325  **


  (Hydrocodone


 Bit/Acetaminophen)


 1 Tab Tablet  1 Tab


 PO PRN Q6-8HRS PRN


  7 9/13/21 Rx


 


 Diltiazem 24Hr ER


  (LA) (Diltiazem


 HCl) 180 Mg


 Tab.er.24h  1 Tab


 PO DAILY


  30 9/10/21 Reported


 


 Xalatan


  (Latanoprost) 2.5


 Ml Drops  1 Drop


 OU BID


   9/8/21 Reported


 


 Triamterene-Hctz


 37.5-25 Mg Cp


  (Triamterene/Hydrochlorothiazid)


 1 Each Capsule  1 Cap


 PO DAILY


   9/8/21 Reported


 


 Ventolin Hfa


 Inhaler


  (Albuterol


 Sulfate) 18 Gm


 Hfa.aer.ad  2 Puff


 INH PRN Q4HRS PRN


   9/8/21 Reported


 


 Singulair Tablet


 ** (Montelukast


 Sodium) 10 Mg


 Tablet  10 Mg


 PO HS


   9/8/21 Reported


 


 Duoneb 0.5-3(2.5)


 Mg/3 Ml


  (Albuterol/Ipratropium)


 3 Ml Ampul.neb  3 Ml


 NEB QID


   9/8/21 Reported


 


 Lantus Solostar


  (Insulin


 Glargine,Hum.rec.anlog)


 100 Unit/1 Ml


 Insuln.pen  30 Unit


 SQ QHS


   9/8/21 Reported


 


 Eliquis


  (Apixaban) 5 Mg


 Tablet  5 Mg


 PO BID


   9/8/21 Reported


 


 Cozaar **


  (Losartan


 Potassium) 25 Mg


 Tablet  25 Mg


 PO DAILY


   9/8/21 Reported


 


 Cosopt Eye Drops


  (Dorzolamide


 Hcl/Timolol


 Maleat) 10 Ml


 Drops  1 Drop


 EACHEYE BID


   9/8/21 Reported


 


 Atorvastatin


 Calcium 10 Mg


 Tablet  10 Mg


 PO HS


   9/8/21 Reported


 


 Admelog (Insulin


 Lispro) 100


 Unit/1 Ml Vial  8 Unit


 SQ TIDAC


   9/8/21 Reported


 


 Admelog (Insulin


 Lispro) 100


 Unit/1 Ml Vial  0-12 Unit


 SQ TIDWMEALS


   9/8/21 Reported


 


 Acetaminophen 325


 Mg Tablet  2 Tab


 PO PRN Q6HRS PRN


  30 9/8/21 Reported











Impression


.


IMPRESSION:


1.  Acute hypoxemic respiratory failure, multifactorial.


2.  Possible bacterial pneumonia.


3.  Bilateral pulmonary infiltrates on chest x-ray, compatible with possible 


pulmonary edema, left-sided effusion.


4.  Multifactorial encephalopathy.


5.  Recent open reduction and internal fixation for hip fracture.


6.  SARS-CoV-2 was negative.


7.  Other comorbidities including type 2 diabetes, neuropathy, dementia, major 


depressive disorder, allergic rhinitis, and atrial fibrillation.


8.  Status post thoracentesis on the right removing 650 cc





CT chest reviewed


Impression:


1.  Moderate bilateral pleural effusions with adjacent atelectasis or 

consolidations.


2.  Mild right upper lobe groundglass opacities, may represent infectious or 

inflammatory process.


3.  Mildly prominent mediastinal lymph nodes, likely reactive.


4.  Increased bilateral adrenal gland nodular thickening. Recommend follow-up 

adrenal protocol CT or MRI.





Plan


.


Updated 10/12


Patient in no respiratory distress


Follow-up on analysis of pleural fluid


Suspect transudate of effusion


Discussed with  at the bedside 


Okay to plan discharge for 10/13





updated 10/11


Discussed with interventional radiologist


Proceed with thoracentesis








Updated 10/10


Schedule thoracentesis in the a.m. with interventional radiologist, need to 

discuss with family


Continue current support


Empiric antibiotics


KADE Gilman MD              Oct 13, 2021 08:34

## 2021-10-13 NOTE — NUR
Non administered patients 1700 dose of insulin due to patient refusing to eat. pt asleep and 
states she wants to sleep.

## 2021-10-13 NOTE — PN
DATE: 10/13/2021

SUBJECTIVE:  The patient is resting, slightly propped up in bed, in no apparent 

distress.  She continued to be somewhat confused, but otherwise seems to be in 

no respiratory distress.  Nursing staff did not voice any concern.  Her 

creatinine has risen yesterday to 1.8.  We did actually scan her bladder showed 

no evidence of postvoid residual.  We did consult the nephrologist and he 

recommended starting her on IV fluid and I did start her on normal saline at 75 

mL per hour.  On questioning her, she looked pale, not jaundiced, cyanosed. No 

lymphadenopathy, no thyromegaly, no jugular venous distention.  No limb edema.



OBJECTIVE:

VITAL SIGNS:  Her heart rate was 93, blood pressure was 99/46, temperature 97, 

respiratory rate was 16 and oxygen saturation was 97% on 2 liters of oxygen.  

The rest of clinical exam is stable.



LABORATORY DATA:  Her lab work this morning showed a serum sodium 146, potassium

4.3, chloride 110, bicarbonate 32, anion gap of 4, BUN 24, creatinine 1.6.  

Estimated GFR was 30 mL per minute.  Her glucose was 208, calcium was 8.8.



ASSESSMENT:

1.  Acute hypoxic respiratory failure, multifactorial.

2.  Acute on chronic diastolic congestive heart failure.

3.  Dysphagia.

4.  Probable aspiration pneumonia.

5.  Paroxysmal atrial fibrillation.

6.  Hypertension.

7.  Hyperlipidemia.

8.  Diabetes mellitus.

9.  Dementia with sundowning.

10.  Acute on chronic kidney injury, slight improvement in creatinine from 1.8 

to 1.6.



PLAN:  To start IV fluid in the form of normal saline at 75 mL per hour.  Her 

antibiotics were switched to oral cefdinir.  Meanwhile, continue PT, OT.  We 

will check her coronavirus by PCR and hopefully discharge her back to [facility] 

tomorrow.







[facility] [place]: [facility]   DD: 10/13/2021 11:55

DT: 10/13/2021 12:07   TID: [id number]

## 2021-10-13 NOTE — PDOC
Infectious Disease Note


Subjective


Subjective


Patient alert


pleasantly confused which appears to be baseline


On O2 by nasal cannula





ROS


ROS


No nausea vomiting diarrhea or fever





Vital Sign


Vital Signs





Vital Signs








  Date Time  Temp Pulse Resp B/P (MAP) Pulse Ox O2 Delivery O2 Flow Rate FiO2


 


10/13/21 07:58     97 Nasal Cannula 2.0 


 


10/13/21 07:00 97.0 93 16 99/46 (63)    





 97.0       











Physical Exam


PHYSICAL EXAM


GENERAL: Patient alert awake confused appears comfortable, nontoxic-appearing


HEENT:  Normocephalic, atraumatic.  Anicteric.  No thrush.  Oral mucosa moist.


NECK:  Supple.


LUNGS:  Decreased breath sounds at the bases.  No accessory muscle use.  No 


wheezing.


HEART:  S1, S2.  Irregular no murmurs.


ABDOMEN:  Soft, nontender, nondistended.


EXTREMITIES:  No edema, no cyanosis.


DERMATOLOGIC:  Warm, dry, no generalized rash.


NEUROLOGIC:  Lethargic, sleepy, arousable.


PSYCHIATRIC:  Unable to assess.





Labs


Lab





Laboratory Tests








Test


 10/12/21


11:00 10/12/21


11:36 10/12/21


17:06 10/12/21


20:22


 


Sodium Level


 146 mmol/L


(136-145) 


 


 





 


Potassium Level


 4.2 mmol/L


(3.5-5.1) 


 


 





 


Chloride Level


 105 mmol/L


() 


 


 





 


Carbon Dioxide Level


 32 mmol/L


(21-32) 


 


 





 


Anion Gap 9 (6-14)    


 


Blood Urea Nitrogen


 21 mg/dL


(7-20) 


 


 





 


Creatinine


 1.8 mg/dL


(0.6-1.0) 


 


 





 


Estimated GFR


(Cockcroft-Gault) 26.7 


 


 


 





 


Glucose Level


 182 mg/dL


(70-99) 


 


 





 


Calcium Level


 8.8 mg/dL


(8.5-10.1) 


 


 





 


Glucose (Fingerstick)


 


 196 mg/dL


(70-99) 252 mg/dL


(70-99) 228 mg/dL


(70-99)


 


Test


 10/13/21


07:44 


 


 





 


Glucose (Fingerstick)


 220 mg/dL


(70-99) 


 


 











Micro


Fluid culture is negative





Objective


Assessment


1.  Acute hypoxic respiratory failure, likely aspiration pneumonia.


     CT chest noted


2.  Underlying chronic obstructive pulmonary disease and mild intermittent 


asthma.


3.  Encephalopathy with underlying dementia.


4.  Chronic kidney disease.


5.  Diabetes mellitus with neuropathy.


6.  Right ankle ulcer.


7.  CHF, atrial fibrillation 


8  History of ALLERGIES TO PENICILLIN intermediate, has tolerated Keflex per 


pharmacy.





Plan


Plan of Care


 cefepime and linezolid,, changed to p.o. for discharge 


Monitor labs and cultures


Maintain aspiration precaution


Continue supportive care











MIHAELA LAMB MD               Oct 13, 2021 09:39

## 2021-10-13 NOTE — PATHOLOGY
Note

[facility] Accession Number: [id number]

   TESTS               RESULT  FLAG  UNITS    REF RANGE  LAB

------------------------------------------------------------

   Clinician Provided Cytology Information

   No. of containers..01 Other (Miscellaneous)

Source:                                                   01

   RIGHT PLEURAL

DIAGNOSIS:                                                02

   RIGHT PLEURAL

   NEGATIVE FOR MALIGNANT CELLS.

   REACTIVE MESOTHELIAL CELLS AND CHRONIC INFLAMMATORY CELLS PRESENT.

   THIS INTERPRETATION INCLUDES EVALUATION OF A CELL BLOCK.

Signed out by:                                            02

   Rodolfo Hamilton MD, Pathologist

   NPI- [id number]

Performed by:                                             01

   Qian Krishnamurthy, Cytotechnologist ([facility])

Gross description:                                        01

   50ML, CLOUDY, YELLOW

   /LCS  10/12/2021  1450 Local



------------------------------------------------------------

    FLAG LEGEND:

    L-Low Normal,H-High Normal,LL-Alert Low,HH-Alert High

    <-Panic Low,>-Panic High,A-Abnormal,AA-Critical Abnormal

------------------------------------------------------------



Performed at:

01 [facility]

   [address]

   [place]  [address]

   Filemon Harris MD, Phone: [phone]

[address] [facility]

   [address] [place]  [address]

   Rodolfo Hamilton MD, Phone: [phone]

Specimen Comment: A courtesy copy of this report has been sent to [phone], [phone]

Specimen Comment: [id number], [phone]

Specimen Comment: Report sent to , DR GUADALUPE / DR ANDERSON

Specimen Comment: A duplicate report has been generated due to demographic updates.

***Performed at:  01

   [facility]

   [address], [place], [place]  [id number]

   MD Filemon Harris MD Phone:  [phone]

## 2021-10-13 NOTE — PDOC2
CONSULT


Date of Consult


Date of Consult


DATE: 10/13/21 


TIME: 14:47





Reason for Consult


Reason for Consult:


OSBALDO





Referring Physician


Referring Physician:


MONICA





Identification/Chief Complaint


Chief Complaint


SOB AND WEAKNESS





Source


Source:  Chart review





History of Present Illness


Reason for Visit:


THIS IS AN 86 YR WITH DEMENTIA. ADMITTED WITH WORSENING CONFUSION. IMAGING 

NOTABLE FOR CHF AND LEFT SIDED PLEURAL EFFUSION. UNDERWENT DIURESIS AND 

THORACENTESIS. CR OF 1.9. HAS CKD STAGE 3B WITH CR OF ABOUT 1.2-1.6 AT BASELINE.

HAS NOT BEEN EATING PER AROLDO. CKD DUE TO NEPHROSCLEROSIS AND HTN. NO UA TO 

REVIEW. HEMODYNAMICALLY STABLE AND NO NEPHROTOXIN EXPOSURE NOTED. CURRENTLY 

THERE IS CONCERN OF ASP PNA AND HE IS ON ANTIBIOTICS FOR THIS. HAS BEEN ON 

MAXZIDE AND COZAAR OUTSIDE OF THE HOSPITAL.





Past Medical History


Cardiovascular:  HTN


CENTRAL NERVOUS SYSTEM:  Dementia


GI:  Constipation


Musculoskeletal:  Osteoarthritis


Renal/:  No pertinent hx, Urinary Incontinence


Endocrine:  No pertinent hx





Past Surgical History


Past Surgical History


UNKNOWN





Family History


Family History:  No Significant





Social History


No


ALCOHOL:  none


Lives:  with Family





Current Medications


Current Medications





Current Medications


Acetaminophen (Tylenol) 650 mg PRN Q6HRS  PRN PO MILD PAIN / TEMP > 100.3'F;  

Start 10/8/21 at 15:30


Apixaban (Eliquis) 5 mg BID PO  Last administered on 10/9/21at 21:00;  Start 

10/8/21 at 21:00;  Stop 10/10/21 at 08:36;  Status DC


Atorvastatin Calcium (Lipitor) 10 mg HS PO  Last administered on 10/12/21at 

22:43;  Start 10/8/21 at 21:00


Albuterol/ Ipratropium (Duoneb) 3 ml RTQID NEB  Last administered on 10/13/21at 

12:22;  Start 10/8/21 at 16:00


Latanoprost (Xalatan) 1 drop BID OU  Last administered on 10/13/21at 09:52;  

Start 10/8/21 at 21:00


Losartan Potassium (Cozaar) 25 mg DAILY PO  Last administered on 10/10/21at 

13:09;  Start 10/9/21 at 09:00;  Stop 10/11/21 at 15:37;  Status DC


Montelukast Sodium (Singulair) 10 mg HS PO  Last administered on 10/12/21at 

22:42;  Start 10/8/21 at 21:00


Non-Formulary Medication (Albuterol Sulfate (Ventolin Hfa Inhaler)) 2 puff PRN 

Q4HRS  PRN INH WHEEZING;  Start 10/8/21 at 15:30;  Status UNV


Diltiazem HCl (Cardizem 24hr Cd) 180 mg DAILY PO  Last administered on 

10/12/21at 09:47;  Start 10/9/21 at 09:00;  Stop 10/12/21 at 14:51;  Status DC


Dorzolamide HCl (Trusopt) 1 drop BID OU  Last administered on 10/13/21at 09:52; 

Start 10/8/21 at 21:00


Furosemide (Lasix) 40 mg DAILY IVP  Last administered on 10/11/21at 11:16;  

Start 10/9/21 at 09:00;  Stop 10/11/21 at 15:37;  Status DC


Insulin Human Lispro (HumaLOG) 0-5 UNITS TIDWMEALS SQ  Last administered on 

10/13/21at 12:39;  Start 10/8/21 at 17:00


Dextrose (Dextrose 50%-Water Syringe) 12.5 gm PRN Q15MIN  PRN IV SEE COMMENTS;  

Start 10/8/21 at 15:30


Linezolid (Zyvox) 600 mg BID PO  Last administered on 10/12/21at 22:42;  Start 

10/8/21 at 21:00;  Stop 10/13/21 at 09:40;  Status DC


Levofloxacin/ Dextrose 150 ml @  100 mls/hr Q48H IV  Last administered on 

10/8/21at 16:45;  Start 10/8/21 at 16:00;  Stop 10/9/21 at 15:59;  Status DC


Timolol Maleate (Timoptic 0.5% Ophth) 1 drop BID OU  Last administered on 

10/13/21at 09:51;  Start 10/8/21 at 21:00


Albuterol Sulfate (Ventolin Neb Soln) 2.5 mg PRN Q4HRS  PRN NEB SHORTNESS OF 

BREATH Last administered on 10/9/21at 03:47;  Start 10/8/21 at 16:00;  Stop 

10/12/21 at 13:28;  Status DC


Info (Anti-Coagulation Monitoring By Pharmacy) 1 each PRN DAILY  PRN MC PER 

PROTOCOL Last administered on 10/9/21at 13:54;  Start 10/8/21 at 16:00;  Stop 

10/12/21 at 11:45;  Status DC


Albuterol Sulfate (Ventolin Hfa) 1 puff PRN Q4HRS  PRN INH WHEEZING;  Start 

10/8/21 at 16:15


Albuterol/ Ipratropium (Combivent Respimat  Mcg) 1 puff RTQID INH  Last 

administered on 10/9/21at 08:55;  Start 10/8/21 at 16:00;  Stop 10/9/21 at 

16:02;  Status DC


Cefepime HCl (Maxipime) 1 gm Q12HR IVP  Last administered on 10/11/21at 11:13;  

Start 10/9/21 at 09:00;  Stop 10/11/21 at 12:27;  Status DC


Lactobacillus Rhamnosus (Culturelle) 1 cap BID PO  Last administered on 

10/13/21at 09:51;  Start 10/9/21 at 21:00


Olanzapine (ZyPREXA) 2.5 mg PRN Q4HRS  PRN PO AGITATION Last administered on 

10/12/21at 22:54;  Start 10/9/21 at 23:00


Cefepime HCl (Maxipime) 1 gm Q24H IVP  Last administered on 10/12/21at 09:46;  

Start 10/12/21 at 09:00;  Stop 10/13/21 at 09:40;  Status DC


Digoxin (Lanoxin) 250 mcg 1X  ONCE IV  Last administered on 10/11/21at 16:49;  

Start 10/11/21 at 15:45;  Stop 10/11/21 at 15:46;  Status DC


Digoxin (Lanoxin) 250 mcg 1X  ONCE IV  Last administered on 10/12/21at 15:32;  

Start 10/12/21 at 15:00;  Stop 10/12/21 at 15:01;  Status DC


Cefdinir (Omnicef) 300 mg BID PO ;  Start 10/13/21 at 21:00


Sodium Chloride 1,000 ml @  75 mls/hr [id number] IV  Last administered on 

10/13/21at 11:28;  Start 10/13/21 at 11:30


Metoprolol Succinate (Toprol Xl) 12.5 mg DAILY PO  Last administered on 

10/13/21at 12:37;  Start 10/13/21 at 13:00


Apixaban (Eliquis) 2.5 mg BID PO  Last administered on 10/13/21at 12:36;  Start 

10/13/21 at 13:00





Active Scripts


Active


Ativan (Lorazepam) 1 Mg Tablet 1 Mg PO PRN BID PRN 7 Days


Hydrocodone-Apap 5-325  ** (Hydrocodone Bit/Acetaminophen) 1 Tab Tablet 1 Tab PO

PRN Q6-8HRS PRN 7 Days


Reported


Diltiazem 24Hr ER (LA) (Diltiazem HCl) 180 Mg Tab.er.24h 1 Tab PO DAILY 30 Days


Xalatan (Latanoprost) 2.5 Ml Drops 1 Drop OU BID


Triamterene-Hctz 37.5-25 Mg Cp (Triamterene/Hydrochlorothiazid) 1 Each Capsule 1

Cap PO DAILY


Ventolin Hfa Inhaler (Albuterol Sulfate) 18 Gm Hfa.aer.ad 2 Puff INH PRN Q4HRS 

PRN


Singulair Tablet ** (Montelukast Sodium) 10 Mg Tablet 10 Mg PO HS


Duoneb 0.5-3(2.5) Mg/3 Ml (Albuterol/Ipratropium) 3 Ml Ampul.neb 3 Ml NEB QID


Lantus Solostar (Insulin Glargine,Hum.rec.anlog) 100 Unit/1 Ml Insuln.pen 30 

Unit SQ QHS


Eliquis (Apixaban) 5 Mg Tablet 5 Mg PO BID


Cozaar ** (Losartan Potassium) 25 Mg Tablet 25 Mg PO DAILY


Cosopt Eye Drops (Dorzolamide Hcl/Timolol Maleat) 10 Ml Drops 1 Drop EACHEYE BID


Atorvastatin Calcium 10 Mg Tablet 10 Mg PO HS


Admelog (Insulin Lispro) 100 Unit/1 Ml Vial 8 Unit SQ TIDAC


Admelog (Insulin Lispro) 100 Unit/1 Ml Vial 0-12 Unit SQ TIDWMEALS


Acetaminophen 325 Mg Tablet 2 Tab PO PRN Q6HRS PRN 30 Days





Allergies


Allergies:  


Coded Allergies:  


     Penicillins (Verified  Allergy, Intermediate, 10/9/21)


   Has tolerated keflex


     ibuprofen (Verified  Allergy, Intermediate, 9/9/21)





ROS


Review of System


UNABLE TO OBTAIN





Physical Exam


General:  Cooperative, No acute distress


HEENT:  Atraumatic, PERRLA, Other (DRY MUCOSA)


Heart:  Regular rate


Abdomen:  Normal bowel sounds


Extremities:  No clubbing


Skin:  No breakdown


Neuro:  Other (CONFUSED. UNABLE TO COOPERATE)


Psych/Mental Status:  Other (FLAT AND CONFUSED AFFECT)


MUSCULOSKELETAL:  No joint tenderness, No deformity, No swelling





Vitals


VITALS





Vital Signs








  Date Time  Temp Pulse Resp B/P (MAP) Pulse Ox O2 Delivery O2 Flow Rate FiO2


 


10/13/21 12:37  111  118/52    


 


10/13/21 12:22     98 Nasal Cannula 2.0 


 


10/13/21 11:00 97.5  18     





 97.5       











Labs


Labs





Laboratory Tests








Test


 10/11/21


14:56 10/11/21


19:26 10/12/21


08:27 10/12/21


11:00


 


Glucose (Fingerstick)


 199 mg/dL


(70-99) 191 mg/dL


(70-99) 180 mg/dL


(70-99) 





 


Sodium Level


 


 


 


 146 mmol/L


(136-145)


 


Potassium Level


 


 


 


 4.2 mmol/L


(3.5-5.1)


 


Chloride Level


 


 


 


 105 mmol/L


()


 


Carbon Dioxide Level


 


 


 


 32 mmol/L


(21-32)


 


Anion Gap    9 (6-14) 


 


Blood Urea Nitrogen


 


 


 


 21 mg/dL


(7-20)


 


Creatinine


 


 


 


 1.8 mg/dL


(0.6-1.0)


 


Estimated GFR


(Cockcroft-Gault) 


 


 


 26.7 





 


Glucose Level


 


 


 


 182 mg/dL


(70-99)


 


Calcium Level


 


 


 


 8.8 mg/dL


(8.5-10.1)


 


Test


 10/12/21


11:36 10/12/21


17:06 10/12/21


20:22 10/13/21


07:44


 


Glucose (Fingerstick)


 196 mg/dL


(70-99) 252 mg/dL


(70-99) 228 mg/dL


(70-99) 220 mg/dL


(70-99)


 


Test


 10/13/21


10:50 10/13/21


11:57 


 





 


Sodium Level


 146 mmol/L


(136-145) 


 


 





 


Potassium Level


 4.3 mmol/L


(3.5-5.1) 


 


 





 


Chloride Level


 110 mmol/L


() 


 


 





 


Carbon Dioxide Level


 32 mmol/L


(21-32) 


 


 





 


Anion Gap 4 (6-14)    


 


Blood Urea Nitrogen


 24 mg/dL


(7-20) 


 


 





 


Creatinine


 1.6 mg/dL


(0.6-1.0) 


 


 





 


Estimated GFR


(Cockcroft-Gault) 30.6 


 


 


 





 


Glucose Level


 208 mg/dL


(70-99) 


 


 





 


Calcium Level


 8.8 mg/dL


(8.5-10.1) 


 


 





 


Glucose (Fingerstick)


 


 168 mg/dL


(70-99) 


 











Laboratory Tests








Test


 10/12/21


17:06 10/12/21


20:22 10/13/21


07:44 10/13/21


10:50


 


Glucose (Fingerstick)


 252 mg/dL


(70-99) 228 mg/dL


(70-99) 220 mg/dL


(70-99) 





 


Sodium Level


 


 


 


 146 mmol/L


(136-145)


 


Potassium Level


 


 


 


 4.3 mmol/L


(3.5-5.1)


 


Chloride Level


 


 


 


 110 mmol/L


()


 


Carbon Dioxide Level


 


 


 


 32 mmol/L


(21-32)


 


Anion Gap    4 (6-14) 


 


Blood Urea Nitrogen


 


 


 


 24 mg/dL


(7-20)


 


Creatinine


 


 


 


 1.6 mg/dL


(0.6-1.0)


 


Estimated GFR


(Cockcroft-Gault) 


 


 


 30.6 





 


Glucose Level


 


 


 


 208 mg/dL


(70-99)


 


Calcium Level


 


 


 


 8.8 mg/dL


(8.5-10.1)


 


Test


 10/13/21


11:57 


 


 





 


Glucose (Fingerstick)


 168 mg/dL


(70-99) 


 


 














Assessment/Plan


Assessment/Plan


IMP





DEHDRATION


HYPERNATREMIA


OSBALDO-MILD WITH CR OF 1.9


CKD STAGE 3B - BASELINE CR AVG OF 1.5


DEMENTIA


ACUTE MET ENCEPHALOPATHY


ASP PNEUMONIA


PLEURAL EFFUSION


S/P THORACENTESIS


DM II


HTN HX





PLAN





HYDRATION


HOLD ARB


HOLD DIURETICS


ENC PO


MAY NEED HYPERALIMENTATION


D/W ATTENDING











DEMAR COLLINS MD                 Oct 13, 2021 14:57

## 2021-10-13 NOTE — NUR
SS following for discharge planning. SS reviewed pt chart and discussed with pt RN. Pt is 
skilled rehabilitation resident from [place], [phone]; fax [phone]. Pt is 
currently requiring oxygen at two liters nasal canula. PO diet. Nephrology consulted. Pt 
alert to self. SS phoned phoned and faxed referral to [place]. COVID19 test pending for 
placement. SS will continue to follow for discharge planning.

## 2021-10-13 NOTE — PDOC
ESTELITA ARMIJO APRN 10/13/21 1153:


CARDIO Progress Notes


Date and Time


Date of Service


10/13/2021


Time of Evaluation


1010





Subjective


Subjective:  No Chest Pain, No shortness of breath, No Palpitations





Vitals


Vitals





Vital Signs








  Date Time  Temp Pulse Resp B/P (MAP) Pulse Ox O2 Delivery O2 Flow Rate FiO2


 


10/13/21 07:58     97 Nasal Cannula 2.0 


 


10/13/21 07:00 97.0 93 16 99/46 (63)    





 97.0       








Weight


Weight [ ]





Input and Output


Intake and Output











Intake and Output 


 


 10/13/21





 07:00


 


Intake Total 600 ml


 


Balance 600 ml


 


 


 


Intake Oral 600 ml


 


# Voids 2


 


# Bowel Movements 1











Laboratory


Labs





Laboratory Tests








Test


 10/12/21


17:06 10/12/21


20:22 10/13/21


07:44 10/13/21


10:50


 


Glucose (Fingerstick)


 252 mg/dL


(70-99) 228 mg/dL


(70-99) 220 mg/dL


(70-99) 





 


Sodium Level


 


 


 


 146 mmol/L


(136-145)


 


Potassium Level


 


 


 


 4.3 mmol/L


(3.5-5.1)


 


Chloride Level


 


 


 


 110 mmol/L


()


 


Carbon Dioxide Level


 


 


 


 32 mmol/L


(21-32)


 


Anion Gap    4 (6-14) 


 


Blood Urea Nitrogen


 


 


 


 24 mg/dL


(7-20)


 


Creatinine


 


 


 


 1.6 mg/dL


(0.6-1.0)


 


Estimated GFR


(Cockcroft-Gault) 


 


 


 30.6 





 


Glucose Level


 


 


 


 208 mg/dL


(70-99)


 


Calcium Level


 


 


 


 8.8 mg/dL


(8.5-10.1)











Microbiology


Micro





Microbiology


10/11/21 Gram Stain - Final, Resulted


           


10/11/21 Aerobic and Anaerobic Culture - Preliminary, Resulted


           


10/10/21 Gram Stain Evaluation - Final, Complete


           


10/10/21 Respiratory Culture - Final, Complete





Physical Exam


HEENT:  Neck Supple W Full Motion


Chest:  Symmetric


LUNGS:  Other (diminished bases)


Heart:  irregularly irregular (AFIB)


Abdomen:  Soft N/T


Extremities:  No Edema


Neurology:  confused, other (lethargic)





Assessment


Assessment


1.  Acute respiratory failure with CHF and probable aspiration PNA. CT chest 

with moderate bilateral pleural effusions.


2.  Acute on chronic diastolic CHF; better after right sided thoracentesis, 

better


3.  PAFIB with intermittent RVR[facility]  [facility] 

[facility]


4.  Hypertension; low end. 


5.  Hyperlipidmia; statin 


6.  Diabetes, II


7.  OSBALDO on CKD


8.  Dementia 


9.  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





Recommendations


Low dose toprol. Dig PRN


Discontinue losartan for now


Lasix PRN


Ongoing antibiotic therapy as per ID.


Restart low dose eliquis for AFIB and prior hx of DVT





Justicifation of Admission Dx:


Justifications for Admission:


Justification of Admission Dx:  Yes





ADAL MCHUGH MD 10/13/21 2037:


CARDIO Progress Notes


Assessment


Assessment


Patient seen and examined.  Agree with NP's assessment and plan.


AF with episodes of RVR - better controlled.  Continue BB and eliquis


s/p thoracentesis feeling better


Ac on chr diast HF better compensated


Continue antibiotics per ID team











ESTELITA ARMIJO          Oct 13, 2021 11:53


ADAL MCHUGH MD           Oct 13, 2021 20:37

## 2021-10-13 NOTE — NUR
Non administered patients 0800 dose of insulin due to patient asleep and not wanting to wake 
up to eat.

## 2021-10-14 VITALS — SYSTOLIC BLOOD PRESSURE: 125 MMHG | DIASTOLIC BLOOD PRESSURE: 69 MMHG

## 2021-10-14 VITALS — SYSTOLIC BLOOD PRESSURE: 113 MMHG | DIASTOLIC BLOOD PRESSURE: 58 MMHG

## 2021-10-14 VITALS — SYSTOLIC BLOOD PRESSURE: 111 MMHG | DIASTOLIC BLOOD PRESSURE: 54 MMHG

## 2021-10-14 LAB
ANION GAP SERPL CALC-SCNC: 9 MMOL/L (ref 6–14)
BUN SERPL-MCNC: 18 MG/DL (ref 7–20)
CALCIUM SERPL-MCNC: 8.6 MG/DL (ref 8.5–10.1)
CHLORIDE SERPL-SCNC: 110 MMOL/L (ref 98–107)
CO2 SERPL-SCNC: 28 MMOL/L (ref 21–32)
CREAT SERPL-MCNC: 1.4 MG/DL (ref 0.6–1)
GFR SERPLBLD BASED ON 1.73 SQ M-ARVRAT: 35.7 ML/MIN
GLUCOSE SERPL-MCNC: 202 MG/DL (ref 70–99)
POTASSIUM SERPL-SCNC: 4.3 MMOL/L (ref 3.5–5.1)
SODIUM SERPL-SCNC: 147 MMOL/L (ref 136–145)

## 2021-10-14 RX ADMIN — LATANOPROST SCH DROP: 50 SOLUTION OPHTHALMIC at 08:57

## 2021-10-14 RX ADMIN — IPRATROPIUM BROMIDE AND ALBUTEROL SULFATE SCH ML: .5; 3 SOLUTION RESPIRATORY (INHALATION) at 07:20

## 2021-10-14 RX ADMIN — Medication SCH CAP: at 08:57

## 2021-10-14 RX ADMIN — INSULIN LISPRO SCH UNITS: 100 INJECTION, SOLUTION INTRAVENOUS; SUBCUTANEOUS at 12:56

## 2021-10-14 RX ADMIN — METOPROLOL SUCCINATE SCH MG: 25 TABLET, EXTENDED RELEASE ORAL at 08:57

## 2021-10-14 RX ADMIN — INSULIN LISPRO SCH UNITS: 100 INJECTION, SOLUTION INTRAVENOUS; SUBCUTANEOUS at 08:00

## 2021-10-14 RX ADMIN — DORZOLAMIDE HYDROCHLORIDE SCH DROP: 20 SOLUTION/ DROPS OPHTHALMIC at 08:57

## 2021-10-14 RX ADMIN — CEFDINIR SCH MG: 300 CAPSULE ORAL at 08:57

## 2021-10-14 RX ADMIN — BACITRACIN SCH MLS/HR: 5000 INJECTION, POWDER, FOR SOLUTION INTRAMUSCULAR at 01:38

## 2021-10-14 RX ADMIN — APIXABAN SCH MG: 2.5 TABLET, FILM COATED ORAL at 08:57

## 2021-10-14 RX ADMIN — TIMOLOL MALEATE SCH DROP: 5 SOLUTION/ DROPS OPHTHALMIC at 08:57

## 2021-10-14 RX ADMIN — IPRATROPIUM BROMIDE AND ALBUTEROL SULFATE SCH ML: .5; 3 SOLUTION RESPIRATORY (INHALATION) at 11:26

## 2021-10-14 NOTE — SNU/HH DC
DISCHARGE ORDERS


DISCHARGE INFORMATION:


DISCHARGE DATE:  Oct 14, 2021


FINAL DIAGNOSIS


acute hypoxic resp. failure


aspiration pneumonia


A/C diastolic CHF


CONDITION ON DISCHARGE:  Stable





CODE STATUS:


Code Status:  DNR/DNI





SKILLED NURSING:


SNF STAY <30 DAYS:  Yes





POST DISCHARGE ORDERS:


ACTIVITY ORDERS:  Activity as tolerated


WEIGHT BEARING STATUS:  Touch down weight bearing


BATHING ORDERS:  Shower-keep dressing dry, No Tub Bath until see 


DIET AFTER DISCHARGE:  ADA


WOUND/INCISION CARE:  Keep wound/cast CDI, Change dressing





CHECKS AFTER DISCHARGE:


CHECKS AFTER DISCHARGE:  Check blood sugar, ac/hs





TREATMENT/EQUIPMENT ORDERS:


ADAPTIVE EQUIPMENT NEEDED:  Front wheeled walker


Physical Therapy For:  Evalulation/Treatment


Occupational Therapy For:  Evaluation/Treatment





DISCHARGE MEDICATIONS:


Home Meds


Active Scripts


Cefdinir (CEFDINIR) 300 Mg Capsule, 1 CAP PO BID for pneumonia for 5 Days, #10 

CAP


   Prov:FARHAN ANDERSON MD         10/14/21


Lorazepam (ATIVAN) 1 Mg Tablet, 1 MG PO BID for anxiety for 30 Days, #60 TAB 5 

Refills


   Prov:FARHAN ANDERSON MD         10/14/21


Hydrocodone Bit/Acetaminophen (HYDROCODONE-APAP 5-325  **) 1 Tab Tablet, 1 TAB 

PO PRN Q6HRS PRN for PAIN for 30 Days, #120 TAB 0 Refills


   Prov:FARHAN ANDERSON MD         10/14/21


Reported Medications


Diltiazem HCl (Diltiazem 24Hr ER (LA)) 180 Mg Tab.er.24h, 1 TAB PO DAILY for 

afib for 30 Days, #30 TAB 0 Refills


   9/10/21


Latanoprost (XALATAN) 2.5 Ml Drops, 1 DROP OU BID for GLAUCOMA, ML


   9/8/21


Triamterene/Hydrochlorothiazid (TRIAMTERENE-HCTZ 37.5-25 MG CP) 1 Each Capsule, 

1 CAP PO DAILY for HTN, #30 CAP 5 Refills


   9/8/21


Albuterol Sulfate (VENTOLIN HFA INHALER) 18 Gm Hfa.aer.ad, 2 PUFF INH PRN Q4HRS 

PRN for WHEEZING, EACH 0 Refills


   9/8/21


Montelukast Sodium (SINGULAIR TABLET **) 10 Mg Tablet, 10 MG PO HS for FOR 

ASTHMA, TAB 0 Refills


   9/8/21


Ipratropium/Albuterol Sulfate (DUONEB 0.5-3(2.5) MG/3 ML) 3 Ml Ampul.neb, 3 ML 

NEB QID for ASTHMA, EACH


   9/8/21


Insulin Glargine,Hum.rec.anlog (LANTUS SOLOSTAR) 100 Unit/1 Ml Insuln.pen, 30 

UNIT SQ QHS for DM, #15 ML 3 Refills


   9/8/21


Apixaban (ELIQUIS) 5 Mg Tablet, 5 MG PO BID for AFIB, TAB


   9/8/21


Losartan Potassium (COZAAR **) 25 Mg Tablet, 25 MG PO DAILY for HYPERTENSION, 

TAB


   9/8/21


Dorzolamide Hcl/Timolol Maleat (COSOPT EYE DROPS) 10 Ml Drops, 1 DROP EACHEYE 

BID for GLAUCOMA, #10 ML 0 Refills


   9/8/21


Atorvastatin Calcium (ATORVASTATIN CALCIUM) 10 Mg Tablet, 10 MG PO HS for FOR 

CHOLESTEROL, #30 TAB 0 Refills


   9/8/21


Insulin Lispro (Admelog) 100 Unit/1 Ml Vial, 8 UNIT SQ TIDAC for DM, EACH


   9/8/21


Insulin Lispro (Admelog) 100 Unit/1 Ml Vial, 0-12 UNIT SQ TIDWMEALS for DM, EACH


   9/8/21


Acetaminophen (ACETAMINOPHEN) 325 Mg Tablet, 2 TAB PO PRN Q6HRS PRN for MILD 

PAIN / TEMP > 100.3'F for 30 Days, #30 TAB 0 Refills


   9/8/21


Discontinued Scripts


Lorazepam (ATIVAN) 1 Mg Tablet, 1 MG PO PRN BID PRN for ANXIETY / AGITATION for 

7 Days, #14 TAB 0 Refills


   Prov:TERESA FREEMAN MD         9/13/21


Hydrocodone Bit/Acetaminophen (HYDROCODONE-APAP 5-325  **) 1 Tab Tablet, 1 TAB 

PO PRN Q6-8HRS PRN for PAIN for 7 Days, #28 TAB 0 Refills


   Prov:TERESA FREEMAN MD         9/13/21











FARHAN ANDERSON MD                Oct 14, 2021 09:14

## 2021-10-14 NOTE — PDOC
Infectious Disease Note


Subjective


Subjective


Patient alert


pleasantly confused which appears to be baseline


On O2 by nasal cannula





ROS


ROS


No nausea vomiting diarrhea or fever





Vital Sign


Vital Signs





Vital Signs








  Date Time  Temp Pulse Resp B/P (MAP) Pulse Ox O2 Delivery O2 Flow Rate FiO2


 


10/14/21 07:30     93 Nasal Cannula 2.0 


 


10/14/21 03:43 98.5 110 18 125/69 (87)    





 98.5       











Physical Exam


PHYSICAL EXAM


GENERAL: Patient alert awake confused appears comfortable, nontoxic-appearing


HEENT:  Normocephalic, atraumatic.  Anicteric.  No thrush.  Oral mucosa moist.


NECK:  Supple.


LUNGS:  Decreased breath sounds at the bases.  No accessory muscle use.  No 


wheezing.


HEART:  S1, S2.  Irregular no murmurs.


ABDOMEN:  Soft, nontender, nondistended.


EXTREMITIES:  No edema, no cyanosis.


DERMATOLOGIC:  Warm, dry, no generalized rash.


NEUROLOGIC:  Lethargic, sleepy, arousable.


PSYCHIATRIC:  Unable to assess.





Labs


Lab





Laboratory Tests








Test


 10/13/21


10:50 10/13/21


11:57 10/13/21


17:11 10/13/21


20:04


 


Sodium Level


 146 mmol/L


(136-145) 


 


 





 


Potassium Level


 4.3 mmol/L


(3.5-5.1) 


 


 





 


Chloride Level


 110 mmol/L


() 


 


 





 


Carbon Dioxide Level


 32 mmol/L


(21-32) 


 


 





 


Anion Gap 4 (6-14)    


 


Blood Urea Nitrogen


 24 mg/dL


(7-20) 


 


 





 


Creatinine


 1.6 mg/dL


(0.6-1.0) 


 


 





 


Estimated GFR


(Cockcroft-Gault) 30.6 


 


 


 





 


Glucose Level


 208 mg/dL


(70-99) 


 


 





 


Calcium Level


 8.8 mg/dL


(8.5-10.1) 


 


 





 


Glucose (Fingerstick)


 


 168 mg/dL


(70-99) 193 mg/dL


(70-99) 168 mg/dL


(70-99)


 


Test


 10/14/21


08:05 


 


 





 


Glucose (Fingerstick)


 197 mg/dL


(70-99) 


 


 











Micro


Fluid culture is negative





Objective


Assessment


1.  Acute hypoxic respiratory failure, likely aspiration pneumonia.


     CT chest noted


2.  Underlying chronic obstructive pulmonary disease and mild intermittent 


asthma.


3.  Encephalopathy with underlying dementia.


4.  Chronic kidney disease.


5.  Diabetes mellitus with neuropathy.


6.  Right ankle ulcer.


7.  CHF, atrial fibrillation 


8  History of ALLERGIES TO PENICILLIN intermediate, has tolerated Keflex per 


pharmacy.





Plan


Plan of Care


Cefdinir p.o. for discharge 


Monitor labs and cultures


Maintain aspiration precaution


Continue supportive care











MIHAELA LAMB MD               Oct 14, 2021 08:12

## 2021-10-14 NOTE — PDOC
ESTELITA ARMIJO APRN 10/14/21 1118:


CARDIO Progress Notes


Date and Time


Date of Service


10/14/2021


Time of Evaluation


0940





Subjective


Subjective:  No Chest Pain, No shortness of breath, No Palpitations





Vitals


Vitals





Vital Signs








  Date Time  Temp Pulse Resp B/P (MAP) Pulse Ox O2 Delivery O2 Flow Rate FiO2


 


10/14/21 10:57  113  113/58    


 


10/14/21 07:30     93 Nasal Cannula 2.0 


 


10/14/21 07:00 97.1  18     





 97.1       








Weight


Weight [ ]





Input and Output


Intake and Output











Intake and Output 


 


 10/14/21





 07:00


 


 


 


# Voids 3


 


# Bowel Movements 2











Laboratory


Labs





Laboratory Tests








Test


 10/13/21


11:34 10/13/21


11:57 10/13/21


17:11 10/13/21


20:04


 


SARS-CoV-2 RNA (SHANNA)


 Negative


(Negative) 


 


 





 


Glucose (Fingerstick)


 


 168 mg/dL


(70-99) 193 mg/dL


(70-99) 168 mg/dL


(70-99)


 


Test


 10/14/21


06:45 10/14/21


08:05 


 





 


Sodium Level


 147 mmol/L


(136-145) 


 


 





 


Potassium Level


 4.3 mmol/L


(3.5-5.1) 


 


 





 


Chloride Level


 110 mmol/L


() 


 


 





 


Carbon Dioxide Level


 28 mmol/L


(21-32) 


 


 





 


Anion Gap 9 (6-14)    


 


Blood Urea Nitrogen


 18 mg/dL


(7-20) 


 


 





 


Creatinine


 1.4 mg/dL


(0.6-1.0) 


 


 





 


Estimated GFR


(Cockcroft-Gault) 35.7 


 


 


 





 


Glucose Level


 202 mg/dL


(70-99) 


 


 





 


Calcium Level


 8.6 mg/dL


(8.5-10.1) 


 


 





 


Glucose (Fingerstick)


 


 197 mg/dL


(70-99) 


 














Microbiology


Micro





Microbiology


10/11/21 Gram Stain - Final, Resulted


           


10/11/21 Aerobic and Anaerobic Culture - Preliminary, Resulted


           


10/10/21 Gram Stain Evaluation - Final, Complete


           


10/10/21 Respiratory Culture - Final, Complete





Physical Exam


HEENT:  Neck Supple W Full Motion


Chest:  Symmetric


LUNGS:  Other (diminished bases)


Heart:  irregularly irregular (AFIB)


Abdomen:  Soft N/T


Extremities:  No Edema


Neurology:  confused





Assessment


Assessment


1.  Acute respiratory failure with CHF and probable aspiration PNA. CT chest 

with moderate bilateral pleural effusions.


2.  Acute on chronic diastolic CHF; better after right sided thoracentesis, 

better


3.  PAFIB with intermittent RVR.  Follows with [facility],Dr. Price. rate 

better


4.  Hypertension; low end. 


5.  Hyperlipidmia; statin 


6.  Diabetes, II


7.  OSBALDO on CKD


8.  Dementia 


9.  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





Recommendations


Low dose toprol. Dig QOD. Low dose eliquis for stroke prevention


Lasix PRN


Ongoing antibiotic therapy as per ID.


SNU


Poor long term prognosis


Follow up with Dr. Price





Justicifation of Admission Dx:


Justifications for Admission:


Justification of Admission Dx:  Yes





ADAL MCHUGH MD 10/14/21 2018:


CARDIO Progress Notes


Assessment


Assessment


Patient seen and examined.  Agree with NP's assessment and plan.


AF with episodes of RVR - better controlled.  Continue BB and eliquis


s/p thoracentesis feeling better


Ac on chr diast HF better compensated


Continue antibiotics per ID team











ESTELITA ARMIJO          Oct 14, 2021 11:18


ADAL MCHUGH MD           Oct 14, 2021 20:18

## 2021-10-14 NOTE — NUR
SS following up with discharge planning. SS reviewed pt chart and discussed with pt RN. Pt 
is skilled rehabilitation resident from [facility], [phone]; fax [phone]. Pt is 
currently requiring oxygen at two liters nasal canula. COVID19 negative. PT/OT recommended 
skilled nursing unit. Pt accepted at [facility]. Discharge orders received and phoned and 
faxed to [facility]. Pt will discharge today and return to [facility]. SS currently awaiting 
transport time from facility. Pt's RN and pt's spouse notified. SS will continue to follow 
for discharge planning. 

-------------------------------------------------------------------------------

Addendum: 10/14/21 at 1142 by LEE ANN SCHERER 

-------------------------------------------------------------------------------

Pt will discharge today and go to [facility] between 1330 and 1400. [facility] to provide 
transportation. Pt's RN notified.

## 2021-10-14 NOTE — PDOC
DATE OF SERVICE


DATE: 10/14/21 


TIME: 09:58





SUBJECTIVE


ROS


Stable, No complaints





OBJECTIVE


Vital Signs





Vital Signs








  Date Time  Temp Pulse Resp B/P (MAP) Pulse Ox O2 Delivery O2 Flow Rate FiO2


 


10/14/21 08:57  113  113/58    


 


10/14/21 07:30     93 Nasal Cannula 2.0 


 


10/14/21 07:00 97.1  18     





 97.1       








I & 0











Intake and Output 


 


 10/14/21





 07:00


 


 


 


# Voids 3


 


# Bowel Movements 2











PHYSICAL EXAM


Physical Exam


GENERAL   NAD  


HEENT:  Normocephalic, atraumatic.  Anicteric.  No thrush.  Oral mucosa moist.


NECK:  Supple.


LUNGS:  Decreased breath sounds at  bases.  No accessory muscle use.   


HEART:  S1, S2.  Irregular no murmurs.


ABDOMEN:  Soft, nontender, nondistended.


EXTREMITIES:  No edema,  


DERMATOLOGIC:  no generalized rash.


NEUROLOGIC:   sleepy, arousable.





DIAGNOSIS/ASSESSMENT


Assessment & Plan


OSBALDO - Improving, Creat from 1.8--> 1.4  , 2/2 dehydration  , Non Oliguric. ARB 

and Diuretics held.  Supportive care, maintain hydration, avoid nephrotoxins  





CKD stage 3 B- baseline Creat  ~ 1.5





HyperNatremia- Mild , encourage PO water intake  .. switch to  hypotonic IVF


 


Acute hypoxic respiratory failure, likely aspiration pneumonia 





Bilat Pl Effusion  S/P Thoracentesis  





 Diabetes mellitus 





Right ankle ulcer.





Hx of CHF, atrial fibrillation 





Dementia





COMMENT/RELEVANT DATA


Meds





Current Medications








 Medications


  (Trade)  Dose


 Ordered  Sig/Surendra  Start Time


 Stop Time Status Last Admin


Dose Admin


 


 Acetaminophen


  (Tylenol)  650 mg  PRN Q6HRS  PRN  10/8/21 15:30


     





 


 Albuterol Sulfate


  (Ventolin Hfa)  1 puff  PRN Q4HRS  PRN  10/8/21 16:15


     





 


 Albuterol Sulfate


  (Ventolin Neb


 Soln)  2.5 mg  PRN Q4HRS  PRN  10/8/21 16:00


 10/12/21 13:28 DC 10/9/21 03:47


2.5 MG


 


 Albuterol/


 Ipratropium


  (Combivent


 Respimat 


 Mcg)  1 puff  RTQID  10/8/21 16:00


 10/9/21 16:02 DC 10/9/21 08:55


1 PUFF


 


 Albuterol/


 Ipratropium


  (Duoneb)  3 ml  RTQID  10/8/21 16:00


    10/14/21 07:20


3 ML


 


 Apixaban


  (Eliquis)  2.5 mg  BID  10/13/21 13:00


    10/14/21 08:57


2.5 MG


 


 Ascorbic Acid


  (Vitamin C)  500 mg  DAILY  10/14/21 09:00


    10/14/21 08:57


500 MG


 


 Atorvastatin


 Calcium


  (Lipitor)  10 mg  HS  10/8/21 21:00


    10/13/21 21:51


10 MG


 


 Cefdinir


  (Omnicef)  300 mg  BID  10/13/21 21:00


    10/14/21 08:57


300 MG


 


 Cefepime HCl


  (Maxipime)  1 gm  Q24H  10/12/21 09:00


 10/13/21 09:40 DC 10/12/21 09:46


1 GM


 


 Dextrose


  (Dextrose


 50%-Water Syringe)  12.5 gm  PRN Q15MIN  PRN  10/8/21 15:30


     





 


 Digoxin


  (Lanoxin)  125 mcg  QODAY  10/16/21 09:00


     





 


 Diltiazem HCl


  (Cardizem 24hr


 Cd)  180 mg  DAILY  10/9/21 09:00


 10/12/21 14:51 DC 10/12/21 09:47


180 MG


 


 Dorzolamide HCl


  (Trusopt)  1 drop  BID  10/8/21 21:00


    10/14/21 08:57


1 DROP


 


 Furosemide


  (Lasix)  40 mg  DAILY  10/9/21 09:00


 10/11/21 15:37 DC 10/11/21 11:16


40 MG


 


 Info


  (Anti-Coagulation


 Monitoring By


 Pharmacy)  1 each  PRN DAILY  PRN  10/8/21 16:00


 10/12/21 11:45 DC 10/9/21 13:54


1 EACH


 


 Insulin Human


 Lispro


  (HumaLOG)  0-5 UNITS  TIDWMEALS  10/8/21 17:00


    10/13/21 12:39


2 UNITS


 


 Lactobacillus


 Rhamnosus


  (Culturelle)  1 cap  BID  10/9/21 21:00


    10/14/21 08:57


1 CAP


 


 Latanoprost


  (Xalatan)  1 drop  BID  10/8/21 21:00


    10/14/21 08:57


1 DROP


 


 Levofloxacin/


 Dextrose  150 ml @ 


 100 mls/hr  Q48H  10/8/21 16:00


 10/9/21 15:59 DC 10/8/21 16:45


100 MLS/HR


 


 Linezolid


  (Zyvox)  600 mg  BID  10/8/21 21:00


 10/13/21 09:40 DC 10/12/21 22:42


600 MG


 


 Losartan Potassium


  (Cozaar)  25 mg  DAILY  10/9/21 09:00


 10/11/21 15:37 DC 10/10/21 13:09


25 MG


 


 Metoprolol


 Succinate


  (Toprol Xl)  12.5 mg  1X  ONCE  10/14/21 09:30


 10/14/21 09:32 DC  





 


 Montelukast Sodium


  (Singulair)  10 mg  HS  10/8/21 21:00


    10/13/21 21:51


10 MG


 


 Multivitamins


  (Thera M Plus)  1 tab  DAILY  10/14/21 09:00


    10/14/21 08:57


1 TAB


 


 Non-Formulary


 Medication


  (Albuterol


 Sulfate (Ventolin


 Hfa Inhaler))  2 puff  PRN Q4HRS  PRN  10/8/21 15:30


   UNV  





 


 Olanzapine


  (ZyPREXA)  2.5 mg  PRN Q4HRS  PRN  10/9/21 23:00


    10/13/21 21:51


2.5 MG


 


 Sodium Chloride  1,000 ml @ 


 75 mls/hr  [id number]  10/13/21 11:30


    10/14/21 01:38


75 MLS/HR


 


 Timolol Maleate


  (Timoptic 0.5%


 Ophth)  1 drop  BID  10/8/21 21:00


    10/14/21 08:57


1 DROP








Lab





Laboratory Tests








Test


 10/13/21


10:50 10/13/21


11:57 10/13/21


17:11 10/13/21


20:04


 


Sodium Level


 146 mmol/L


(136-145) 


 


 





 


Potassium Level


 4.3 mmol/L


(3.5-5.1) 


 


 





 


Chloride Level


 110 mmol/L


() 


 


 





 


Carbon Dioxide Level


 32 mmol/L


(21-32) 


 


 





 


Anion Gap 4 (6-14)    


 


Blood Urea Nitrogen


 24 mg/dL


(7-20) 


 


 





 


Creatinine


 1.6 mg/dL


(0.6-1.0) 


 


 





 


Estimated GFR


(Cockcroft-Gault) 30.6 


 


 


 





 


Glucose Level


 208 mg/dL


(70-99) 


 


 





 


Calcium Level


 8.8 mg/dL


(8.5-10.1) 


 


 





 


Glucose (Fingerstick)


 


 168 mg/dL


(70-99) 193 mg/dL


(70-99) 168 mg/dL


(70-99)


 


Test


 10/14/21


06:45 10/14/21


08:05 


 





 


Sodium Level


 147 mmol/L


(136-145) 


 


 





 


Potassium Level


 4.3 mmol/L


(3.5-5.1) 


 


 





 


Chloride Level


 110 mmol/L


() 


 


 





 


Carbon Dioxide Level


 28 mmol/L


(21-32) 


 


 





 


Anion Gap 9 (6-14)    


 


Blood Urea Nitrogen


 18 mg/dL


(7-20) 


 


 





 


Creatinine


 1.4 mg/dL


(0.6-1.0) 


 


 





 


Estimated GFR


(Cockcroft-Gault) 35.7 


 


 


 





 


Glucose Level


 202 mg/dL


(70-99) 


 


 





 


Calcium Level


 8.6 mg/dL


(8.5-10.1) 


 


 





 


Glucose (Fingerstick)


 


 197 mg/dL


(70-99) 


 











Results


All relevant outside records, renal labs, imaging studies, telemetry/EKG's were 

reviewed.





Justicifation of Admission Dx:


Justifications for Admission:


Justification of Admission Dx:  Yes











CHINYERE GAMINO MD                Oct 14, 2021 10:06

## 2021-10-14 NOTE — NUR
Pt left unit at 1320 by wheelchair via transportation. Pt's IV removed, VSS. Discharge 
paperwork sent with pt, report called to [facility] at [phone]. Wound photos 
not taken, camera not working properly. Dressing to R ankle changed.

## 2021-10-14 NOTE — NUR
This RN nonadministered dose of insulin this am. Pt sleeping, did not eat breakfast. Will 
reassess at lunch and dose accordingly.

## 2021-10-14 NOTE — PDOC
PULMONARY PROGRESS NOTES


DATE: 10/14/21 


TIME: 09:11


Subjective


No signs of respiratory distress patient 


Appears to be less cough


Vitals





Vital Signs








  Date Time  Temp Pulse Resp B/P (MAP) Pulse Ox O2 Delivery O2 Flow Rate FiO2


 


10/14/21 08:57  113  113/58    


 


10/14/21 07:30     93 Nasal Cannula 2.0 


 


10/14/21 07:00 97.1  18     





 97.1       








Lungs:  Clear, Other (Diminished breath sounds in the base)


Cardiovascular:  S1, S2


Abdomen:  Soft


Neuro Exam:  Alert


Extremities:  No Edema


Skin:  Warm


Labs





Laboratory Tests








Test


 10/12/21


11:00 10/12/21


11:36 10/12/21


17:06 10/12/21


20:22


 


Sodium Level


 146 mmol/L


(136-145) 


 


 





 


Potassium Level


 4.2 mmol/L


(3.5-5.1) 


 


 





 


Chloride Level


 105 mmol/L


() 


 


 





 


Carbon Dioxide Level


 32 mmol/L


(21-32) 


 


 





 


Anion Gap 9 (6-14)    


 


Blood Urea Nitrogen


 21 mg/dL


(7-20) 


 


 





 


Creatinine


 1.8 mg/dL


(0.6-1.0) 


 


 





 


Estimated GFR


(Cockcroft-Gault) 26.7 


 


 


 





 


Glucose Level


 182 mg/dL


(70-99) 


 


 





 


Calcium Level


 8.8 mg/dL


(8.5-10.1) 


 


 





 


Glucose (Fingerstick)


 


 196 mg/dL


(70-99) 252 mg/dL


(70-99) 228 mg/dL


(70-99)


 


Test


 10/13/21


07:44 10/13/21


10:50 10/13/21


11:57 10/13/21


17:11


 


Glucose (Fingerstick)


 220 mg/dL


(70-99) 


 168 mg/dL


(70-99) 193 mg/dL


(70-99)


 


Sodium Level


 


 146 mmol/L


(136-145) 


 





 


Potassium Level


 


 4.3 mmol/L


(3.5-5.1) 


 





 


Chloride Level


 


 110 mmol/L


() 


 





 


Carbon Dioxide Level


 


 32 mmol/L


(21-32) 


 





 


Anion Gap  4 (6-14)   


 


Blood Urea Nitrogen


 


 24 mg/dL


(7-20) 


 





 


Creatinine


 


 1.6 mg/dL


(0.6-1.0) 


 





 


Estimated GFR


(Cockcroft-Gault) 


 30.6 


 


 





 


Glucose Level


 


 208 mg/dL


(70-99) 


 





 


Calcium Level


 


 8.8 mg/dL


(8.5-10.1) 


 





 


Test


 10/13/21


20:04 10/14/21


06:45 10/14/21


08:05 





 


Glucose (Fingerstick)


 168 mg/dL


(70-99) 


 197 mg/dL


(70-99) 





 


Sodium Level


 


 147 mmol/L


(136-145) 


 





 


Potassium Level


 


 4.3 mmol/L


(3.5-5.1) 


 





 


Chloride Level


 


 110 mmol/L


() 


 





 


Carbon Dioxide Level


 


 28 mmol/L


(21-32) 


 





 


Anion Gap  9 (6-14)   


 


Blood Urea Nitrogen


 


 18 mg/dL


(7-20) 


 





 


Creatinine


 


 1.4 mg/dL


(0.6-1.0) 


 





 


Estimated GFR


(Cockcroft-Gault) 


 35.7 


 


 





 


Glucose Level


 


 202 mg/dL


(70-99) 


 





 


Calcium Level


 


 8.6 mg/dL


(8.5-10.1) 


 











Laboratory Tests








Test


 10/13/21


10:50 10/13/21


11:57 10/13/21


17:11 10/13/21


20:04


 


Sodium Level


 146 mmol/L


(136-145) 


 


 





 


Potassium Level


 4.3 mmol/L


(3.5-5.1) 


 


 





 


Chloride Level


 110 mmol/L


() 


 


 





 


Carbon Dioxide Level


 32 mmol/L


(21-32) 


 


 





 


Anion Gap 4 (6-14)    


 


Blood Urea Nitrogen


 24 mg/dL


(7-20) 


 


 





 


Creatinine


 1.6 mg/dL


(0.6-1.0) 


 


 





 


Estimated GFR


(Cockcroft-Gault) 30.6 


 


 


 





 


Glucose Level


 208 mg/dL


(70-99) 


 


 





 


Calcium Level


 8.8 mg/dL


(8.5-10.1) 


 


 





 


Glucose (Fingerstick)


 


 168 mg/dL


(70-99) 193 mg/dL


(70-99) 168 mg/dL


(70-99)


 


Test


 10/14/21


06:45 10/14/21


08:05 


 





 


Sodium Level


 147 mmol/L


(136-145) 


 


 





 


Potassium Level


 4.3 mmol/L


(3.5-5.1) 


 


 





 


Chloride Level


 110 mmol/L


() 


 


 





 


Carbon Dioxide Level


 28 mmol/L


(21-32) 


 


 





 


Anion Gap 9 (6-14)    


 


Blood Urea Nitrogen


 18 mg/dL


(7-20) 


 


 





 


Creatinine


 1.4 mg/dL


(0.6-1.0) 


 


 





 


Estimated GFR


(Cockcroft-Gault) 35.7 


 


 


 





 


Glucose Level


 202 mg/dL


(70-99) 


 


 





 


Calcium Level


 8.6 mg/dL


(8.5-10.1) 


 


 





 


Glucose (Fingerstick)


 


 197 mg/dL


(70-99) 


 











Medications





Active Scripts








 Medications  Dose


 Route/Sig


 Max Daily Dose Days Date Category


 


 Ativan


  (Lorazepam) 1 Mg


 Tablet  1 Mg


 PO PRN BID PRN


  7 9/13/21 Rx


 


 Hydrocodone-Apap


 5-325  **


  (Hydrocodone


 Bit/Acetaminophen)


 1 Tab Tablet  1 Tab


 PO PRN Q6-8HRS PRN


  7 9/13/21 Rx


 


 Diltiazem 24Hr ER


  (LA) (Diltiazem


 HCl) 180 Mg


 Tab.er.24h  1 Tab


 PO DAILY


  30 9/10/21 Reported


 


 Xalatan


  (Latanoprost) 2.5


 Ml Drops  1 Drop


 OU BID


   9/8/21 Reported


 


 Triamterene-Hctz


 37.5-25 Mg Cp


  (Triamterene/Hydrochlorothiazid)


 1 Each Capsule  1 Cap


 PO DAILY


   9/8/21 Reported


 


 Ventolin Hfa


 Inhaler


  (Albuterol


 Sulfate) 18 Gm


 Hfa.aer.ad  2 Puff


 INH PRN Q4HRS PRN


   9/8/21 Reported


 


 Singulair Tablet


 ** (Montelukast


 Sodium) 10 Mg


 Tablet  10 Mg


 PO HS


   9/8/21 Reported


 


 Duoneb 0.5-3(2.5)


 Mg/3 Ml


  (Albuterol/Ipratropium)


 3 Ml Ampul.neb  3 Ml


 NEB QID


   9/8/21 Reported


 


 Lantus Solostar


  (Insulin


 Glargine,Hum.rec.anlog)


 100 Unit/1 Ml


 Insuln.pen  30 Unit


 SQ QHS


   9/8/21 Reported


 


 Eliquis


  (Apixaban) 5 Mg


 Tablet  5 Mg


 PO BID


   9/8/21 Reported


 


 Cozaar **


  (Losartan


 Potassium) 25 Mg


 Tablet  25 Mg


 PO DAILY


   9/8/21 Reported


 


 Cosopt Eye Drops


  (Dorzolamide


 Hcl/Timolol


 Maleat) 10 Ml


 Drops  1 Drop


 EACHEYE BID


   9/8/21 Reported


 


 Atorvastatin


 Calcium 10 Mg


 Tablet  10 Mg


 PO HS


   9/8/21 Reported


 


 Admelog (Insulin


 Lispro) 100


 Unit/1 Ml Vial  8 Unit


 SQ TIDAC


   9/8/21 Reported


 


 Admelog (Insulin


 Lispro) 100


 Unit/1 Ml Vial  0-12 Unit


 SQ TIDWMEALS


   9/8/21 Reported


 


 Acetaminophen 325


 Mg Tablet  2 Tab


 PO PRN Q6HRS PRN


  30 9/8/21 Reported











Impression


.


IMPRESSION:


1.  Acute hypoxemic respiratory failure, multifactorial.


2.  Possible bacterial pneumonia.


3.  Bilateral pulmonary infiltrates on chest x-ray, compatible with possible 


pulmonary edema, left-sided effusion.


4.  Multifactorial encephalopathy.


5.  Recent open reduction and internal fixation for hip fracture.


6.  SARS-CoV-2 was negative.


7.  Other comorbidities including type 2 diabetes, neuropathy, dementia, major 


depressive disorder, allergic rhinitis, and atrial fibrillation.


8.  Status post thoracentesis on the right removing 650 cc





CT chest reviewed


Impression:


1.  Moderate bilateral pleural effusions with adjacent atelectasis or 

consolidations.


2.  Mild right upper lobe groundglass opacities, may represent infectious or 

inflammatory process.


3.  Mildly prominent mediastinal lymph nodes, likely reactive.


4.  Increased bilateral adrenal gland nodular thickening. Recommend follow-up 

adrenal protocol CT or MRI.





Plan


.


Updated 10/12


Patient in no respiratory distress


Follow-up on analysis of pleural fluid


Suspect transudate of effusion


Discussed with  at the bedside 


Okay to plan discharge for 10/13





updated 10/11


Discussed with interventional radiologist


Proceed with thoracentesis








Updated 10/10


Schedule thoracentesis in the a.m. with interventional radiologist, need to 

discuss with family


Continue current support


Empiric antibiotics


KADE Gilman MD              Oct 14, 2021 09:11

## 2023-11-21 NOTE — NUR
Chart and labs reviewed for length of stay. No nutrition intervention indicated at this time. RD available via consult. Will continue to follow per protocol.   Pt states she is tolerating the diet well at this time     Pt trying to get out of bed "I can't breathe." Pt's oxygen level 89% on room air. Placed 
oxygen at 2 liters per nasal cannula. Reassured pt and had her breathe in her nose and out 
her mouth. Pt sats 94%. Tetracycline Counseling: Patient counseled regarding possible photosensitivity and increased risk for sunburn.  Patient instructed to avoid sunlight, if possible.  When exposed to sunlight, patients should wear protective clothing, sunglasses, and sunscreen.  The patient was instructed to call the office immediately if the following severe adverse effects occur:  hearing changes, easy bruising/bleeding, severe headache, or vision changes.  The patient verbalized understanding of the proper use and possible adverse effects of tetracycline.  All of the patient's questions and concerns were addressed. Patient understands to avoid pregnancy while on therapy due to potential birth defects.

## 2023-11-23 NOTE — RAD
Pelvis and right hip:



Reason for examination: Fell out of bed with right hip pain.



Single view the pelvis shows no acute bony abnormality in the sacrum, iliac wings or superior-inferio
r pubic rami. Proximal left femur is intact. The right proximal femur shows a femoral neck fracture w
ith minimal displacement.



2 views of the right hip again show a right femoral neck fracture with minimal displacement. Hip join
t is maintained. No abnormal periosteal reaction is seen. The bone density is normal.



IMPRESSION:



Fracture at the right femoral neck with minimal displacement.



Electronically signed by: Ximena Matos MD (9/8/2021 6:54 AM) NICOLE
Pelvis and right hip:



Reason for examination: Fell out of bed with right hip pain.



Single view the pelvis shows no acute bony abnormality in the sacrum, iliac wings or superior-inferio
r pubic rami. Proximal left femur is intact. The right proximal femur shows a femoral neck fracture w
ith minimal displacement.



2 views of the right hip again show a right femoral neck fracture with minimal displacement. Hip join
t is maintained. No abnormal periosteal reaction is seen. The bone density is normal.



IMPRESSION:



Fracture at the right femoral neck with minimal displacement.



Electronically signed by: Ximena Matos MD (9/8/2021 6:54 AM) NICOLE
Yes